# Patient Record
Sex: MALE | Race: BLACK OR AFRICAN AMERICAN | Employment: OTHER | URBAN - METROPOLITAN AREA
[De-identification: names, ages, dates, MRNs, and addresses within clinical notes are randomized per-mention and may not be internally consistent; named-entity substitution may affect disease eponyms.]

---

## 2022-01-01 ENCOUNTER — APPOINTMENT (OUTPATIENT)
Dept: GENERAL RADIOLOGY | Age: 87
DRG: 682 | End: 2022-01-01
Payer: MEDICARE

## 2022-01-01 ENCOUNTER — HOSPITAL ENCOUNTER (EMERGENCY)
Age: 87
End: 2022-10-27
Attending: EMERGENCY MEDICINE
Payer: MEDICARE

## 2022-01-01 ENCOUNTER — APPOINTMENT (OUTPATIENT)
Dept: CT IMAGING | Age: 87
DRG: 682 | End: 2022-01-01
Payer: MEDICARE

## 2022-01-01 ENCOUNTER — APPOINTMENT (OUTPATIENT)
Dept: GENERAL RADIOLOGY | Age: 87
End: 2022-01-01
Payer: MEDICARE

## 2022-01-01 ENCOUNTER — APPOINTMENT (OUTPATIENT)
Dept: VASCULAR LAB | Age: 87
DRG: 682 | End: 2022-01-01
Payer: MEDICARE

## 2022-01-01 ENCOUNTER — HOSPITAL ENCOUNTER (INPATIENT)
Age: 87
LOS: 14 days | Discharge: NURSING, REHAB OR LONG-TERM CARE | DRG: 682 | End: 2022-10-25
Attending: EMERGENCY MEDICINE | Admitting: INTERNAL MEDICINE
Payer: MEDICARE

## 2022-01-01 VITALS
OXYGEN SATURATION: 78 % | RESPIRATION RATE: 24 BRPM | BODY MASS INDEX: 32.26 KG/M2 | HEIGHT: 69 IN | SYSTOLIC BLOOD PRESSURE: 72 MMHG | DIASTOLIC BLOOD PRESSURE: 50 MMHG | WEIGHT: 217.8 LBS | HEART RATE: 88 BPM

## 2022-01-01 VITALS
WEIGHT: 202.82 LBS | HEIGHT: 69 IN | DIASTOLIC BLOOD PRESSURE: 44 MMHG | TEMPERATURE: 98.5 F | SYSTOLIC BLOOD PRESSURE: 92 MMHG | OXYGEN SATURATION: 99 % | RESPIRATION RATE: 18 BRPM | HEART RATE: 99 BPM | BODY MASS INDEX: 30.04 KG/M2

## 2022-01-01 DIAGNOSIS — I95.9 HYPOTENSION, UNSPECIFIED HYPOTENSION TYPE: ICD-10-CM

## 2022-01-01 DIAGNOSIS — I46.9 CARDIAC ARREST (HCC): Primary | ICD-10-CM

## 2022-01-01 DIAGNOSIS — K92.1 MELENA: ICD-10-CM

## 2022-01-01 DIAGNOSIS — N18.6 ESRD (END STAGE RENAL DISEASE) ON DIALYSIS (HCC): Primary | ICD-10-CM

## 2022-01-01 DIAGNOSIS — Z99.2 ESRD (END STAGE RENAL DISEASE) ON DIALYSIS (HCC): Primary | ICD-10-CM

## 2022-01-01 LAB
ABO/RH: NORMAL
ABO/RH: NORMAL
ALBUMIN SERPL-MCNC: 2.3 G/DL (ref 3.4–5)
ALBUMIN SERPL-MCNC: 2.5 G/DL (ref 3.4–5)
ALBUMIN SERPL-MCNC: 2.6 G/DL (ref 3.4–5)
ALBUMIN SERPL-MCNC: 2.7 G/DL (ref 3.4–5)
ALBUMIN SERPL-MCNC: 2.8 G/DL (ref 3.4–5)
ALBUMIN SERPL-MCNC: 3 G/DL (ref 3.4–5)
ALBUMIN SERPL-MCNC: 3.1 G/DL (ref 3.4–5)
ALBUMIN SERPL-MCNC: 3.1 G/DL (ref 3.4–5)
ALBUMIN SERPL-MCNC: 3.2 G/DL (ref 3.4–5)
ALBUMIN SERPL-MCNC: 3.3 G/DL (ref 3.4–5)
ALBUMIN SERPL-MCNC: 3.4 G/DL (ref 3.4–5)
ALBUMIN SERPL-MCNC: 3.4 G/DL (ref 3.4–5)
ALBUMIN SERPL-MCNC: 3.5 G/DL (ref 3.4–5)
ALP BLD-CCNC: 274 U/L (ref 40–129)
ALP BLD-CCNC: 77 U/L (ref 40–129)
ALT SERPL-CCNC: 170 U/L (ref 10–40)
ALT SERPL-CCNC: <5 U/L (ref 10–40)
ANION GAP SERPL CALCULATED.3IONS-SCNC: 10 MMOL/L (ref 3–16)
ANION GAP SERPL CALCULATED.3IONS-SCNC: 10 MMOL/L (ref 3–16)
ANION GAP SERPL CALCULATED.3IONS-SCNC: 11 MMOL/L (ref 3–16)
ANION GAP SERPL CALCULATED.3IONS-SCNC: 12 MMOL/L (ref 3–16)
ANION GAP SERPL CALCULATED.3IONS-SCNC: 13 MMOL/L (ref 3–16)
ANION GAP SERPL CALCULATED.3IONS-SCNC: 14 MMOL/L (ref 3–16)
ANION GAP SERPL CALCULATED.3IONS-SCNC: 14 MMOL/L (ref 3–16)
ANION GAP SERPL CALCULATED.3IONS-SCNC: 15 MMOL/L (ref 3–16)
ANION GAP SERPL CALCULATED.3IONS-SCNC: 17 MMOL/L (ref 3–16)
ANION GAP SERPL CALCULATED.3IONS-SCNC: 8 MMOL/L (ref 3–16)
ANION GAP SERPL CALCULATED.3IONS-SCNC: 8 MMOL/L (ref 3–16)
ANION GAP SERPL CALCULATED.3IONS-SCNC: 9 MMOL/L (ref 3–16)
ANION GAP SERPL CALCULATED.3IONS-SCNC: 9 MMOL/L (ref 3–16)
ANISOCYTOSIS: ABNORMAL
ANISOCYTOSIS: ABNORMAL
ANTIBODY SCREEN: NORMAL
ANTIBODY SCREEN: NORMAL
APTT: 50.5 SEC (ref 23–34.3)
AST SERPL-CCNC: 24 U/L (ref 15–37)
AST SERPL-CCNC: 485 U/L (ref 15–37)
BASE EXCESS VENOUS: -4.9 MMOL/L (ref -2–3)
BASOPHILS ABSOLUTE: 0 K/UL (ref 0–0.2)
BASOPHILS ABSOLUTE: 0.1 K/UL (ref 0–0.2)
BASOPHILS RELATIVE PERCENT: 0 %
BASOPHILS RELATIVE PERCENT: 0.1 %
BASOPHILS RELATIVE PERCENT: 0.3 %
BASOPHILS RELATIVE PERCENT: 0.4 %
BASOPHILS RELATIVE PERCENT: 0.5 %
BASOPHILS RELATIVE PERCENT: 0.7 %
BETA-HYDROXYBUTYRATE: 2.28 MMOL/L (ref 0–0.27)
BILIRUB SERPL-MCNC: 0.8 MG/DL (ref 0–1)
BILIRUB SERPL-MCNC: 5.2 MG/DL (ref 0–1)
BILIRUBIN DIRECT: 0.4 MG/DL (ref 0–0.3)
BILIRUBIN DIRECT: 4 MG/DL (ref 0–0.3)
BILIRUBIN, INDIRECT: 0.4 MG/DL (ref 0–1)
BILIRUBIN, INDIRECT: 1.2 MG/DL (ref 0–1)
BLOOD BANK DISPENSE STATUS: NORMAL
BLOOD BANK DISPENSE STATUS: NORMAL
BLOOD BANK PRODUCT CODE: NORMAL
BLOOD BANK PRODUCT CODE: NORMAL
BPU ID: NORMAL
BPU ID: NORMAL
BUN BLDV-MCNC: 13 MG/DL (ref 7–20)
BUN BLDV-MCNC: 18 MG/DL (ref 7–20)
BUN BLDV-MCNC: 20 MG/DL (ref 7–20)
BUN BLDV-MCNC: 20 MG/DL (ref 7–20)
BUN BLDV-MCNC: 23 MG/DL (ref 7–20)
BUN BLDV-MCNC: 26 MG/DL (ref 7–20)
BUN BLDV-MCNC: 26 MG/DL (ref 7–20)
BUN BLDV-MCNC: 27 MG/DL (ref 7–20)
BUN BLDV-MCNC: 28 MG/DL (ref 7–20)
BUN BLDV-MCNC: 29 MG/DL (ref 7–20)
BUN BLDV-MCNC: 30 MG/DL (ref 7–20)
BUN BLDV-MCNC: 31 MG/DL (ref 7–20)
BUN BLDV-MCNC: 32 MG/DL (ref 7–20)
BUN BLDV-MCNC: 34 MG/DL (ref 7–20)
BUN BLDV-MCNC: 42 MG/DL (ref 7–20)
BUN BLDV-MCNC: 47 MG/DL (ref 7–20)
BUN BLDV-MCNC: 54 MG/DL (ref 7–20)
BUN BLDV-MCNC: 60 MG/DL (ref 7–20)
CALCIUM SERPL-MCNC: 7.8 MG/DL (ref 8.3–10.6)
CALCIUM SERPL-MCNC: 8.2 MG/DL (ref 8.3–10.6)
CALCIUM SERPL-MCNC: 8.3 MG/DL (ref 8.3–10.6)
CALCIUM SERPL-MCNC: 8.3 MG/DL (ref 8.3–10.6)
CALCIUM SERPL-MCNC: 8.4 MG/DL (ref 8.3–10.6)
CALCIUM SERPL-MCNC: 8.5 MG/DL (ref 8.3–10.6)
CALCIUM SERPL-MCNC: 8.6 MG/DL (ref 8.3–10.6)
CALCIUM SERPL-MCNC: 8.6 MG/DL (ref 8.3–10.6)
CALCIUM SERPL-MCNC: 8.8 MG/DL (ref 8.3–10.6)
CALCIUM SERPL-MCNC: 8.8 MG/DL (ref 8.3–10.6)
CALCIUM SERPL-MCNC: 9 MG/DL (ref 8.3–10.6)
CALCIUM SERPL-MCNC: 9.1 MG/DL (ref 8.3–10.6)
CARBOXYHEMOGLOBIN: 1.3 % (ref 0–1.5)
CHLORIDE BLD-SCNC: 100 MMOL/L (ref 99–110)
CHLORIDE BLD-SCNC: 101 MMOL/L (ref 99–110)
CHLORIDE BLD-SCNC: 102 MMOL/L (ref 99–110)
CHLORIDE BLD-SCNC: 104 MMOL/L (ref 99–110)
CHLORIDE BLD-SCNC: 105 MMOL/L (ref 99–110)
CHLORIDE BLD-SCNC: 98 MMOL/L (ref 99–110)
CHLORIDE BLD-SCNC: 98 MMOL/L (ref 99–110)
CHLORIDE BLD-SCNC: 99 MMOL/L (ref 99–110)
CHLORIDE BLD-SCNC: 99 MMOL/L (ref 99–110)
CO2: 20 MMOL/L (ref 21–32)
CO2: 21 MMOL/L (ref 21–32)
CO2: 22 MMOL/L (ref 21–32)
CO2: 23 MMOL/L (ref 21–32)
CO2: 23 MMOL/L (ref 21–32)
CO2: 24 MMOL/L (ref 21–32)
CO2: 25 MMOL/L (ref 21–32)
CO2: 26 MMOL/L (ref 21–32)
CO2: 27 MMOL/L (ref 21–32)
CO2: 28 MMOL/L (ref 21–32)
CO2: 28 MMOL/L (ref 21–32)
CORTISOL TOTAL: 22.4 UG/DL
CREAT SERPL-MCNC: 1.3 MG/DL (ref 0.8–1.3)
CREAT SERPL-MCNC: 1.4 MG/DL (ref 0.8–1.3)
CREAT SERPL-MCNC: 1.5 MG/DL (ref 0.8–1.3)
CREAT SERPL-MCNC: 1.5 MG/DL (ref 0.8–1.3)
CREAT SERPL-MCNC: 1.7 MG/DL (ref 0.8–1.3)
CREAT SERPL-MCNC: 1.8 MG/DL (ref 0.8–1.3)
CREAT SERPL-MCNC: 2 MG/DL (ref 0.8–1.3)
CREAT SERPL-MCNC: 2 MG/DL (ref 0.8–1.3)
CREAT SERPL-MCNC: 2.1 MG/DL (ref 0.8–1.3)
CREAT SERPL-MCNC: 2.2 MG/DL (ref 0.8–1.3)
CREAT SERPL-MCNC: 2.2 MG/DL (ref 0.8–1.3)
CREAT SERPL-MCNC: 2.5 MG/DL (ref 0.8–1.3)
CREAT SERPL-MCNC: 2.6 MG/DL (ref 0.8–1.3)
CREAT SERPL-MCNC: 2.6 MG/DL (ref 0.8–1.3)
DESCRIPTION BLOOD BANK: NORMAL
DESCRIPTION BLOOD BANK: NORMAL
EKG ATRIAL RATE: 113 BPM
EKG ATRIAL RATE: 357 BPM
EKG DIAGNOSIS: NORMAL
EKG DIAGNOSIS: NORMAL
EKG Q-T INTERVAL: 334 MS
EKG Q-T INTERVAL: 376 MS
EKG QRS DURATION: 100 MS
EKG QRS DURATION: 106 MS
EKG QTC CALCULATION (BAZETT): 458 MS
EKG QTC CALCULATION (BAZETT): 496 MS
EKG R AXIS: -15 DEGREES
EKG R AXIS: 36 DEGREES
EKG T AXIS: 163 DEGREES
EKG T AXIS: 221 DEGREES
EKG VENTRICULAR RATE: 105 BPM
EKG VENTRICULAR RATE: 113 BPM
EOSINOPHILS ABSOLUTE: 0 K/UL (ref 0–0.6)
EOSINOPHILS ABSOLUTE: 0.1 K/UL (ref 0–0.6)
EOSINOPHILS RELATIVE PERCENT: 0 %
EOSINOPHILS RELATIVE PERCENT: 0.1 %
EOSINOPHILS RELATIVE PERCENT: 0.1 %
EOSINOPHILS RELATIVE PERCENT: 0.2 %
EOSINOPHILS RELATIVE PERCENT: 0.3 %
EOSINOPHILS RELATIVE PERCENT: 0.5 %
EOSINOPHILS RELATIVE PERCENT: 0.5 %
EOSINOPHILS RELATIVE PERCENT: 0.6 %
EOSINOPHILS RELATIVE PERCENT: 0.8 %
FERRITIN: 564.1 NG/ML (ref 30–400)
FOLATE: 4.16 NG/ML (ref 4.78–24.2)
GFR AFRICAN AMERICAN: 28
GFR AFRICAN AMERICAN: 39
GFR AFRICAN AMERICAN: 44
GFR AFRICAN AMERICAN: 47
GFR AFRICAN AMERICAN: 58
GFR AFRICAN AMERICAN: >60
GFR NON-AFRICAN AMERICAN: 24
GFR NON-AFRICAN AMERICAN: 32
GFR NON-AFRICAN AMERICAN: 36
GFR NON-AFRICAN AMERICAN: 38
GFR NON-AFRICAN AMERICAN: 48
GFR NON-AFRICAN AMERICAN: 52
GFR SERPL CREATININE-BSD FRML MDRD: 23 ML/MIN/{1.73_M2}
GFR SERPL CREATININE-BSD FRML MDRD: 24 ML/MIN/{1.73_M2}
GFR SERPL CREATININE-BSD FRML MDRD: 29 ML/MIN/{1.73_M2}
GFR SERPL CREATININE-BSD FRML MDRD: 29 ML/MIN/{1.73_M2}
GFR SERPL CREATININE-BSD FRML MDRD: 30 ML/MIN/{1.73_M2}
GFR SERPL CREATININE-BSD FRML MDRD: 32 ML/MIN/{1.73_M2}
GFR SERPL CREATININE-BSD FRML MDRD: 39 ML/MIN/{1.73_M2}
GFR SERPL CREATININE-BSD FRML MDRD: 39 ML/MIN/{1.73_M2}
GFR SERPL CREATININE-BSD FRML MDRD: 45 ML/MIN/{1.73_M2}
GFR SERPL CREATININE-BSD FRML MDRD: 45 ML/MIN/{1.73_M2}
GLUCOSE BLD-MCNC: 105 MG/DL (ref 70–99)
GLUCOSE BLD-MCNC: 109 MG/DL (ref 70–99)
GLUCOSE BLD-MCNC: 109 MG/DL (ref 70–99)
GLUCOSE BLD-MCNC: 111 MG/DL (ref 70–99)
GLUCOSE BLD-MCNC: 64 MG/DL (ref 70–99)
GLUCOSE BLD-MCNC: 67 MG/DL (ref 70–99)
GLUCOSE BLD-MCNC: 73 MG/DL (ref 70–99)
GLUCOSE BLD-MCNC: 77 MG/DL (ref 70–99)
GLUCOSE BLD-MCNC: 78 MG/DL (ref 70–99)
GLUCOSE BLD-MCNC: 78 MG/DL (ref 70–99)
GLUCOSE BLD-MCNC: 84 MG/DL (ref 70–99)
GLUCOSE BLD-MCNC: 87 MG/DL (ref 70–99)
GLUCOSE BLD-MCNC: 89 MG/DL (ref 70–99)
GLUCOSE BLD-MCNC: 90 MG/DL (ref 70–99)
GLUCOSE BLD-MCNC: 91 MG/DL (ref 70–99)
GLUCOSE BLD-MCNC: 92 MG/DL (ref 70–99)
GLUCOSE BLD-MCNC: 94 MG/DL (ref 70–99)
GLUCOSE BLD-MCNC: 98 MG/DL (ref 70–99)
HAPTOGLOBIN: 162 MG/DL (ref 30–200)
HCO3 VENOUS: 23.1 MMOL/L (ref 24–28)
HCT VFR BLD CALC: 16.5 % (ref 40.5–52.5)
HCT VFR BLD CALC: 17 % (ref 40.5–52.5)
HCT VFR BLD CALC: 17.2 % (ref 40.5–52.5)
HCT VFR BLD CALC: 17.4 % (ref 40.5–52.5)
HCT VFR BLD CALC: 17.5 % (ref 40.5–52.5)
HCT VFR BLD CALC: 17.6 % (ref 40.5–52.5)
HCT VFR BLD CALC: 18.1 % (ref 40.5–52.5)
HCT VFR BLD CALC: 20.1 % (ref 40.5–52.5)
HCT VFR BLD CALC: 21.1 % (ref 40.5–52.5)
HCT VFR BLD CALC: 21.4 % (ref 40.5–52.5)
HCT VFR BLD CALC: 21.5 % (ref 40.5–52.5)
HCT VFR BLD CALC: 21.6 % (ref 40.5–52.5)
HCT VFR BLD CALC: 21.7 % (ref 40.5–52.5)
HCT VFR BLD CALC: 21.7 % (ref 40.5–52.5)
HCT VFR BLD CALC: 22.3 % (ref 40.5–52.5)
HCT VFR BLD CALC: 22.7 % (ref 40.5–52.5)
HCT VFR BLD CALC: 22.9 % (ref 40.5–52.5)
HCT VFR BLD CALC: 23 % (ref 40.5–52.5)
HCT VFR BLD CALC: 23.1 % (ref 40.5–52.5)
HCT VFR BLD CALC: 23.2 % (ref 40.5–52.5)
HCT VFR BLD CALC: 23.5 % (ref 40.5–52.5)
HCT VFR BLD CALC: 24.2 % (ref 40.5–52.5)
HCT VFR BLD CALC: 24.4 % (ref 40.5–52.5)
HCT VFR BLD CALC: 25.6 % (ref 40.5–52.5)
HCT VFR BLD CALC: 28.4 % (ref 40.5–52.5)
HCT VFR BLD CALC: 33.3 % (ref 40.5–52.5)
HEMOGLOBIN, VEN, REDUCED: 58.3 %
HEMOGLOBIN: 10.2 G/DL (ref 13.5–17.5)
HEMOGLOBIN: 5.4 G/DL (ref 13.5–17.5)
HEMOGLOBIN: 5.6 G/DL (ref 13.5–17.5)
HEMOGLOBIN: 5.8 G/DL (ref 13.5–17.5)
HEMOGLOBIN: 5.9 G/DL (ref 13.5–17.5)
HEMOGLOBIN: 6.5 G/DL (ref 13.5–17.5)
HEMOGLOBIN: 6.9 G/DL (ref 13.5–17.5)
HEMOGLOBIN: 7 G/DL (ref 13.5–17.5)
HEMOGLOBIN: 7.1 G/DL (ref 13.5–17.5)
HEMOGLOBIN: 7.2 G/DL (ref 13.5–17.5)
HEMOGLOBIN: 7.3 G/DL (ref 13.5–17.5)
HEMOGLOBIN: 7.4 G/DL (ref 13.5–17.5)
HEMOGLOBIN: 7.4 G/DL (ref 13.5–17.5)
HEMOGLOBIN: 7.5 G/DL (ref 13.5–17.5)
HEMOGLOBIN: 7.6 G/DL (ref 13.5–17.5)
HEMOGLOBIN: 7.6 G/DL (ref 13.5–17.5)
HEMOGLOBIN: 7.8 G/DL (ref 13.5–17.5)
HEMOGLOBIN: 7.8 G/DL (ref 13.5–17.5)
HEMOGLOBIN: 9.2 G/DL (ref 13.5–17.5)
HYPOCHROMIA: ABNORMAL
IMMATURE RETIC FRACT: 0.57 (ref 0.21–0.37)
INR BLD: 2.5 (ref 0.87–1.14)
IRON SATURATION: 34 % (ref 20–50)
IRON: 25 UG/DL (ref 59–158)
LACTATE DEHYDROGENASE: 278 U/L (ref 100–190)
LACTIC ACID: 1.1 MMOL/L (ref 0.4–2)
LACTIC ACID: 6.9 MMOL/L (ref 0.4–2)
LYMPHOCYTES ABSOLUTE: 1 K/UL (ref 1–5.1)
LYMPHOCYTES ABSOLUTE: 1.1 K/UL (ref 1–5.1)
LYMPHOCYTES ABSOLUTE: 1.2 K/UL (ref 1–5.1)
LYMPHOCYTES ABSOLUTE: 1.3 K/UL (ref 1–5.1)
LYMPHOCYTES ABSOLUTE: 1.4 K/UL (ref 1–5.1)
LYMPHOCYTES ABSOLUTE: 1.5 K/UL (ref 1–5.1)
LYMPHOCYTES ABSOLUTE: 3.4 K/UL (ref 1–5.1)
LYMPHOCYTES RELATIVE PERCENT: 10.1 %
LYMPHOCYTES RELATIVE PERCENT: 11.6 %
LYMPHOCYTES RELATIVE PERCENT: 12 %
LYMPHOCYTES RELATIVE PERCENT: 12 %
LYMPHOCYTES RELATIVE PERCENT: 13.9 %
LYMPHOCYTES RELATIVE PERCENT: 14 %
LYMPHOCYTES RELATIVE PERCENT: 14.5 %
LYMPHOCYTES RELATIVE PERCENT: 14.8 %
LYMPHOCYTES RELATIVE PERCENT: 15 %
LYMPHOCYTES RELATIVE PERCENT: 15 %
LYMPHOCYTES RELATIVE PERCENT: 4.2 %
LYMPHOCYTES RELATIVE PERCENT: 5.1 %
LYMPHOCYTES RELATIVE PERCENT: 5.7 %
LYMPHOCYTES RELATIVE PERCENT: 7.1 %
MAGNESIUM: 1.7 MG/DL (ref 1.8–2.4)
MAGNESIUM: 1.7 MG/DL (ref 1.8–2.4)
MAGNESIUM: 2 MG/DL (ref 1.8–2.4)
MCH RBC QN AUTO: 27.1 PG (ref 26–34)
MCH RBC QN AUTO: 27.1 PG (ref 26–34)
MCH RBC QN AUTO: 27.5 PG (ref 26–34)
MCH RBC QN AUTO: 27.8 PG (ref 26–34)
MCH RBC QN AUTO: 27.8 PG (ref 26–34)
MCH RBC QN AUTO: 27.9 PG (ref 26–34)
MCH RBC QN AUTO: 27.9 PG (ref 26–34)
MCH RBC QN AUTO: 28.1 PG (ref 26–34)
MCH RBC QN AUTO: 28.3 PG (ref 26–34)
MCH RBC QN AUTO: 28.4 PG (ref 26–34)
MCH RBC QN AUTO: 28.5 PG (ref 26–34)
MCH RBC QN AUTO: 28.6 PG (ref 26–34)
MCH RBC QN AUTO: 28.8 PG (ref 26–34)
MCHC RBC AUTO-ENTMCNC: 30.4 G/DL (ref 31–36)
MCHC RBC AUTO-ENTMCNC: 30.4 G/DL (ref 31–36)
MCHC RBC AUTO-ENTMCNC: 30.8 G/DL (ref 31–36)
MCHC RBC AUTO-ENTMCNC: 30.9 G/DL (ref 31–36)
MCHC RBC AUTO-ENTMCNC: 31.3 G/DL (ref 31–36)
MCHC RBC AUTO-ENTMCNC: 31.9 G/DL (ref 31–36)
MCHC RBC AUTO-ENTMCNC: 31.9 G/DL (ref 31–36)
MCHC RBC AUTO-ENTMCNC: 32.3 G/DL (ref 31–36)
MCHC RBC AUTO-ENTMCNC: 32.3 G/DL (ref 31–36)
MCHC RBC AUTO-ENTMCNC: 32.4 G/DL (ref 31–36)
MCHC RBC AUTO-ENTMCNC: 32.6 G/DL (ref 31–36)
MCHC RBC AUTO-ENTMCNC: 33.1 G/DL (ref 31–36)
MCHC RBC AUTO-ENTMCNC: 33.3 G/DL (ref 31–36)
MCHC RBC AUTO-ENTMCNC: 33.4 G/DL (ref 31–36)
MCHC RBC AUTO-ENTMCNC: 33.8 G/DL (ref 31–36)
MCV RBC AUTO: 84.2 FL (ref 80–100)
MCV RBC AUTO: 84.4 FL (ref 80–100)
MCV RBC AUTO: 85 FL (ref 80–100)
MCV RBC AUTO: 85.1 FL (ref 80–100)
MCV RBC AUTO: 85.6 FL (ref 80–100)
MCV RBC AUTO: 85.8 FL (ref 80–100)
MCV RBC AUTO: 86.9 FL (ref 80–100)
MCV RBC AUTO: 88.1 FL (ref 80–100)
MCV RBC AUTO: 88.1 FL (ref 80–100)
MCV RBC AUTO: 88.2 FL (ref 80–100)
MCV RBC AUTO: 89 FL (ref 80–100)
MCV RBC AUTO: 89 FL (ref 80–100)
MCV RBC AUTO: 89.8 FL (ref 80–100)
MCV RBC AUTO: 90 FL (ref 80–100)
MCV RBC AUTO: 91.7 FL (ref 80–100)
METHEMOGLOBIN VENOUS: 0.7 % (ref 0–1.5)
MONOCYTES ABSOLUTE: 0.7 K/UL (ref 0–1.3)
MONOCYTES ABSOLUTE: 0.8 K/UL (ref 0–1.3)
MONOCYTES ABSOLUTE: 1.1 K/UL (ref 0–1.3)
MONOCYTES ABSOLUTE: 1.2 K/UL (ref 0–1.3)
MONOCYTES ABSOLUTE: 1.2 K/UL (ref 0–1.3)
MONOCYTES ABSOLUTE: 1.3 K/UL (ref 0–1.3)
MONOCYTES ABSOLUTE: 1.3 K/UL (ref 0–1.3)
MONOCYTES ABSOLUTE: 1.4 K/UL (ref 0–1.3)
MONOCYTES ABSOLUTE: 1.9 K/UL (ref 0–1.3)
MONOCYTES RELATIVE PERCENT: 10.1 %
MONOCYTES RELATIVE PERCENT: 10.8 %
MONOCYTES RELATIVE PERCENT: 13 %
MONOCYTES RELATIVE PERCENT: 4.7 %
MONOCYTES RELATIVE PERCENT: 5.5 %
MONOCYTES RELATIVE PERCENT: 6 %
MONOCYTES RELATIVE PERCENT: 6.6 %
MONOCYTES RELATIVE PERCENT: 7.3 %
MONOCYTES RELATIVE PERCENT: 7.4 %
MONOCYTES RELATIVE PERCENT: 8.3 %
MONOCYTES RELATIVE PERCENT: 8.4 %
MONOCYTES RELATIVE PERCENT: 8.4 %
MONOCYTES RELATIVE PERCENT: 8.8 %
MONOCYTES RELATIVE PERCENT: 8.9 %
NEUTROPHILS ABSOLUTE: 17.3 K/UL (ref 1.7–7.7)
NEUTROPHILS ABSOLUTE: 18.1 K/UL (ref 1.7–7.7)
NEUTROPHILS ABSOLUTE: 18.2 K/UL (ref 1.7–7.7)
NEUTROPHILS ABSOLUTE: 20.7 K/UL (ref 1.7–7.7)
NEUTROPHILS ABSOLUTE: 21.1 K/UL (ref 1.7–7.7)
NEUTROPHILS ABSOLUTE: 6.1 K/UL (ref 1.7–7.7)
NEUTROPHILS ABSOLUTE: 6.8 K/UL (ref 1.7–7.7)
NEUTROPHILS ABSOLUTE: 6.9 K/UL (ref 1.7–7.7)
NEUTROPHILS ABSOLUTE: 7.1 K/UL (ref 1.7–7.7)
NEUTROPHILS ABSOLUTE: 7.2 K/UL (ref 1.7–7.7)
NEUTROPHILS ABSOLUTE: 7.3 K/UL (ref 1.7–7.7)
NEUTROPHILS ABSOLUTE: 7.4 K/UL (ref 1.7–7.7)
NEUTROPHILS ABSOLUTE: 8.5 K/UL (ref 1.7–7.7)
NEUTROPHILS ABSOLUTE: 8.5 K/UL (ref 1.7–7.7)
NEUTROPHILS RELATIVE PERCENT: 74.5 %
NEUTROPHILS RELATIVE PERCENT: 75 %
NEUTROPHILS RELATIVE PERCENT: 75.4 %
NEUTROPHILS RELATIVE PERCENT: 76.1 %
NEUTROPHILS RELATIVE PERCENT: 76.2 %
NEUTROPHILS RELATIVE PERCENT: 76.4 %
NEUTROPHILS RELATIVE PERCENT: 76.6 %
NEUTROPHILS RELATIVE PERCENT: 77.3 %
NEUTROPHILS RELATIVE PERCENT: 79.7 %
NEUTROPHILS RELATIVE PERCENT: 81.8 %
NEUTROPHILS RELATIVE PERCENT: 86.5 %
NEUTROPHILS RELATIVE PERCENT: 87.2 %
NEUTROPHILS RELATIVE PERCENT: 89.3 %
NEUTROPHILS RELATIVE PERCENT: 90.1 %
NUCLEATED RED BLOOD CELLS: 3 /100 WBC
O2 SAT, VEN: 41 %
OCCULT BLOOD DIAGNOSTIC: ABNORMAL
PCO2, VEN: 58.5 MMHG (ref 41–51)
PDW BLD-RTO: 20 % (ref 12.4–15.4)
PDW BLD-RTO: 20.1 % (ref 12.4–15.4)
PDW BLD-RTO: 20.3 % (ref 12.4–15.4)
PDW BLD-RTO: 20.4 % (ref 12.4–15.4)
PDW BLD-RTO: 21 % (ref 12.4–15.4)
PDW BLD-RTO: 21.1 % (ref 12.4–15.4)
PDW BLD-RTO: 21.3 % (ref 12.4–15.4)
PDW BLD-RTO: 21.4 % (ref 12.4–15.4)
PDW BLD-RTO: 21.9 % (ref 12.4–15.4)
PDW BLD-RTO: 21.9 % (ref 12.4–15.4)
PDW BLD-RTO: 22.1 % (ref 12.4–15.4)
PDW BLD-RTO: 22.5 % (ref 12.4–15.4)
PDW BLD-RTO: 22.7 % (ref 12.4–15.4)
PDW BLD-RTO: 22.7 % (ref 12.4–15.4)
PDW BLD-RTO: 23.7 % (ref 12.4–15.4)
PH VENOUS: 7.2 (ref 7.35–7.45)
PHOSPHORUS: 1.1 MG/DL (ref 2.5–4.9)
PHOSPHORUS: 1.1 MG/DL (ref 2.5–4.9)
PHOSPHORUS: 1.4 MG/DL (ref 2.5–4.9)
PHOSPHORUS: 1.5 MG/DL (ref 2.5–4.9)
PHOSPHORUS: 1.7 MG/DL (ref 2.5–4.9)
PHOSPHORUS: 1.7 MG/DL (ref 2.5–4.9)
PHOSPHORUS: 1.8 MG/DL (ref 2.5–4.9)
PHOSPHORUS: 2 MG/DL (ref 2.5–4.9)
PHOSPHORUS: 2.4 MG/DL (ref 2.5–4.9)
PHOSPHORUS: 2.5 MG/DL (ref 2.5–4.9)
PHOSPHORUS: 2.5 MG/DL (ref 2.5–4.9)
PHOSPHORUS: 2.6 MG/DL (ref 2.5–4.9)
PHOSPHORUS: 2.8 MG/DL (ref 2.5–4.9)
PHOSPHORUS: 2.8 MG/DL (ref 2.5–4.9)
PHOSPHORUS: 2.9 MG/DL (ref 2.5–4.9)
PHOSPHORUS: 2.9 MG/DL (ref 2.5–4.9)
PLATELET # BLD: 147 K/UL (ref 135–450)
PLATELET # BLD: 155 K/UL (ref 135–450)
PLATELET # BLD: 157 K/UL (ref 135–450)
PLATELET # BLD: 173 K/UL (ref 135–450)
PLATELET # BLD: 173 K/UL (ref 135–450)
PLATELET # BLD: 176 K/UL (ref 135–450)
PLATELET # BLD: 194 K/UL (ref 135–450)
PLATELET # BLD: 195 K/UL (ref 135–450)
PLATELET # BLD: 196 K/UL (ref 135–450)
PLATELET # BLD: 198 K/UL (ref 135–450)
PLATELET # BLD: 198 K/UL (ref 135–450)
PLATELET # BLD: 208 K/UL (ref 135–450)
PLATELET # BLD: 224 K/UL (ref 135–450)
PLATELET # BLD: 238 K/UL (ref 135–450)
PLATELET # BLD: 57 K/UL (ref 135–450)
PLATELET # BLD: 92 K/UL (ref 135–450)
PLATELET SLIDE REVIEW: ABNORMAL
PLATELET SLIDE REVIEW: ADEQUATE
PMV BLD AUTO: 7.8 FL (ref 5–10.5)
PMV BLD AUTO: 7.9 FL (ref 5–10.5)
PMV BLD AUTO: 8 FL (ref 5–10.5)
PMV BLD AUTO: 8 FL (ref 5–10.5)
PMV BLD AUTO: 8.1 FL (ref 5–10.5)
PMV BLD AUTO: 8.2 FL (ref 5–10.5)
PMV BLD AUTO: 8.2 FL (ref 5–10.5)
PMV BLD AUTO: 8.3 FL (ref 5–10.5)
PMV BLD AUTO: 8.3 FL (ref 5–10.5)
PMV BLD AUTO: 8.4 FL (ref 5–10.5)
PMV BLD AUTO: 8.6 FL (ref 5–10.5)
PO2, VEN: <30 MMHG (ref 25–40)
POIKILOCYTES: ABNORMAL
POLYCHROMASIA: ABNORMAL
POTASSIUM REFLEX MAGNESIUM: 3.7 MMOL/L (ref 3.5–5.1)
POTASSIUM REFLEX MAGNESIUM: 4.3 MMOL/L (ref 3.5–5.1)
POTASSIUM REFLEX MAGNESIUM: 4.8 MMOL/L (ref 3.5–5.1)
POTASSIUM SERPL-SCNC: 3.3 MMOL/L (ref 3.5–5.1)
POTASSIUM SERPL-SCNC: 3.5 MMOL/L (ref 3.5–5.1)
POTASSIUM SERPL-SCNC: 3.7 MMOL/L (ref 3.5–5.1)
POTASSIUM SERPL-SCNC: 3.8 MMOL/L (ref 3.5–5.1)
POTASSIUM SERPL-SCNC: 3.9 MMOL/L (ref 3.5–5.1)
POTASSIUM SERPL-SCNC: 4 MMOL/L (ref 3.5–5.1)
POTASSIUM SERPL-SCNC: 4 MMOL/L (ref 3.5–5.1)
POTASSIUM SERPL-SCNC: 4.1 MMOL/L (ref 3.5–5.1)
POTASSIUM SERPL-SCNC: 4.1 MMOL/L (ref 3.5–5.1)
POTASSIUM SERPL-SCNC: 4.8 MMOL/L (ref 3.5–5.1)
POTASSIUM SERPL-SCNC: 5.3 MMOL/L (ref 3.5–5.1)
PROTHROMBIN TIME: 27.1 SEC (ref 11.7–14.5)
RBC # BLD: 1.9 M/UL (ref 4.2–5.9)
RBC # BLD: 2.01 M/UL (ref 4.2–5.9)
RBC # BLD: 2.36 M/UL (ref 4.2–5.9)
RBC # BLD: 2.52 M/UL (ref 4.2–5.9)
RBC # BLD: 2.53 M/UL (ref 4.2–5.9)
RBC # BLD: 2.55 M/UL (ref 4.2–5.9)
RBC # BLD: 2.58 M/UL (ref 4.2–5.9)
RBC # BLD: 2.59 M/UL (ref 4.2–5.9)
RBC # BLD: 2.6 M/UL (ref 4.2–5.9)
RBC # BLD: 2.7 M/UL (ref 4.2–5.9)
RBC # BLD: 2.71 M/UL (ref 4.2–5.9)
RBC # BLD: 2.71 M/UL (ref 4.2–5.9)
RBC # BLD: 2.79 M/UL (ref 4.2–5.9)
RBC # BLD: 3.3 M/UL (ref 4.2–5.9)
RBC # BLD: 3.78 M/UL (ref 4.2–5.9)
REASON FOR REJECTION: NORMAL
REJECTED TEST: NORMAL
RETICULOCYTE ABSOLUTE COUNT: 0.04 M/UL
RETICULOCYTE COUNT PCT: 1.72 % (ref 0.5–2.18)
SLIDE REVIEW: ABNORMAL
SODIUM BLD-SCNC: 133 MMOL/L (ref 136–145)
SODIUM BLD-SCNC: 134 MMOL/L (ref 136–145)
SODIUM BLD-SCNC: 136 MMOL/L (ref 136–145)
SODIUM BLD-SCNC: 137 MMOL/L (ref 136–145)
SODIUM BLD-SCNC: 138 MMOL/L (ref 136–145)
SODIUM BLD-SCNC: 139 MMOL/L (ref 136–145)
SODIUM BLD-SCNC: 140 MMOL/L (ref 136–145)
SODIUM BLD-SCNC: 140 MMOL/L (ref 136–145)
TARGET CELLS: ABNORMAL
TCO2 CALC VENOUS: 25 MMOL/L
TOTAL IRON BINDING CAPACITY: 74 UG/DL (ref 260–445)
TOTAL PROTEIN: 5.5 G/DL (ref 6.4–8.2)
TOTAL PROTEIN: 5.7 G/DL (ref 6.4–8.2)
TROPONIN: 0.52 NG/ML
VITAMIN B-12: 670 PG/ML (ref 211–911)
WBC # BLD: 10.4 K/UL (ref 4–11)
WBC # BLD: 10.9 K/UL (ref 4–11)
WBC # BLD: 20.9 K/UL (ref 4–11)
WBC # BLD: 21 K/UL (ref 4–11)
WBC # BLD: 22.7 K/UL (ref 4–11)
WBC # BLD: 23.2 K/UL (ref 4–11)
WBC # BLD: 23.4 K/UL (ref 4–11)
WBC # BLD: 8.2 K/UL (ref 4–11)
WBC # BLD: 8.8 K/UL (ref 4–11)
WBC # BLD: 9.1 K/UL (ref 4–11)
WBC # BLD: 9.1 K/UL (ref 4–11)
WBC # BLD: 9.2 K/UL (ref 4–11)
WBC # BLD: 9.3 K/UL (ref 4–11)
WBC # BLD: 9.5 K/UL (ref 4–11)
WBC # BLD: 9.7 K/UL (ref 4–11)

## 2022-01-01 PROCEDURE — 2580000003 HC RX 258: Performed by: STUDENT IN AN ORGANIZED HEALTH CARE EDUCATION/TRAINING PROGRAM

## 2022-01-01 PROCEDURE — 82607 VITAMIN B-12: CPT

## 2022-01-01 PROCEDURE — 2580000003 HC RX 258: Performed by: INTERNAL MEDICINE

## 2022-01-01 PROCEDURE — 6370000000 HC RX 637 (ALT 250 FOR IP): Performed by: INTERNAL MEDICINE

## 2022-01-01 PROCEDURE — 6370000000 HC RX 637 (ALT 250 FOR IP): Performed by: NURSE PRACTITIONER

## 2022-01-01 PROCEDURE — 90935 HEMODIALYSIS ONE EVALUATION: CPT

## 2022-01-01 PROCEDURE — P9047 ALBUMIN (HUMAN), 25%, 50ML: HCPCS | Performed by: STUDENT IN AN ORGANIZED HEALTH CARE EDUCATION/TRAINING PROGRAM

## 2022-01-01 PROCEDURE — 85025 COMPLETE CBC W/AUTO DIFF WBC: CPT

## 2022-01-01 PROCEDURE — 2500000003 HC RX 250 WO HCPCS: Performed by: STUDENT IN AN ORGANIZED HEALTH CARE EDUCATION/TRAINING PROGRAM

## 2022-01-01 PROCEDURE — 99285 EMERGENCY DEPT VISIT HI MDM: CPT

## 2022-01-01 PROCEDURE — 6360000002 HC RX W HCPCS: Performed by: INTERNAL MEDICINE

## 2022-01-01 PROCEDURE — 93971 EXTREMITY STUDY: CPT

## 2022-01-01 PROCEDURE — 86900 BLOOD TYPING SEROLOGIC ABO: CPT

## 2022-01-01 PROCEDURE — 80069 RENAL FUNCTION PANEL: CPT

## 2022-01-01 PROCEDURE — 6370000000 HC RX 637 (ALT 250 FOR IP): Performed by: STUDENT IN AN ORGANIZED HEALTH CARE EDUCATION/TRAINING PROGRAM

## 2022-01-01 PROCEDURE — 83735 ASSAY OF MAGNESIUM: CPT

## 2022-01-01 PROCEDURE — 3609012400 HC EGD TRANSORAL BIOPSY SINGLE/MULTIPLE: Performed by: INTERNAL MEDICINE

## 2022-01-01 PROCEDURE — 96366 THER/PROPH/DIAG IV INF ADDON: CPT

## 2022-01-01 PROCEDURE — 6360000002 HC RX W HCPCS: Performed by: STUDENT IN AN ORGANIZED HEALTH CARE EDUCATION/TRAINING PROGRAM

## 2022-01-01 PROCEDURE — 97535 SELF CARE MNGMENT TRAINING: CPT

## 2022-01-01 PROCEDURE — 85018 HEMOGLOBIN: CPT

## 2022-01-01 PROCEDURE — 1200000000 HC SEMI PRIVATE

## 2022-01-01 PROCEDURE — 71045 X-RAY EXAM CHEST 1 VIEW: CPT

## 2022-01-01 PROCEDURE — 80076 HEPATIC FUNCTION PANEL: CPT

## 2022-01-01 PROCEDURE — 85045 AUTOMATED RETICULOCYTE COUNT: CPT

## 2022-01-01 PROCEDURE — 83605 ASSAY OF LACTIC ACID: CPT

## 2022-01-01 PROCEDURE — 97530 THERAPEUTIC ACTIVITIES: CPT

## 2022-01-01 PROCEDURE — 2500000003 HC RX 250 WO HCPCS: Performed by: EMERGENCY MEDICINE

## 2022-01-01 PROCEDURE — 83615 LACTATE (LD) (LDH) ENZYME: CPT

## 2022-01-01 PROCEDURE — 2060000000 HC ICU INTERMEDIATE R&B

## 2022-01-01 PROCEDURE — 85027 COMPLETE CBC AUTOMATED: CPT

## 2022-01-01 PROCEDURE — 36415 COLL VENOUS BLD VENIPUNCTURE: CPT

## 2022-01-01 PROCEDURE — 74176 CT ABD & PELVIS W/O CONTRAST: CPT

## 2022-01-01 PROCEDURE — 97167 OT EVAL HIGH COMPLEX 60 MIN: CPT

## 2022-01-01 PROCEDURE — P9047 ALBUMIN (HUMAN), 25%, 50ML: HCPCS | Performed by: INTERNAL MEDICINE

## 2022-01-01 PROCEDURE — 97110 THERAPEUTIC EXERCISES: CPT

## 2022-01-01 PROCEDURE — 7100000011 HC PHASE II RECOVERY - ADDTL 15 MIN: Performed by: INTERNAL MEDICINE

## 2022-01-01 PROCEDURE — C9113 INJ PANTOPRAZOLE SODIUM, VIA: HCPCS | Performed by: INTERNAL MEDICINE

## 2022-01-01 PROCEDURE — 85014 HEMATOCRIT: CPT

## 2022-01-01 PROCEDURE — 84100 ASSAY OF PHOSPHORUS: CPT

## 2022-01-01 PROCEDURE — 0DB68ZX EXCISION OF STOMACH, VIA NATURAL OR ARTIFICIAL OPENING ENDOSCOPIC, DIAGNOSTIC: ICD-10-PCS | Performed by: INTERNAL MEDICINE

## 2022-01-01 PROCEDURE — 83550 IRON BINDING TEST: CPT

## 2022-01-01 PROCEDURE — 6360000002 HC RX W HCPCS: Performed by: EMERGENCY MEDICINE

## 2022-01-01 PROCEDURE — 82010 KETONE BODYS QUAN: CPT

## 2022-01-01 PROCEDURE — 88305 TISSUE EXAM BY PATHOLOGIST: CPT

## 2022-01-01 PROCEDURE — 36430 TRANSFUSION BLD/BLD COMPNT: CPT

## 2022-01-01 PROCEDURE — 96365 THER/PROPH/DIAG IV INF INIT: CPT

## 2022-01-01 PROCEDURE — P9016 RBC LEUKOCYTES REDUCED: HCPCS

## 2022-01-01 PROCEDURE — 83540 ASSAY OF IRON: CPT

## 2022-01-01 PROCEDURE — 86901 BLOOD TYPING SEROLOGIC RH(D): CPT

## 2022-01-01 PROCEDURE — 85610 PROTHROMBIN TIME: CPT

## 2022-01-01 PROCEDURE — 5A1D70Z PERFORMANCE OF URINARY FILTRATION, INTERMITTENT, LESS THAN 6 HOURS PER DAY: ICD-10-PCS | Performed by: INTERNAL MEDICINE

## 2022-01-01 PROCEDURE — 3700000001 HC ADD 15 MINUTES (ANESTHESIA): Performed by: INTERNAL MEDICINE

## 2022-01-01 PROCEDURE — 85049 AUTOMATED PLATELET COUNT: CPT

## 2022-01-01 PROCEDURE — 80048 BASIC METABOLIC PNL TOTAL CA: CPT

## 2022-01-01 PROCEDURE — 82533 TOTAL CORTISOL: CPT

## 2022-01-01 PROCEDURE — 93005 ELECTROCARDIOGRAM TRACING: CPT | Performed by: STUDENT IN AN ORGANIZED HEALTH CARE EDUCATION/TRAINING PROGRAM

## 2022-01-01 PROCEDURE — 7100000010 HC PHASE II RECOVERY - FIRST 15 MIN: Performed by: INTERNAL MEDICINE

## 2022-01-01 PROCEDURE — 97163 PT EVAL HIGH COMPLEX 45 MIN: CPT

## 2022-01-01 PROCEDURE — 83010 ASSAY OF HAPTOGLOBIN QUANT: CPT

## 2022-01-01 PROCEDURE — 92950 HEART/LUNG RESUSCITATION CPR: CPT

## 2022-01-01 PROCEDURE — 86850 RBC ANTIBODY SCREEN: CPT

## 2022-01-01 PROCEDURE — 2500000003 HC RX 250 WO HCPCS: Performed by: INTERNAL MEDICINE

## 2022-01-01 PROCEDURE — 3700000000 HC ANESTHESIA ATTENDED CARE: Performed by: INTERNAL MEDICINE

## 2022-01-01 PROCEDURE — 2709999900 HC NON-CHARGEABLE SUPPLY: Performed by: INTERNAL MEDICINE

## 2022-01-01 PROCEDURE — 82746 ASSAY OF FOLIC ACID SERUM: CPT

## 2022-01-01 PROCEDURE — 96375 TX/PRO/DX INJ NEW DRUG ADDON: CPT

## 2022-01-01 PROCEDURE — 84484 ASSAY OF TROPONIN QUANT: CPT

## 2022-01-01 PROCEDURE — 85730 THROMBOPLASTIN TIME PARTIAL: CPT

## 2022-01-01 PROCEDURE — 31500 INSERT EMERGENCY AIRWAY: CPT

## 2022-01-01 PROCEDURE — 82803 BLOOD GASES ANY COMBINATION: CPT

## 2022-01-01 PROCEDURE — 82728 ASSAY OF FERRITIN: CPT

## 2022-01-01 PROCEDURE — 86923 COMPATIBILITY TEST ELECTRIC: CPT

## 2022-01-01 PROCEDURE — 82270 OCCULT BLOOD FECES: CPT

## 2022-01-01 PROCEDURE — 99291 CRITICAL CARE FIRST HOUR: CPT

## 2022-01-01 RX ORDER — TRAZODONE HYDROCHLORIDE 50 MG/1
50 TABLET ORAL NIGHTLY
Status: DISCONTINUED | OUTPATIENT
Start: 2022-01-01 | End: 2022-01-01 | Stop reason: HOSPADM

## 2022-01-01 RX ORDER — MIDODRINE HYDROCHLORIDE 5 MG/1
10 TABLET ORAL
Status: DISCONTINUED | OUTPATIENT
Start: 2022-01-01 | End: 2022-01-01

## 2022-01-01 RX ORDER — HEPARIN SODIUM (PORCINE) LOCK FLUSH IV SOLN 100 UNIT/ML 100 UNIT/ML
100 SOLUTION INTRAVENOUS PRN
Status: DISCONTINUED | OUTPATIENT
Start: 2022-01-01 | End: 2022-01-01 | Stop reason: HOSPADM

## 2022-01-01 RX ORDER — CALCIUM CHLORIDE 100 MG/ML
INJECTION INTRAVENOUS; INTRAVENTRICULAR DAILY PRN
Status: COMPLETED | OUTPATIENT
Start: 2022-01-01 | End: 2022-01-01

## 2022-01-01 RX ORDER — SODIUM CHLORIDE 9 MG/ML
INJECTION, SOLUTION INTRAVENOUS CONTINUOUS
Status: ACTIVE | OUTPATIENT
Start: 2022-01-01 | End: 2022-01-01

## 2022-01-01 RX ORDER — HEPARIN SODIUM 5000 [USP'U]/ML
5000 INJECTION, SOLUTION INTRAVENOUS; SUBCUTANEOUS EVERY 8 HOURS SCHEDULED
Status: CANCELLED | OUTPATIENT
Start: 2022-01-01

## 2022-01-01 RX ORDER — SODIUM CHLORIDE, SODIUM LACTATE, POTASSIUM CHLORIDE, AND CALCIUM CHLORIDE .6; .31; .03; .02 G/100ML; G/100ML; G/100ML; G/100ML
500 INJECTION, SOLUTION INTRAVENOUS ONCE
Status: COMPLETED | OUTPATIENT
Start: 2022-01-01 | End: 2022-01-01

## 2022-01-01 RX ORDER — HYDROCODONE BITARTRATE AND ACETAMINOPHEN 5; 325 MG/1; MG/1
1 TABLET ORAL ONCE
Status: COMPLETED | OUTPATIENT
Start: 2022-01-01 | End: 2022-01-01

## 2022-01-01 RX ORDER — DIPHENHYDRAMINE HCL 25 MG
50 TABLET ORAL NIGHTLY PRN
Status: DISCONTINUED | OUTPATIENT
Start: 2022-01-01 | End: 2022-01-01 | Stop reason: HOSPADM

## 2022-01-01 RX ORDER — FERROUS SULFATE 325(65) MG
325 TABLET ORAL
Status: DISCONTINUED | OUTPATIENT
Start: 2022-01-01 | End: 2022-01-01 | Stop reason: CLARIF

## 2022-01-01 RX ORDER — TRAZODONE HYDROCHLORIDE 50 MG/1
50 TABLET ORAL NIGHTLY PRN
COMMUNITY
Start: 2022-01-01

## 2022-01-01 RX ORDER — ONDANSETRON 2 MG/ML
4 INJECTION INTRAMUSCULAR; INTRAVENOUS EVERY 6 HOURS PRN
Status: DISCONTINUED | OUTPATIENT
Start: 2022-01-01 | End: 2022-01-01 | Stop reason: HOSPADM

## 2022-01-01 RX ORDER — EPINEPHRINE 0.1 MG/ML
SYRINGE (ML) INJECTION DAILY PRN
Status: COMPLETED | OUTPATIENT
Start: 2022-01-01 | End: 2022-01-01

## 2022-01-01 RX ORDER — CASTOR OIL AND BALSAM, PERU 788; 87 MG/G; MG/G
OINTMENT TOPICAL 2 TIMES DAILY
Status: DISCONTINUED | OUTPATIENT
Start: 2022-01-01 | End: 2022-01-01 | Stop reason: HOSPADM

## 2022-01-01 RX ORDER — ASPIRIN 81 MG/1
81 TABLET, CHEWABLE ORAL DAILY
Status: DISCONTINUED | OUTPATIENT
Start: 2022-01-01 | End: 2022-01-01 | Stop reason: HOSPADM

## 2022-01-01 RX ORDER — ALBUMIN (HUMAN) 12.5 G/50ML
25 SOLUTION INTRAVENOUS EVERY 6 HOURS
Status: COMPLETED | OUTPATIENT
Start: 2022-01-01 | End: 2022-01-01

## 2022-01-01 RX ORDER — SODIUM CHLORIDE 9 MG/ML
INJECTION, SOLUTION INTRAVENOUS PRN
Status: DISCONTINUED | OUTPATIENT
Start: 2022-01-01 | End: 2022-01-01 | Stop reason: HOSPADM

## 2022-01-01 RX ORDER — CASTOR OIL AND BALSAM, PERU 788; 87 MG/G; MG/G
OINTMENT TOPICAL 2 TIMES DAILY
Qty: 28.35 G | Refills: 0
Start: 2022-01-01 | End: 2022-11-12

## 2022-01-01 RX ORDER — POLYETHYLENE GLYCOL 3350 17 G/17G
17 POWDER, FOR SOLUTION ORAL DAILY PRN
Status: DISCONTINUED | OUTPATIENT
Start: 2022-01-01 | End: 2022-01-01 | Stop reason: HOSPADM

## 2022-01-01 RX ORDER — GLIMEPIRIDE 2 MG/1
1 TABLET ORAL 2 TIMES DAILY
Status: DISCONTINUED | OUTPATIENT
Start: 2022-01-01 | End: 2022-01-01 | Stop reason: HOSPADM

## 2022-01-01 RX ORDER — ACETAMINOPHEN 325 MG/1
650 TABLET ORAL EVERY 6 HOURS PRN
Status: DISCONTINUED | OUTPATIENT
Start: 2022-01-01 | End: 2022-01-01

## 2022-01-01 RX ORDER — MIDODRINE HYDROCHLORIDE 5 MG/1
10 TABLET ORAL 3 TIMES DAILY PRN
Status: DISCONTINUED | OUTPATIENT
Start: 2022-01-01 | End: 2022-01-01 | Stop reason: SDUPTHER

## 2022-01-01 RX ORDER — HEPARIN SODIUM 1000 [USP'U]/ML
4500 INJECTION, SOLUTION INTRAVENOUS; SUBCUTANEOUS PRN
Status: CANCELLED | OUTPATIENT
Start: 2022-01-01

## 2022-01-01 RX ORDER — ONDANSETRON 4 MG/1
4 TABLET, ORALLY DISINTEGRATING ORAL EVERY 8 HOURS PRN
Status: DISCONTINUED | OUTPATIENT
Start: 2022-01-01 | End: 2022-01-01 | Stop reason: HOSPADM

## 2022-01-01 RX ORDER — SODIUM CHLORIDE 9 MG/ML
INJECTION, SOLUTION INTRAVENOUS PRN
Status: DISCONTINUED | OUTPATIENT
Start: 2022-01-01 | End: 2022-01-01

## 2022-01-01 RX ORDER — ASPIRIN 81 MG/1
81 TABLET, CHEWABLE ORAL DAILY
Qty: 30 TABLET | Refills: 3 | Status: SHIPPED
Start: 2022-01-01

## 2022-01-01 RX ORDER — PANTOPRAZOLE SODIUM 40 MG/1
40 TABLET, DELAYED RELEASE ORAL
Status: DISCONTINUED | OUTPATIENT
Start: 2022-01-01 | End: 2022-01-01 | Stop reason: HOSPADM

## 2022-01-01 RX ORDER — ACETAMINOPHEN 650 MG/1
650 SUPPOSITORY RECTAL EVERY 8 HOURS SCHEDULED
Status: DISCONTINUED | OUTPATIENT
Start: 2022-01-01 | End: 2022-01-01 | Stop reason: HOSPADM

## 2022-01-01 RX ORDER — SODIUM CHLORIDE 9 MG/ML
INJECTION, SOLUTION INTRAVENOUS ONCE
Status: COMPLETED | OUTPATIENT
Start: 2022-01-01 | End: 2022-01-01

## 2022-01-01 RX ORDER — DORZOLAMIDE HCL 20 MG/ML
SOLUTION/ DROPS OPHTHALMIC
COMMUNITY
Start: 2022-01-01

## 2022-01-01 RX ORDER — RIFABUTIN 150 MG/1
300 CAPSULE ORAL DAILY
COMMUNITY
Start: 2022-01-01 | End: 2022-11-19

## 2022-01-01 RX ORDER — TIMOLOL MALEATE 5 MG/ML
SOLUTION/ DROPS OPHTHALMIC
COMMUNITY
Start: 2022-01-01

## 2022-01-01 RX ORDER — SODIUM CHLORIDE 0.9 % (FLUSH) 0.9 %
5-40 SYRINGE (ML) INJECTION EVERY 12 HOURS SCHEDULED
Status: DISCONTINUED | OUTPATIENT
Start: 2022-01-01 | End: 2022-01-01 | Stop reason: HOSPADM

## 2022-01-01 RX ORDER — MIDODRINE HYDROCHLORIDE 5 MG/1
10 TABLET ORAL
Status: DISCONTINUED | OUTPATIENT
Start: 2022-01-01 | End: 2022-01-01 | Stop reason: HOSPADM

## 2022-01-01 RX ORDER — FERROUS SULFATE 325(65) MG
325 TABLET ORAL
COMMUNITY

## 2022-01-01 RX ORDER — MORPHINE SULFATE 4 MG/ML
INJECTION, SOLUTION INTRAMUSCULAR; INTRAVENOUS
Status: DISCONTINUED
Start: 2022-01-01 | End: 2022-01-01 | Stop reason: WASHOUT

## 2022-01-01 RX ORDER — HEPARIN SODIUM 1000 [USP'U]/ML
4500 INJECTION, SOLUTION INTRAVENOUS; SUBCUTANEOUS PRN
Status: DISCONTINUED | OUTPATIENT
Start: 2022-01-01 | End: 2022-01-01 | Stop reason: HOSPADM

## 2022-01-01 RX ORDER — FERROUS SULFATE 325(65) MG
325 TABLET ORAL
Status: DISCONTINUED | OUTPATIENT
Start: 2022-01-01 | End: 2022-01-01 | Stop reason: HOSPADM

## 2022-01-01 RX ORDER — ACETAMINOPHEN 325 MG/1
650 TABLET ORAL EVERY 8 HOURS SCHEDULED
Status: DISCONTINUED | OUTPATIENT
Start: 2022-01-01 | End: 2022-01-01 | Stop reason: HOSPADM

## 2022-01-01 RX ORDER — MAGNESIUM SULFATE IN WATER 40 MG/ML
4000 INJECTION, SOLUTION INTRAVENOUS ONCE
Status: COMPLETED | OUTPATIENT
Start: 2022-01-01 | End: 2022-01-01

## 2022-01-01 RX ORDER — MIDODRINE HYDROCHLORIDE 10 MG/1
10 TABLET ORAL 3 TIMES DAILY PRN
Qty: 90 TABLET | Refills: 0 | Status: SHIPPED
Start: 2022-01-01 | End: 2022-11-12

## 2022-01-01 RX ORDER — DORZOLAMIDE HCL 20 MG/ML
1 SOLUTION/ DROPS OPHTHALMIC 3 TIMES DAILY
Status: DISCONTINUED | OUTPATIENT
Start: 2022-01-01 | End: 2022-01-01 | Stop reason: HOSPADM

## 2022-01-01 RX ORDER — DIMETHICONE, CAMPHOR (SYNTHETIC), MENTHOL, AND PHENOL 1.1; .5; .625; .5 G/100G; G/100G; G/100G; G/100G
OINTMENT TOPICAL PRN
Status: DISCONTINUED | OUTPATIENT
Start: 2022-01-01 | End: 2022-01-01 | Stop reason: HOSPADM

## 2022-01-01 RX ORDER — HEPARIN SODIUM 5000 [USP'U]/ML
5000 INJECTION, SOLUTION INTRAVENOUS; SUBCUTANEOUS EVERY 8 HOURS SCHEDULED
Status: DISCONTINUED | OUTPATIENT
Start: 2022-01-01 | End: 2022-01-01 | Stop reason: HOSPADM

## 2022-01-01 RX ORDER — CEPHALEXIN 500 MG/1
500 CAPSULE ORAL EVERY 24 HOURS
COMMUNITY
Start: 2022-01-01

## 2022-01-01 RX ORDER — RIFABUTIN 150 MG/1
300 CAPSULE ORAL DAILY
Status: DISCONTINUED | OUTPATIENT
Start: 2022-01-01 | End: 2022-01-01 | Stop reason: HOSPADM

## 2022-01-01 RX ORDER — PANTOPRAZOLE SODIUM 40 MG/1
TABLET, DELAYED RELEASE ORAL
Status: ON HOLD | COMMUNITY
Start: 2022-01-01 | End: 2022-01-01 | Stop reason: SDUPTHER

## 2022-01-01 RX ORDER — MIDODRINE HYDROCHLORIDE 10 MG/1
10 TABLET ORAL 3 TIMES DAILY PRN
Status: ON HOLD | COMMUNITY
Start: 2022-01-01 | End: 2022-01-01 | Stop reason: SDUPTHER

## 2022-01-01 RX ORDER — ALBUMIN (HUMAN) 12.5 G/50ML
25 SOLUTION INTRAVENOUS PRN
Status: DISCONTINUED | OUTPATIENT
Start: 2022-01-01 | End: 2022-01-01 | Stop reason: HOSPADM

## 2022-01-01 RX ORDER — POLYETHYLENE GLYCOL 3350 17 G/17G
17 POWDER, FOR SOLUTION ORAL DAILY
Status: DISCONTINUED | OUTPATIENT
Start: 2022-01-01 | End: 2022-01-01 | Stop reason: HOSPADM

## 2022-01-01 RX ORDER — PANTOPRAZOLE SODIUM 40 MG/1
40 TABLET, DELAYED RELEASE ORAL 2 TIMES DAILY
Qty: 30 TABLET | Refills: 1
Start: 2022-01-01

## 2022-01-01 RX ORDER — HYDROXYZINE HYDROCHLORIDE 10 MG/1
10 TABLET, FILM COATED ORAL ONCE
Status: COMPLETED | OUTPATIENT
Start: 2022-01-01 | End: 2022-01-01

## 2022-01-01 RX ORDER — POTASSIUM CHLORIDE 20 MEQ/1
20 TABLET, EXTENDED RELEASE ORAL ONCE
Status: COMPLETED | OUTPATIENT
Start: 2022-01-01 | End: 2022-01-01

## 2022-01-01 RX ORDER — POLYETHYLENE GLYCOL 3350 17 G/17G
17 POWDER, FOR SOLUTION ORAL DAILY
COMMUNITY
Start: 2022-01-01

## 2022-01-01 RX ORDER — 0.9 % SODIUM CHLORIDE 0.9 %
250 INTRAVENOUS SOLUTION INTRAVENOUS ONCE
Status: COMPLETED | OUTPATIENT
Start: 2022-01-01 | End: 2022-01-01

## 2022-01-01 RX ORDER — PANTOPRAZOLE SODIUM 40 MG/1
40 TABLET, DELAYED RELEASE ORAL
Status: DISCONTINUED | OUTPATIENT
Start: 2022-01-01 | End: 2022-01-01

## 2022-01-01 RX ORDER — MIRTAZAPINE 15 MG/1
15 TABLET, FILM COATED ORAL NIGHTLY
Status: DISCONTINUED | OUTPATIENT
Start: 2022-01-01 | End: 2022-01-01 | Stop reason: HOSPADM

## 2022-01-01 RX ORDER — CEPHALEXIN 500 MG/1
500 CAPSULE ORAL DAILY
Status: DISCONTINUED | OUTPATIENT
Start: 2022-01-01 | End: 2022-01-01 | Stop reason: HOSPADM

## 2022-01-01 RX ORDER — MECOBALAMIN 5000 MCG
5 TABLET,DISINTEGRATING ORAL NIGHTLY
Status: DISCONTINUED | OUTPATIENT
Start: 2022-01-01 | End: 2022-01-01 | Stop reason: HOSPADM

## 2022-01-01 RX ORDER — POTASSIUM CHLORIDE 20 MEQ/1
20 TABLET, EXTENDED RELEASE ORAL ONCE
Status: DISCONTINUED | OUTPATIENT
Start: 2022-01-01 | End: 2022-01-01

## 2022-01-01 RX ORDER — ATORVASTATIN CALCIUM 80 MG/1
TABLET, FILM COATED ORAL
COMMUNITY
Start: 2022-01-01

## 2022-01-01 RX ORDER — SODIUM CHLORIDE 0.9 % (FLUSH) 0.9 %
5-40 SYRINGE (ML) INJECTION PRN
Status: DISCONTINUED | OUTPATIENT
Start: 2022-01-01 | End: 2022-01-01 | Stop reason: HOSPADM

## 2022-01-01 RX ORDER — ATORVASTATIN CALCIUM 80 MG/1
80 TABLET, FILM COATED ORAL NIGHTLY
Status: DISCONTINUED | OUTPATIENT
Start: 2022-01-01 | End: 2022-01-01 | Stop reason: HOSPADM

## 2022-01-01 RX ORDER — ACETAMINOPHEN 650 MG/1
650 SUPPOSITORY RECTAL EVERY 6 HOURS PRN
Status: DISCONTINUED | OUTPATIENT
Start: 2022-01-01 | End: 2022-01-01

## 2022-01-01 RX ADMIN — ACETAMINOPHEN 650 MG: 325 TABLET, FILM COATED ORAL at 05:41

## 2022-01-01 RX ADMIN — COLLAGENASE SANTYL: 250 OINTMENT TOPICAL at 17:00

## 2022-01-01 RX ADMIN — ACETAMINOPHEN 650 MG: 325 TABLET, FILM COATED ORAL at 05:56

## 2022-01-01 RX ADMIN — TIMOLOL MALEATE 1 DROP: 2.5 SOLUTION OPHTHALMIC at 08:21

## 2022-01-01 RX ADMIN — ASPIRIN 81 MG 81 MG: 81 TABLET ORAL at 13:01

## 2022-01-01 RX ADMIN — IRON SUCROSE 100 MG: 20 INJECTION, SOLUTION INTRAVENOUS at 10:07

## 2022-01-01 RX ADMIN — POLYETHYLENE GLYCOL 3350 17 G: 17 POWDER, FOR SOLUTION ORAL at 07:43

## 2022-01-01 RX ADMIN — MAGNESIUM SULFATE HEPTAHYDRATE 4000 MG: 40 INJECTION, SOLUTION INTRAVENOUS at 14:19

## 2022-01-01 RX ADMIN — RIFABUTIN 300 MG: 150 CAPSULE ORAL at 08:32

## 2022-01-01 RX ADMIN — FERROUS SULFATE TAB 325 MG (65 MG ELEMENTAL FE) 325 MG: 325 (65 FE) TAB at 09:54

## 2022-01-01 RX ADMIN — PANTOPRAZOLE SODIUM 40 MG: 40 TABLET, DELAYED RELEASE ORAL at 07:24

## 2022-01-01 RX ADMIN — DORZOLAMIDE HYDROCHLORIDE 1 DROP: 20 SOLUTION/ DROPS OPHTHALMIC at 09:46

## 2022-01-01 RX ADMIN — HYDROCODONE BITARTRATE AND ACETAMINOPHEN 1 TABLET: 5; 325 TABLET ORAL at 09:44

## 2022-01-01 RX ADMIN — Medication 5 MG: at 20:51

## 2022-01-01 RX ADMIN — SODIUM CHLORIDE, PRESERVATIVE FREE 10 ML: 5 INJECTION INTRAVENOUS at 09:48

## 2022-01-01 RX ADMIN — RIFABUTIN 300 MG: 150 CAPSULE ORAL at 07:43

## 2022-01-01 RX ADMIN — SODIUM CHLORIDE, PRESERVATIVE FREE 10 ML: 5 INJECTION INTRAVENOUS at 13:05

## 2022-01-01 RX ADMIN — HEPARIN SODIUM 5000 UNITS: 5000 INJECTION INTRAVENOUS; SUBCUTANEOUS at 18:37

## 2022-01-01 RX ADMIN — TIMOLOL MALEATE 1 DROP: 2.5 SOLUTION OPHTHALMIC at 20:43

## 2022-01-01 RX ADMIN — FERROUS SULFATE TAB 325 MG (65 MG ELEMENTAL FE) 325 MG: 325 (65 FE) TAB at 13:01

## 2022-01-01 RX ADMIN — DIPHENHYDRAMINE HYDROCHLORIDE 50 MG: 25 TABLET ORAL at 23:15

## 2022-01-01 RX ADMIN — POLYETHYLENE GLYCOL 3350 17 G: 17 POWDER, FOR SOLUTION ORAL at 08:24

## 2022-01-01 RX ADMIN — PANTOPRAZOLE SODIUM 40 MG: 40 TABLET, DELAYED RELEASE ORAL at 15:11

## 2022-01-01 RX ADMIN — DORZOLAMIDE HYDROCHLORIDE 1 DROP: 20 SOLUTION/ DROPS OPHTHALMIC at 07:52

## 2022-01-01 RX ADMIN — EPOETIN ALFA-EPBX 10000 UNITS: 10000 INJECTION, SOLUTION INTRAVENOUS; SUBCUTANEOUS at 17:31

## 2022-01-01 RX ADMIN — ATORVASTATIN CALCIUM 80 MG: 80 TABLET, FILM COATED ORAL at 21:39

## 2022-01-01 RX ADMIN — TIMOLOL MALEATE 1 DROP: 2.5 SOLUTION OPHTHALMIC at 22:08

## 2022-01-01 RX ADMIN — DORZOLAMIDE HYDROCHLORIDE 1 DROP: 20 SOLUTION/ DROPS OPHTHALMIC at 20:43

## 2022-01-01 RX ADMIN — FERROUS SULFATE TAB 325 MG (65 MG ELEMENTAL FE) 325 MG: 325 (65 FE) TAB at 11:47

## 2022-01-01 RX ADMIN — ATORVASTATIN CALCIUM 80 MG: 80 TABLET, FILM COATED ORAL at 21:22

## 2022-01-01 RX ADMIN — SODIUM CHLORIDE, PRESERVATIVE FREE 10 ML: 5 INJECTION INTRAVENOUS at 21:07

## 2022-01-01 RX ADMIN — CEPHALEXIN 500 MG: 250 CAPSULE ORAL at 09:45

## 2022-01-01 RX ADMIN — PANTOPRAZOLE SODIUM 40 MG: 40 TABLET, DELAYED RELEASE ORAL at 18:36

## 2022-01-01 RX ADMIN — SODIUM CHLORIDE, PRESERVATIVE FREE 10 ML: 5 INJECTION INTRAVENOUS at 20:39

## 2022-01-01 RX ADMIN — MIDODRINE HYDROCHLORIDE 10 MG: 5 TABLET ORAL at 12:15

## 2022-01-01 RX ADMIN — MIDODRINE HYDROCHLORIDE 10 MG: 5 TABLET ORAL at 08:17

## 2022-01-01 RX ADMIN — DORZOLAMIDE HYDROCHLORIDE 1 DROP: 20 SOLUTION/ DROPS OPHTHALMIC at 14:57

## 2022-01-01 RX ADMIN — ACETAMINOPHEN 650 MG: 325 TABLET, FILM COATED ORAL at 14:03

## 2022-01-01 RX ADMIN — MIDODRINE HYDROCHLORIDE 10 MG: 5 TABLET ORAL at 18:17

## 2022-01-01 RX ADMIN — TIMOLOL MALEATE 1 DROP: 2.5 SOLUTION OPHTHALMIC at 21:24

## 2022-01-01 RX ADMIN — TIMOLOL MALEATE 1 DROP: 2.5 SOLUTION OPHTHALMIC at 23:35

## 2022-01-01 RX ADMIN — RIFABUTIN 300 MG: 150 CAPSULE ORAL at 08:33

## 2022-01-01 RX ADMIN — Medication: at 21:00

## 2022-01-01 RX ADMIN — Medication: at 20:44

## 2022-01-01 RX ADMIN — EPOETIN ALFA-EPBX 10000 UNITS: 10000 INJECTION, SOLUTION INTRAVENOUS; SUBCUTANEOUS at 08:19

## 2022-01-01 RX ADMIN — TRAZODONE HYDROCHLORIDE 50 MG: 50 TABLET, FILM COATED ORAL at 21:14

## 2022-01-01 RX ADMIN — HYDROXYZINE HYDROCHLORIDE 10 MG: 10 TABLET ORAL at 00:37

## 2022-01-01 RX ADMIN — DORZOLAMIDE HYDROCHLORIDE 1 DROP: 20 SOLUTION/ DROPS OPHTHALMIC at 08:28

## 2022-01-01 RX ADMIN — DORZOLAMIDE HYDROCHLORIDE 1 DROP: 20 SOLUTION/ DROPS OPHTHALMIC at 13:16

## 2022-01-01 RX ADMIN — SODIUM CHLORIDE: 9 INJECTION, SOLUTION INTRAVENOUS at 13:26

## 2022-01-01 RX ADMIN — IRON SUCROSE 100 MG: 20 INJECTION, SOLUTION INTRAVENOUS at 09:58

## 2022-01-01 RX ADMIN — TRAZODONE HYDROCHLORIDE 50 MG: 50 TABLET, FILM COATED ORAL at 23:53

## 2022-01-01 RX ADMIN — SODIUM CHLORIDE, PRESERVATIVE FREE 10 ML: 5 INJECTION INTRAVENOUS at 20:41

## 2022-01-01 RX ADMIN — ACETAMINOPHEN 650 MG: 325 TABLET, FILM COATED ORAL at 23:02

## 2022-01-01 RX ADMIN — ALBUMIN (HUMAN) 25 G: 0.25 INJECTION, SOLUTION INTRAVENOUS at 12:53

## 2022-01-01 RX ADMIN — PANTOPRAZOLE SODIUM 40 MG: 40 TABLET, DELAYED RELEASE ORAL at 16:26

## 2022-01-01 RX ADMIN — CEPHALEXIN 500 MG: 250 CAPSULE ORAL at 08:24

## 2022-01-01 RX ADMIN — MIDODRINE HYDROCHLORIDE 10 MG: 5 TABLET ORAL at 12:54

## 2022-01-01 RX ADMIN — SODIUM CHLORIDE: 9 INJECTION, SOLUTION INTRAVENOUS at 13:47

## 2022-01-01 RX ADMIN — HEPARIN SODIUM 5000 UNITS: 5000 INJECTION INTRAVENOUS; SUBCUTANEOUS at 06:24

## 2022-01-01 RX ADMIN — TRAZODONE HYDROCHLORIDE 50 MG: 50 TABLET, FILM COATED ORAL at 20:50

## 2022-01-01 RX ADMIN — HEPARIN SODIUM 5000 UNITS: 5000 INJECTION INTRAVENOUS; SUBCUTANEOUS at 06:51

## 2022-01-01 RX ADMIN — SODIUM PHOSPHATE, MONOBASIC, MONOHYDRATE 20 MMOL: 276; 142 INJECTION, SOLUTION INTRAVENOUS at 10:43

## 2022-01-01 RX ADMIN — Medication: at 22:41

## 2022-01-01 RX ADMIN — FERROUS SULFATE TAB 325 MG (65 MG ELEMENTAL FE) 325 MG: 325 (65 FE) TAB at 08:29

## 2022-01-01 RX ADMIN — Medication: at 08:33

## 2022-01-01 RX ADMIN — TRAZODONE HYDROCHLORIDE 50 MG: 50 TABLET, FILM COATED ORAL at 20:43

## 2022-01-01 RX ADMIN — MIDODRINE HYDROCHLORIDE 10 MG: 5 TABLET ORAL at 12:18

## 2022-01-01 RX ADMIN — MIDODRINE HYDROCHLORIDE 10 MG: 5 TABLET ORAL at 18:42

## 2022-01-01 RX ADMIN — Medication: at 23:28

## 2022-01-01 RX ADMIN — MIDODRINE HYDROCHLORIDE 10 MG: 5 TABLET ORAL at 18:11

## 2022-01-01 RX ADMIN — PANTOPRAZOLE SODIUM 40 MG: 40 TABLET, DELAYED RELEASE ORAL at 06:23

## 2022-01-01 RX ADMIN — SODIUM CHLORIDE, PRESERVATIVE FREE 10 ML: 5 INJECTION INTRAVENOUS at 23:53

## 2022-01-01 RX ADMIN — ALBUMIN (HUMAN) 25 G: 0.25 INJECTION, SOLUTION INTRAVENOUS at 16:47

## 2022-01-01 RX ADMIN — DORZOLAMIDE HYDROCHLORIDE 1 DROP: 20 SOLUTION/ DROPS OPHTHALMIC at 21:53

## 2022-01-01 RX ADMIN — TIMOLOL MALEATE 1 DROP: 2.5 SOLUTION OPHTHALMIC at 07:52

## 2022-01-01 RX ADMIN — TIMOLOL MALEATE 1 DROP: 2.5 SOLUTION OPHTHALMIC at 20:19

## 2022-01-01 RX ADMIN — SODIUM CHLORIDE, PRESERVATIVE FREE 10 ML: 5 INJECTION INTRAVENOUS at 08:23

## 2022-01-01 RX ADMIN — ATORVASTATIN CALCIUM 80 MG: 80 TABLET, FILM COATED ORAL at 20:50

## 2022-01-01 RX ADMIN — SODIUM CHLORIDE, PRESERVATIVE FREE 10 ML: 5 INJECTION INTRAVENOUS at 08:32

## 2022-01-01 RX ADMIN — HEPARIN SODIUM 5000 UNITS: 5000 INJECTION INTRAVENOUS; SUBCUTANEOUS at 05:02

## 2022-01-01 RX ADMIN — ACETAMINOPHEN 650 MG: 325 TABLET, FILM COATED ORAL at 13:23

## 2022-01-01 RX ADMIN — MIDODRINE HYDROCHLORIDE 10 MG: 5 TABLET ORAL at 10:18

## 2022-01-01 RX ADMIN — Medication 5 MG: at 20:18

## 2022-01-01 RX ADMIN — ACETAMINOPHEN 650 MG: 325 TABLET, FILM COATED ORAL at 12:15

## 2022-01-01 RX ADMIN — SODIUM CHLORIDE, PRESERVATIVE FREE 10 ML: 5 INJECTION INTRAVENOUS at 23:36

## 2022-01-01 RX ADMIN — DIBASIC SODIUM PHOSPHATE, MONOBASIC POTASSIUM PHOSPHATE AND MONOBASIC SODIUM PHOSPHATE 1 TABLET: 852; 155; 130 TABLET ORAL at 20:41

## 2022-01-01 RX ADMIN — IRON SUCROSE 100 MG: 20 INJECTION, SOLUTION INTRAVENOUS at 12:12

## 2022-01-01 RX ADMIN — Medication 5 MG: at 21:54

## 2022-01-01 RX ADMIN — SODIUM CHLORIDE, PRESERVATIVE FREE 10 ML: 5 INJECTION INTRAVENOUS at 21:23

## 2022-01-01 RX ADMIN — MIDODRINE HYDROCHLORIDE 10 MG: 5 TABLET ORAL at 13:14

## 2022-01-01 RX ADMIN — COLLAGENASE SANTYL: 250 OINTMENT TOPICAL at 07:53

## 2022-01-01 RX ADMIN — PANTOPRAZOLE SODIUM 40 MG: 40 TABLET, DELAYED RELEASE ORAL at 05:13

## 2022-01-01 RX ADMIN — Medication: at 09:50

## 2022-01-01 RX ADMIN — MIDODRINE HYDROCHLORIDE 10 MG: 5 TABLET ORAL at 05:29

## 2022-01-01 RX ADMIN — TRAZODONE HYDROCHLORIDE 50 MG: 50 TABLET, FILM COATED ORAL at 21:06

## 2022-01-01 RX ADMIN — EPOETIN ALFA-EPBX 20000 UNITS: 10000 INJECTION, SOLUTION INTRAVENOUS; SUBCUTANEOUS at 15:24

## 2022-01-01 RX ADMIN — DIBASIC SODIUM PHOSPHATE, MONOBASIC POTASSIUM PHOSPHATE AND MONOBASIC SODIUM PHOSPHATE 1 TABLET: 852; 155; 130 TABLET ORAL at 17:32

## 2022-01-01 RX ADMIN — CEPHALEXIN 500 MG: 250 CAPSULE ORAL at 07:53

## 2022-01-01 RX ADMIN — COLLAGENASE SANTYL: 250 OINTMENT TOPICAL at 11:41

## 2022-01-01 RX ADMIN — DIBASIC SODIUM PHOSPHATE, MONOBASIC POTASSIUM PHOSPHATE AND MONOBASIC SODIUM PHOSPHATE 1 TABLET: 852; 155; 130 TABLET ORAL at 12:14

## 2022-01-01 RX ADMIN — Medication 5 MG: at 21:14

## 2022-01-01 RX ADMIN — SODIUM CHLORIDE, PRESERVATIVE FREE 10 ML: 5 INJECTION INTRAVENOUS at 22:08

## 2022-01-01 RX ADMIN — ALBUMIN (HUMAN) 25 G: 0.25 INJECTION, SOLUTION INTRAVENOUS at 13:34

## 2022-01-01 RX ADMIN — DORZOLAMIDE HYDROCHLORIDE 1 DROP: 20 SOLUTION/ DROPS OPHTHALMIC at 08:29

## 2022-01-01 RX ADMIN — DORZOLAMIDE HYDROCHLORIDE 1 DROP: 20 SOLUTION/ DROPS OPHTHALMIC at 09:55

## 2022-01-01 RX ADMIN — Medication 5 MG: at 21:22

## 2022-01-01 RX ADMIN — PANTOPRAZOLE SODIUM 40 MG: 40 TABLET, DELAYED RELEASE ORAL at 09:44

## 2022-01-01 RX ADMIN — MIDODRINE HYDROCHLORIDE 10 MG: 5 TABLET ORAL at 12:14

## 2022-01-01 RX ADMIN — DORZOLAMIDE HYDROCHLORIDE 1 DROP: 20 SOLUTION/ DROPS OPHTHALMIC at 16:13

## 2022-01-01 RX ADMIN — MIDODRINE HYDROCHLORIDE 10 MG: 5 TABLET ORAL at 12:02

## 2022-01-01 RX ADMIN — EPINEPHRINE 1 MG: 0.1 INJECTION, SOLUTION ENDOTRACHEAL; INTRACARDIAC; INTRAVENOUS at 16:06

## 2022-01-01 RX ADMIN — ACETAMINOPHEN 650 MG: 325 TABLET, FILM COATED ORAL at 08:18

## 2022-01-01 RX ADMIN — MIDODRINE HYDROCHLORIDE 10 MG: 5 TABLET ORAL at 14:02

## 2022-01-01 RX ADMIN — TRAZODONE HYDROCHLORIDE 50 MG: 50 TABLET, FILM COATED ORAL at 20:18

## 2022-01-01 RX ADMIN — HEPARIN SODIUM 5000 UNITS: 5000 INJECTION INTRAVENOUS; SUBCUTANEOUS at 17:23

## 2022-01-01 RX ADMIN — ACETAMINOPHEN 650 MG: 325 TABLET, FILM COATED ORAL at 22:53

## 2022-01-01 RX ADMIN — DORZOLAMIDE HYDROCHLORIDE 1 DROP: 20 SOLUTION/ DROPS OPHTHALMIC at 20:52

## 2022-01-01 RX ADMIN — TIMOLOL MALEATE 1 DROP: 2.5 SOLUTION OPHTHALMIC at 21:53

## 2022-01-01 RX ADMIN — DORZOLAMIDE HYDROCHLORIDE 1 DROP: 20 SOLUTION/ DROPS OPHTHALMIC at 23:52

## 2022-01-01 RX ADMIN — FERROUS SULFATE TAB 325 MG (65 MG ELEMENTAL FE) 325 MG: 325 (65 FE) TAB at 08:22

## 2022-01-01 RX ADMIN — ALBUMIN (HUMAN) 25 G: 0.25 INJECTION, SOLUTION INTRAVENOUS at 21:22

## 2022-01-01 RX ADMIN — SODIUM CHLORIDE, PRESERVATIVE FREE 10 ML: 5 INJECTION INTRAVENOUS at 22:54

## 2022-01-01 RX ADMIN — HEPARIN SODIUM 5000 UNITS: 5000 INJECTION INTRAVENOUS; SUBCUTANEOUS at 05:12

## 2022-01-01 RX ADMIN — ATORVASTATIN CALCIUM 80 MG: 80 TABLET, FILM COATED ORAL at 21:54

## 2022-01-01 RX ADMIN — HEPARIN SODIUM 5000 UNITS: 5000 INJECTION INTRAVENOUS; SUBCUTANEOUS at 16:13

## 2022-01-01 RX ADMIN — PANTOPRAZOLE SODIUM 40 MG: 40 TABLET, DELAYED RELEASE ORAL at 15:22

## 2022-01-01 RX ADMIN — RIFABUTIN 300 MG: 150 CAPSULE ORAL at 07:55

## 2022-01-01 RX ADMIN — MIDODRINE HYDROCHLORIDE 10 MG: 5 TABLET ORAL at 16:49

## 2022-01-01 RX ADMIN — MIDODRINE HYDROCHLORIDE 10 MG: 5 TABLET ORAL at 09:54

## 2022-01-01 RX ADMIN — ATORVASTATIN CALCIUM 80 MG: 80 TABLET, FILM COATED ORAL at 22:53

## 2022-01-01 RX ADMIN — Medication: at 17:32

## 2022-01-01 RX ADMIN — COLLAGENASE SANTYL: 250 OINTMENT TOPICAL at 08:19

## 2022-01-01 RX ADMIN — FERROUS SULFATE TAB 325 MG (65 MG ELEMENTAL FE) 325 MG: 325 (65 FE) TAB at 07:52

## 2022-01-01 RX ADMIN — ACETAMINOPHEN 650 MG: 325 TABLET, FILM COATED ORAL at 14:16

## 2022-01-01 RX ADMIN — APIXABAN 2.5 MG: 5 TABLET, FILM COATED ORAL at 08:03

## 2022-01-01 RX ADMIN — RIFABUTIN 300 MG: 150 CAPSULE ORAL at 08:55

## 2022-01-01 RX ADMIN — ACETAMINOPHEN 650 MG: 325 TABLET, FILM COATED ORAL at 05:13

## 2022-01-01 RX ADMIN — CEPHALEXIN 500 MG: 250 CAPSULE ORAL at 08:23

## 2022-01-01 RX ADMIN — PANTOPRAZOLE SODIUM 40 MG: 40 TABLET, DELAYED RELEASE ORAL at 06:52

## 2022-01-01 RX ADMIN — POTASSIUM CHLORIDE 20 MEQ: 1500 TABLET, EXTENDED RELEASE ORAL at 13:23

## 2022-01-01 RX ADMIN — ATORVASTATIN CALCIUM 80 MG: 80 TABLET, FILM COATED ORAL at 20:18

## 2022-01-01 RX ADMIN — SODIUM CHLORIDE 250 ML: 9 INJECTION, SOLUTION INTRAVENOUS at 01:25

## 2022-01-01 RX ADMIN — ATORVASTATIN CALCIUM 80 MG: 80 TABLET, FILM COATED ORAL at 21:06

## 2022-01-01 RX ADMIN — PANTOPRAZOLE SODIUM 40 MG: 40 TABLET, DELAYED RELEASE ORAL at 06:39

## 2022-01-01 RX ADMIN — TIMOLOL MALEATE 1 DROP: 2.5 SOLUTION OPHTHALMIC at 08:07

## 2022-01-01 RX ADMIN — ACETAMINOPHEN 650 MG: 325 TABLET, FILM COATED ORAL at 05:58

## 2022-01-01 RX ADMIN — APIXABAN 2.5 MG: 5 TABLET, FILM COATED ORAL at 20:50

## 2022-01-01 RX ADMIN — MIDODRINE HYDROCHLORIDE 10 MG: 5 TABLET ORAL at 11:48

## 2022-01-01 RX ADMIN — Medication: at 07:54

## 2022-01-01 RX ADMIN — ALBUMIN (HUMAN) 25 G: 0.25 INJECTION, SOLUTION INTRAVENOUS at 17:38

## 2022-01-01 RX ADMIN — POLYETHYLENE GLYCOL 3350 17 G: 17 POWDER, FOR SOLUTION ORAL at 11:47

## 2022-01-01 RX ADMIN — APIXABAN 2.5 MG: 5 TABLET, FILM COATED ORAL at 07:43

## 2022-01-01 RX ADMIN — POLYETHYLENE GLYCOL 3350 17 G: 17 POWDER, FOR SOLUTION ORAL at 08:20

## 2022-01-01 RX ADMIN — TRAZODONE HYDROCHLORIDE 50 MG: 50 TABLET, FILM COATED ORAL at 23:02

## 2022-01-01 RX ADMIN — SODIUM CHLORIDE, PRESERVATIVE FREE 5 ML: 5 INJECTION INTRAVENOUS at 07:55

## 2022-01-01 RX ADMIN — PANTOPRAZOLE SODIUM 40 MG: 40 TABLET, DELAYED RELEASE ORAL at 16:55

## 2022-01-01 RX ADMIN — ALBUMIN (HUMAN) 25 G: 0.25 INJECTION, SOLUTION INTRAVENOUS at 10:21

## 2022-01-01 RX ADMIN — Medication: at 21:30

## 2022-01-01 RX ADMIN — DORZOLAMIDE HYDROCHLORIDE 1 DROP: 20 SOLUTION/ DROPS OPHTHALMIC at 13:10

## 2022-01-01 RX ADMIN — EPOETIN ALFA-EPBX 20000 UNITS: 10000 INJECTION, SOLUTION INTRAVENOUS; SUBCUTANEOUS at 09:50

## 2022-01-01 RX ADMIN — COLLAGENASE SANTYL: 250 OINTMENT TOPICAL at 09:46

## 2022-01-01 RX ADMIN — ACETAMINOPHEN 650 MG: 325 TABLET, FILM COATED ORAL at 21:14

## 2022-01-01 RX ADMIN — ACETAMINOPHEN 650 MG: 325 TABLET, FILM COATED ORAL at 01:40

## 2022-01-01 RX ADMIN — COLLAGENASE SANTYL: 250 OINTMENT TOPICAL at 13:06

## 2022-01-01 RX ADMIN — SODIUM CHLORIDE, PRESERVATIVE FREE 10 ML: 5 INJECTION INTRAVENOUS at 10:30

## 2022-01-01 RX ADMIN — ACETAMINOPHEN 650 MG: 325 TABLET, FILM COATED ORAL at 21:55

## 2022-01-01 RX ADMIN — PANTOPRAZOLE SODIUM 8 MG/HR: 40 INJECTION, POWDER, LYOPHILIZED, FOR SOLUTION INTRAVENOUS at 11:34

## 2022-01-01 RX ADMIN — DORZOLAMIDE HYDROCHLORIDE 1 DROP: 20 SOLUTION/ DROPS OPHTHALMIC at 20:42

## 2022-01-01 RX ADMIN — DORZOLAMIDE HYDROCHLORIDE 1 DROP: 20 SOLUTION/ DROPS OPHTHALMIC at 08:50

## 2022-01-01 RX ADMIN — TIMOLOL MALEATE 1 DROP: 2.5 SOLUTION OPHTHALMIC at 20:40

## 2022-01-01 RX ADMIN — TRAZODONE HYDROCHLORIDE 50 MG: 50 TABLET, FILM COATED ORAL at 21:54

## 2022-01-01 RX ADMIN — SODIUM CHLORIDE, PRESERVATIVE FREE 10 ML: 5 INJECTION INTRAVENOUS at 21:47

## 2022-01-01 RX ADMIN — ACETAMINOPHEN 650 MG: 325 TABLET, FILM COATED ORAL at 16:13

## 2022-01-01 RX ADMIN — CEPHALEXIN 500 MG: 250 CAPSULE ORAL at 08:03

## 2022-01-01 RX ADMIN — DIBASIC SODIUM PHOSPHATE, MONOBASIC POTASSIUM PHOSPHATE AND MONOBASIC SODIUM PHOSPHATE 1 TABLET: 852; 155; 130 TABLET ORAL at 14:16

## 2022-01-01 RX ADMIN — TIMOLOL MALEATE 1 DROP: 2.5 SOLUTION OPHTHALMIC at 08:50

## 2022-01-01 RX ADMIN — DORZOLAMIDE HYDROCHLORIDE 1 DROP: 20 SOLUTION/ DROPS OPHTHALMIC at 08:21

## 2022-01-01 RX ADMIN — ALBUMIN (HUMAN) 25 G: 0.25 INJECTION, SOLUTION INTRAVENOUS at 18:17

## 2022-01-01 RX ADMIN — ACETAMINOPHEN 650 MG: 325 TABLET, FILM COATED ORAL at 23:15

## 2022-01-01 RX ADMIN — MIDODRINE HYDROCHLORIDE 10 MG: 5 TABLET ORAL at 16:12

## 2022-01-01 RX ADMIN — TIMOLOL MALEATE 1 DROP: 2.5 SOLUTION OPHTHALMIC at 21:09

## 2022-01-01 RX ADMIN — SODIUM CHLORIDE, PRESERVATIVE FREE 10 ML: 5 INJECTION INTRAVENOUS at 09:49

## 2022-01-01 RX ADMIN — Medication: at 20:41

## 2022-01-01 RX ADMIN — CEFAZOLIN 2000 MG: 2 INJECTION, POWDER, FOR SOLUTION INTRAMUSCULAR; INTRAVENOUS at 20:45

## 2022-01-01 RX ADMIN — CEPHALEXIN 500 MG: 250 CAPSULE ORAL at 09:44

## 2022-01-01 RX ADMIN — MIDODRINE HYDROCHLORIDE 10 MG: 5 TABLET ORAL at 17:25

## 2022-01-01 RX ADMIN — TIMOLOL MALEATE 1 DROP: 2.5 SOLUTION OPHTHALMIC at 21:46

## 2022-01-01 RX ADMIN — DORZOLAMIDE HYDROCHLORIDE 1 DROP: 20 SOLUTION/ DROPS OPHTHALMIC at 20:45

## 2022-01-01 RX ADMIN — FERROUS SULFATE TAB 325 MG (65 MG ELEMENTAL FE) 325 MG: 325 (65 FE) TAB at 08:50

## 2022-01-01 RX ADMIN — MIRTAZAPINE 15 MG: 15 TABLET, FILM COATED ORAL at 23:53

## 2022-01-01 RX ADMIN — MIDODRINE HYDROCHLORIDE 10 MG: 5 TABLET ORAL at 12:00

## 2022-01-01 RX ADMIN — Medication 5 MG: at 23:02

## 2022-01-01 RX ADMIN — Medication 5 MG: at 23:07

## 2022-01-01 RX ADMIN — TIMOLOL MALEATE 1 DROP: 2.5 SOLUTION OPHTHALMIC at 08:28

## 2022-01-01 RX ADMIN — DORZOLAMIDE HYDROCHLORIDE 1 DROP: 20 SOLUTION/ DROPS OPHTHALMIC at 15:23

## 2022-01-01 RX ADMIN — SODIUM CHLORIDE, PRESERVATIVE FREE 10 ML: 5 INJECTION INTRAVENOUS at 20:42

## 2022-01-01 RX ADMIN — ALBUMIN (HUMAN) 25 G: 0.25 INJECTION, SOLUTION INTRAVENOUS at 09:31

## 2022-01-01 RX ADMIN — MIDODRINE HYDROCHLORIDE 10 MG: 5 TABLET ORAL at 17:20

## 2022-01-01 RX ADMIN — DORZOLAMIDE HYDROCHLORIDE 1 DROP: 20 SOLUTION/ DROPS OPHTHALMIC at 14:04

## 2022-01-01 RX ADMIN — Medication: at 17:00

## 2022-01-01 RX ADMIN — TIMOLOL MALEATE 1 DROP: 2.5 SOLUTION OPHTHALMIC at 23:52

## 2022-01-01 RX ADMIN — PANTOPRAZOLE SODIUM 8 MG/HR: 40 INJECTION, POWDER, LYOPHILIZED, FOR SOLUTION INTRAVENOUS at 01:46

## 2022-01-01 RX ADMIN — SODIUM CHLORIDE, PRESERVATIVE FREE 10 ML: 5 INJECTION INTRAVENOUS at 20:51

## 2022-01-01 RX ADMIN — Medication: at 07:53

## 2022-01-01 RX ADMIN — TIMOLOL MALEATE 1 DROP: 2.5 SOLUTION OPHTHALMIC at 09:49

## 2022-01-01 RX ADMIN — MIDODRINE HYDROCHLORIDE 10 MG: 5 TABLET ORAL at 09:44

## 2022-01-01 RX ADMIN — APIXABAN 2.5 MG: 5 TABLET, FILM COATED ORAL at 08:27

## 2022-01-01 RX ADMIN — HEPARIN SODIUM 5000 UNITS: 5000 INJECTION INTRAVENOUS; SUBCUTANEOUS at 20:39

## 2022-01-01 RX ADMIN — ASPIRIN 81 MG 81 MG: 81 TABLET ORAL at 08:03

## 2022-01-01 RX ADMIN — SODIUM CHLORIDE, PRESERVATIVE FREE 10 ML: 5 INJECTION INTRAVENOUS at 20:19

## 2022-01-01 RX ADMIN — ATORVASTATIN CALCIUM 80 MG: 80 TABLET, FILM COATED ORAL at 20:41

## 2022-01-01 RX ADMIN — PANTOPRAZOLE SODIUM 40 MG: 40 TABLET, DELAYED RELEASE ORAL at 05:56

## 2022-01-01 RX ADMIN — ACETAMINOPHEN 650 MG: 325 TABLET, FILM COATED ORAL at 21:39

## 2022-01-01 RX ADMIN — CEPHALEXIN 500 MG: 250 CAPSULE ORAL at 07:42

## 2022-01-01 RX ADMIN — TRAZODONE HYDROCHLORIDE 50 MG: 50 TABLET, FILM COATED ORAL at 20:41

## 2022-01-01 RX ADMIN — SODIUM CHLORIDE, PRESERVATIVE FREE 10 ML: 5 INJECTION INTRAVENOUS at 21:14

## 2022-01-01 RX ADMIN — COLLAGENASE SANTYL: 250 OINTMENT TOPICAL at 15:23

## 2022-01-01 RX ADMIN — MIDODRINE HYDROCHLORIDE 10 MG: 5 TABLET ORAL at 17:09

## 2022-01-01 RX ADMIN — Medication: at 20:52

## 2022-01-01 RX ADMIN — COLLAGENASE SANTYL: 250 OINTMENT TOPICAL at 17:33

## 2022-01-01 RX ADMIN — PANTOPRAZOLE SODIUM 8 MG/HR: 40 INJECTION, POWDER, LYOPHILIZED, FOR SOLUTION INTRAVENOUS at 14:29

## 2022-01-01 RX ADMIN — Medication 5 MG: at 23:53

## 2022-01-01 RX ADMIN — PANTOPRAZOLE SODIUM 40 MG: 40 TABLET, DELAYED RELEASE ORAL at 15:59

## 2022-01-01 RX ADMIN — ATORVASTATIN CALCIUM 80 MG: 80 TABLET, FILM COATED ORAL at 23:02

## 2022-01-01 RX ADMIN — Medication: at 22:08

## 2022-01-01 RX ADMIN — MIDODRINE HYDROCHLORIDE 10 MG: 5 TABLET ORAL at 08:22

## 2022-01-01 RX ADMIN — RIFABUTIN 300 MG: 150 CAPSULE ORAL at 21:06

## 2022-01-01 RX ADMIN — DORZOLAMIDE HYDROCHLORIDE 1 DROP: 20 SOLUTION/ DROPS OPHTHALMIC at 20:40

## 2022-01-01 RX ADMIN — EPINEPHRINE 1 MG: 0.1 INJECTION, SOLUTION ENDOTRACHEAL; INTRACARDIAC; INTRAVENOUS at 16:02

## 2022-01-01 RX ADMIN — HEPARIN SODIUM 5000 UNITS: 5000 INJECTION INTRAVENOUS; SUBCUTANEOUS at 14:16

## 2022-01-01 RX ADMIN — DORZOLAMIDE HYDROCHLORIDE 1 DROP: 20 SOLUTION/ DROPS OPHTHALMIC at 15:13

## 2022-01-01 RX ADMIN — DORZOLAMIDE HYDROCHLORIDE 1 DROP: 20 SOLUTION/ DROPS OPHTHALMIC at 13:35

## 2022-01-01 RX ADMIN — COLLAGENASE SANTYL: 250 OINTMENT TOPICAL at 21:00

## 2022-01-01 RX ADMIN — DORZOLAMIDE HYDROCHLORIDE 1 DROP: 20 SOLUTION/ DROPS OPHTHALMIC at 22:08

## 2022-01-01 RX ADMIN — TIMOLOL MALEATE 1 DROP: 2.5 SOLUTION OPHTHALMIC at 09:55

## 2022-01-01 RX ADMIN — MIDODRINE HYDROCHLORIDE 10 MG: 5 TABLET ORAL at 18:36

## 2022-01-01 RX ADMIN — RIFABUTIN 300 MG: 150 CAPSULE ORAL at 13:08

## 2022-01-01 RX ADMIN — COLLAGENASE SANTYL: 250 OINTMENT TOPICAL at 08:26

## 2022-01-01 RX ADMIN — PANTOPRAZOLE SODIUM 40 MG: 40 TABLET, DELAYED RELEASE ORAL at 05:41

## 2022-01-01 RX ADMIN — ALBUMIN (HUMAN) 25 G: 0.25 INJECTION, SOLUTION INTRAVENOUS at 16:19

## 2022-01-01 RX ADMIN — HEPARIN SODIUM 5000 UNITS: 5000 INJECTION INTRAVENOUS; SUBCUTANEOUS at 23:02

## 2022-01-01 RX ADMIN — DORZOLAMIDE HYDROCHLORIDE 1 DROP: 20 SOLUTION/ DROPS OPHTHALMIC at 18:40

## 2022-01-01 RX ADMIN — ATORVASTATIN CALCIUM 80 MG: 80 TABLET, FILM COATED ORAL at 20:39

## 2022-01-01 RX ADMIN — Medication: at 22:25

## 2022-01-01 RX ADMIN — FERROUS SULFATE TAB 325 MG (65 MG ELEMENTAL FE) 325 MG: 325 (65 FE) TAB at 07:42

## 2022-01-01 RX ADMIN — TIMOLOL MALEATE 1 DROP: 2.5 SOLUTION OPHTHALMIC at 07:45

## 2022-01-01 RX ADMIN — DORZOLAMIDE HYDROCHLORIDE 1 DROP: 20 SOLUTION/ DROPS OPHTHALMIC at 13:25

## 2022-01-01 RX ADMIN — SODIUM CHLORIDE, POTASSIUM CHLORIDE, SODIUM LACTATE AND CALCIUM CHLORIDE 500 ML: 600; 310; 30; 20 INJECTION, SOLUTION INTRAVENOUS at 10:02

## 2022-01-01 RX ADMIN — APIXABAN 2.5 MG: 5 TABLET, FILM COATED ORAL at 13:01

## 2022-01-01 RX ADMIN — PANTOPRAZOLE SODIUM 40 MG: 40 TABLET, DELAYED RELEASE ORAL at 05:55

## 2022-01-01 RX ADMIN — TRAZODONE HYDROCHLORIDE 50 MG: 50 TABLET, FILM COATED ORAL at 20:52

## 2022-01-01 RX ADMIN — MIDODRINE HYDROCHLORIDE 10 MG: 5 TABLET ORAL at 07:53

## 2022-01-01 RX ADMIN — ATORVASTATIN CALCIUM 80 MG: 80 TABLET, FILM COATED ORAL at 23:52

## 2022-01-01 RX ADMIN — CEPHALEXIN 500 MG: 250 CAPSULE ORAL at 13:01

## 2022-01-01 RX ADMIN — TIMOLOL MALEATE 1 DROP: 2.5 SOLUTION OPHTHALMIC at 10:37

## 2022-01-01 RX ADMIN — SODIUM CHLORIDE, PRESERVATIVE FREE 10 ML: 5 INJECTION INTRAVENOUS at 21:54

## 2022-01-01 RX ADMIN — RIFABUTIN 300 MG: 150 CAPSULE ORAL at 09:47

## 2022-01-01 RX ADMIN — FERROUS SULFATE TAB 325 MG (65 MG ELEMENTAL FE) 325 MG: 325 (65 FE) TAB at 08:24

## 2022-01-01 RX ADMIN — APIXABAN 2.5 MG: 5 TABLET, FILM COATED ORAL at 23:53

## 2022-01-01 RX ADMIN — RIFABUTIN 300 MG: 150 CAPSULE ORAL at 11:48

## 2022-01-01 RX ADMIN — RIFABUTIN 300 MG: 150 CAPSULE ORAL at 10:37

## 2022-01-01 RX ADMIN — SODIUM CHLORIDE: 9 INJECTION, SOLUTION INTRAVENOUS at 11:13

## 2022-01-01 RX ADMIN — EPOETIN ALFA-EPBX 10000 UNITS: 10000 INJECTION, SOLUTION INTRAVENOUS; SUBCUTANEOUS at 11:51

## 2022-01-01 RX ADMIN — ACETAMINOPHEN 650 MG: 325 TABLET, FILM COATED ORAL at 06:52

## 2022-01-01 RX ADMIN — PANTOPRAZOLE SODIUM 40 MG: 40 TABLET, DELAYED RELEASE ORAL at 18:25

## 2022-01-01 RX ADMIN — MIRTAZAPINE 15 MG: 15 TABLET, FILM COATED ORAL at 20:41

## 2022-01-01 RX ADMIN — ACETAMINOPHEN 650 MG: 325 TABLET, FILM COATED ORAL at 01:18

## 2022-01-01 RX ADMIN — TIMOLOL MALEATE 1 DROP: 2.5 SOLUTION OPHTHALMIC at 20:44

## 2022-01-01 RX ADMIN — PANTOPRAZOLE SODIUM 40 MG: 40 TABLET, DELAYED RELEASE ORAL at 16:13

## 2022-01-01 RX ADMIN — Medication: at 08:26

## 2022-01-01 RX ADMIN — FERROUS SULFATE TAB 325 MG (65 MG ELEMENTAL FE) 325 MG: 325 (65 FE) TAB at 10:36

## 2022-01-01 RX ADMIN — CALCIUM CHLORIDE 1000 MG: 100 INJECTION, SOLUTION INTRAVENOUS at 16:07

## 2022-01-01 RX ADMIN — RIFABUTIN 300 MG: 150 CAPSULE ORAL at 09:59

## 2022-01-01 RX ADMIN — SODIUM PHOSPHATE, MONOBASIC, MONOHYDRATE 10 MMOL: 276; 142 INJECTION, SOLUTION INTRAVENOUS at 13:09

## 2022-01-01 RX ADMIN — PANTOPRAZOLE SODIUM 40 MG: 40 TABLET, DELAYED RELEASE ORAL at 14:16

## 2022-01-01 RX ADMIN — SODIUM CHLORIDE, PRESERVATIVE FREE 10 ML: 5 INJECTION INTRAVENOUS at 08:18

## 2022-01-01 RX ADMIN — Medication: at 21:25

## 2022-01-01 RX ADMIN — MIDODRINE HYDROCHLORIDE 10 MG: 5 TABLET ORAL at 10:37

## 2022-01-01 RX ADMIN — TIMOLOL MALEATE 1 DROP: 2.5 SOLUTION OPHTHALMIC at 21:27

## 2022-01-01 RX ADMIN — DORZOLAMIDE HYDROCHLORIDE 1 DROP: 20 SOLUTION/ DROPS OPHTHALMIC at 18:25

## 2022-01-01 RX ADMIN — DORZOLAMIDE HYDROCHLORIDE 1 DROP: 20 SOLUTION/ DROPS OPHTHALMIC at 09:49

## 2022-01-01 RX ADMIN — MIDODRINE HYDROCHLORIDE 10 MG: 5 TABLET ORAL at 16:26

## 2022-01-01 RX ADMIN — NOREPINEPHRINE BITARTRATE 5 MCG/MIN: 1 SOLUTION INTRAVENOUS at 15:57

## 2022-01-01 RX ADMIN — HEPARIN SODIUM 5000 UNITS: 5000 INJECTION INTRAVENOUS; SUBCUTANEOUS at 22:52

## 2022-01-01 RX ADMIN — DORZOLAMIDE HYDROCHLORIDE 1 DROP: 20 SOLUTION/ DROPS OPHTHALMIC at 21:47

## 2022-01-01 RX ADMIN — DORZOLAMIDE HYDROCHLORIDE 1 DROP: 20 SOLUTION/ DROPS OPHTHALMIC at 21:24

## 2022-01-01 RX ADMIN — ACETAMINOPHEN 650 MG: 325 TABLET, FILM COATED ORAL at 05:01

## 2022-01-01 RX ADMIN — FERROUS SULFATE TAB 325 MG (65 MG ELEMENTAL FE) 325 MG: 325 (65 FE) TAB at 09:44

## 2022-01-01 RX ADMIN — DORZOLAMIDE HYDROCHLORIDE 1 DROP: 20 SOLUTION/ DROPS OPHTHALMIC at 21:09

## 2022-01-01 RX ADMIN — ALBUMIN (HUMAN) 25 G: 0.25 INJECTION, SOLUTION INTRAVENOUS at 10:53

## 2022-01-01 RX ADMIN — Medication: at 13:07

## 2022-01-01 RX ADMIN — DORZOLAMIDE HYDROCHLORIDE 1 DROP: 20 SOLUTION/ DROPS OPHTHALMIC at 23:35

## 2022-01-01 RX ADMIN — DORZOLAMIDE HYDROCHLORIDE 1 DROP: 20 SOLUTION/ DROPS OPHTHALMIC at 20:19

## 2022-01-01 RX ADMIN — HEPARIN SODIUM 5000 UNITS: 5000 INJECTION INTRAVENOUS; SUBCUTANEOUS at 05:56

## 2022-01-01 RX ADMIN — ATORVASTATIN CALCIUM 80 MG: 80 TABLET, FILM COATED ORAL at 23:07

## 2022-01-01 RX ADMIN — ACETAMINOPHEN 650 MG: 325 TABLET, FILM COATED ORAL at 17:25

## 2022-01-01 RX ADMIN — ALBUMIN (HUMAN) 25 G: 0.25 INJECTION, SOLUTION INTRAVENOUS at 10:17

## 2022-01-01 RX ADMIN — ACETAMINOPHEN 650 MG: 325 TABLET, FILM COATED ORAL at 12:54

## 2022-01-01 RX ADMIN — PANTOPRAZOLE SODIUM 40 MG: 40 TABLET, DELAYED RELEASE ORAL at 16:49

## 2022-01-01 RX ADMIN — PANTOPRAZOLE SODIUM 40 MG: 40 TABLET, DELAYED RELEASE ORAL at 09:55

## 2022-01-01 RX ADMIN — SODIUM CHLORIDE, PRESERVATIVE FREE 10 ML: 5 INJECTION INTRAVENOUS at 08:54

## 2022-01-01 RX ADMIN — TRAZODONE HYDROCHLORIDE 50 MG: 50 TABLET, FILM COATED ORAL at 23:07

## 2022-01-01 RX ADMIN — SODIUM CHLORIDE, PRESERVATIVE FREE 10 ML: 5 INJECTION INTRAVENOUS at 08:30

## 2022-01-01 RX ADMIN — ACETAMINOPHEN 650 MG: 325 TABLET, FILM COATED ORAL at 15:11

## 2022-01-01 RX ADMIN — TRAZODONE HYDROCHLORIDE 50 MG: 50 TABLET, FILM COATED ORAL at 21:22

## 2022-01-01 RX ADMIN — SODIUM CHLORIDE, PRESERVATIVE FREE 10 ML: 5 INJECTION INTRAVENOUS at 08:00

## 2022-01-01 RX ADMIN — CEPHALEXIN 500 MG: 250 CAPSULE ORAL at 11:47

## 2022-01-01 RX ADMIN — ATORVASTATIN CALCIUM 80 MG: 80 TABLET, FILM COATED ORAL at 20:42

## 2022-01-01 RX ADMIN — HEPARIN SODIUM 5000 UNITS: 5000 INJECTION INTRAVENOUS; SUBCUTANEOUS at 13:24

## 2022-01-01 RX ADMIN — Medication 7.5 G: at 23:07

## 2022-01-01 RX ADMIN — HEPARIN SODIUM 5000 UNITS: 5000 INJECTION INTRAVENOUS; SUBCUTANEOUS at 21:22

## 2022-01-01 RX ADMIN — DORZOLAMIDE HYDROCHLORIDE 1 DROP: 20 SOLUTION/ DROPS OPHTHALMIC at 08:33

## 2022-01-01 RX ADMIN — Medication: at 09:45

## 2022-01-01 RX ADMIN — SODIUM CHLORIDE, PRESERVATIVE FREE 10 ML: 5 INJECTION INTRAVENOUS at 20:43

## 2022-01-01 RX ADMIN — PANTOPRAZOLE SODIUM 8 MG/HR: 40 INJECTION, POWDER, LYOPHILIZED, FOR SOLUTION INTRAVENOUS at 20:43

## 2022-01-01 RX ADMIN — ACETAMINOPHEN 650 MG: 325 TABLET, FILM COATED ORAL at 06:23

## 2022-01-01 RX ADMIN — DORZOLAMIDE HYDROCHLORIDE 1 DROP: 20 SOLUTION/ DROPS OPHTHALMIC at 13:04

## 2022-01-01 RX ADMIN — CEPHALEXIN 500 MG: 250 CAPSULE ORAL at 08:19

## 2022-01-01 RX ADMIN — Medication: at 11:40

## 2022-01-01 RX ADMIN — APIXABAN 2.5 MG: 5 TABLET, FILM COATED ORAL at 20:42

## 2022-01-01 RX ADMIN — DORZOLAMIDE HYDROCHLORIDE 1 DROP: 20 SOLUTION/ DROPS OPHTHALMIC at 21:27

## 2022-01-01 RX ADMIN — DIBASIC SODIUM PHOSPHATE, MONOBASIC POTASSIUM PHOSPHATE AND MONOBASIC SODIUM PHOSPHATE 1 TABLET: 852; 155; 130 TABLET ORAL at 21:39

## 2022-01-01 RX ADMIN — TIMOLOL MALEATE 1 DROP: 2.5 SOLUTION OPHTHALMIC at 08:33

## 2022-01-01 RX ADMIN — COLLAGENASE SANTYL: 250 OINTMENT TOPICAL at 07:44

## 2022-01-01 RX ADMIN — MIDODRINE HYDROCHLORIDE 10 MG: 5 TABLET ORAL at 08:50

## 2022-01-01 RX ADMIN — HEPARIN SODIUM 5000 UNITS: 5000 INJECTION INTRAVENOUS; SUBCUTANEOUS at 21:54

## 2022-01-01 RX ADMIN — RIFABUTIN 300 MG: 150 CAPSULE ORAL at 08:25

## 2022-01-01 RX ADMIN — HEPARIN SODIUM 5000 UNITS: 5000 INJECTION INTRAVENOUS; SUBCUTANEOUS at 05:41

## 2022-01-01 RX ADMIN — Medication 5 MG: at 01:18

## 2022-01-01 RX ADMIN — SODIUM CHLORIDE, POTASSIUM CHLORIDE, SODIUM LACTATE AND CALCIUM CHLORIDE 500 ML: 600; 310; 30; 20 INJECTION, SOLUTION INTRAVENOUS at 10:27

## 2022-01-01 RX ADMIN — ACETAMINOPHEN 650 MG: 325 TABLET, FILM COATED ORAL at 23:21

## 2022-01-01 RX ADMIN — FERROUS SULFATE TAB 325 MG (65 MG ELEMENTAL FE) 325 MG: 325 (65 FE) TAB at 08:19

## 2022-01-01 RX ADMIN — TIMOLOL MALEATE 1 DROP: 2.5 SOLUTION OPHTHALMIC at 20:42

## 2022-01-01 RX ADMIN — TIMOLOL MALEATE 1 DROP: 2.5 SOLUTION OPHTHALMIC at 09:46

## 2022-01-01 RX ADMIN — DORZOLAMIDE HYDROCHLORIDE 1 DROP: 20 SOLUTION/ DROPS OPHTHALMIC at 10:32

## 2022-01-01 RX ADMIN — Medication: at 08:18

## 2022-01-01 RX ADMIN — ACETAMINOPHEN 650 MG: 325 TABLET, FILM COATED ORAL at 21:22

## 2022-01-01 RX ADMIN — TIMOLOL MALEATE 1 DROP: 2.5 SOLUTION OPHTHALMIC at 13:04

## 2022-01-01 RX ADMIN — HEPARIN SODIUM 5000 UNITS: 5000 INJECTION INTRAVENOUS; SUBCUTANEOUS at 21:40

## 2022-01-01 RX ADMIN — HEPARIN SODIUM 5000 UNITS: 5000 INJECTION INTRAVENOUS; SUBCUTANEOUS at 17:25

## 2022-01-01 RX ADMIN — PANTOPRAZOLE SODIUM 40 MG: 40 TABLET, DELAYED RELEASE ORAL at 17:25

## 2022-01-01 RX ADMIN — ATORVASTATIN CALCIUM 80 MG: 80 TABLET, FILM COATED ORAL at 21:26

## 2022-01-01 RX ADMIN — DORZOLAMIDE HYDROCHLORIDE 1 DROP: 20 SOLUTION/ DROPS OPHTHALMIC at 22:57

## 2022-01-01 RX ADMIN — COLLAGENASE SANTYL: 250 OINTMENT TOPICAL at 09:50

## 2022-01-01 RX ADMIN — APIXABAN 2.5 MG: 5 TABLET, FILM COATED ORAL at 21:06

## 2022-01-01 RX ADMIN — Medication: at 23:34

## 2022-01-01 RX ADMIN — SODIUM CHLORIDE: 9 INJECTION, SOLUTION INTRAVENOUS at 11:15

## 2022-01-01 RX ADMIN — TIMOLOL MALEATE 1 DROP: 2.5 SOLUTION OPHTHALMIC at 20:52

## 2022-01-01 RX ADMIN — Medication: at 20:19

## 2022-01-01 RX ADMIN — TRAZODONE HYDROCHLORIDE 50 MG: 50 TABLET, FILM COATED ORAL at 01:18

## 2022-01-01 RX ADMIN — TIMOLOL MALEATE 1 DROP: 2.5 SOLUTION OPHTHALMIC at 22:57

## 2022-01-01 RX ADMIN — DORZOLAMIDE HYDROCHLORIDE 1 DROP: 20 SOLUTION/ DROPS OPHTHALMIC at 14:30

## 2022-01-01 RX ADMIN — DORZOLAMIDE HYDROCHLORIDE 1 DROP: 20 SOLUTION/ DROPS OPHTHALMIC at 14:17

## 2022-01-01 RX ADMIN — PANTOPRAZOLE SODIUM 40 MG: 40 TABLET, DELAYED RELEASE ORAL at 18:11

## 2022-01-01 RX ADMIN — FERROUS SULFATE TAB 325 MG (65 MG ELEMENTAL FE) 325 MG: 325 (65 FE) TAB at 09:45

## 2022-01-01 ASSESSMENT — PAIN SCALES - PAIN ASSESSMENT IN ADVANCED DEMENTIA (PAINAD)
NEGVOCALIZATION: 0
CONSOLABILITY: 0
TOTALSCORE: 0
FACIALEXPRESSION: 0
BREATHING: 0
NEGVOCALIZATION: 0
BREATHING: 0
CONSOLABILITY: 0
CONSOLABILITY: 0
TOTALSCORE: 0
FACIALEXPRESSION: 0
TOTALSCORE: 0
BODYLANGUAGE: 0
FACIALEXPRESSION: 0
CONSOLABILITY: 0
CONSOLABILITY: 0
FACIALEXPRESSION: 0
BREATHING: 0
BREATHING: 0
NEGVOCALIZATION: 0
CONSOLABILITY: 0
TOTALSCORE: 0
BODYLANGUAGE: 0
BREATHING: 1
BODYLANGUAGE: 0
CONSOLABILITY: 1
FACIALEXPRESSION: 0
BODYLANGUAGE: 0
CONSOLABILITY: 0
NEGVOCALIZATION: 2
BODYLANGUAGE: 0
TOTALSCORE: 0
CONSOLABILITY: 0
CONSOLABILITY: 0
FACIALEXPRESSION: 0
BREATHING: 0
TOTALSCORE: 0
NEGVOCALIZATION: 0
TOTALSCORE: 0
FACIALEXPRESSION: 0
BODYLANGUAGE: 0
BODYLANGUAGE: 0
FACIALEXPRESSION: 0
CONSOLABILITY: 0
BREATHING: 0
NEGVOCALIZATION: 0
NEGVOCALIZATION: 0
TOTALSCORE: 6
BODYLANGUAGE: 0
TOTALSCORE: 0
FACIALEXPRESSION: 0
BREATHING: 0
BODYLANGUAGE: 0
CONSOLABILITY: 0
CONSOLABILITY: 0
NEGVOCALIZATION: 0
TOTALSCORE: 0
BREATHING: 0
BODYLANGUAGE: 0
FACIALEXPRESSION: 0
BREATHING: 0
NEGVOCALIZATION: 0
FACIALEXPRESSION: 0
FACIALEXPRESSION: 0
BREATHING: 0
NEGVOCALIZATION: 0
BREATHING: 0
BODYLANGUAGE: 0
NEGVOCALIZATION: 0
BODYLANGUAGE: 0
NEGVOCALIZATION: 0
TOTALSCORE: 0
TOTALSCORE: 0
BODYLANGUAGE: 0
CONSOLABILITY: 0
TOTALSCORE: 0
BODYLANGUAGE: 1
FACIALEXPRESSION: 0
NEGVOCALIZATION: 0
CONSOLABILITY: 0
BODYLANGUAGE: 0
BODYLANGUAGE: 0
BREATHING: 0
FACIALEXPRESSION: 0
TOTALSCORE: 0
BREATHING: 0
CONSOLABILITY: 0
BODYLANGUAGE: 0
TOTALSCORE: 0
NEGVOCALIZATION: 0
TOTALSCORE: 0
BODYLANGUAGE: 0
BREATHING: 0
CONSOLABILITY: 0
BREATHING: 0
CONSOLABILITY: 0
FACIALEXPRESSION: 0
NEGVOCALIZATION: 0
BODYLANGUAGE: 0
BODYLANGUAGE: 0
FACIALEXPRESSION: 0
NEGVOCALIZATION: 0
FACIALEXPRESSION: 0
CONSOLABILITY: 0
NEGVOCALIZATION: 0
BREATHING: 0
BREATHING: 0
FACIALEXPRESSION: 0
CONSOLABILITY: 0
BREATHING: 0
TOTALSCORE: 0
TOTALSCORE: 0
NEGVOCALIZATION: 0
NEGVOCALIZATION: 0
TOTALSCORE: 0
FACIALEXPRESSION: 0
BREATHING: 0
FACIALEXPRESSION: 1
CONSOLABILITY: 0
FACIALEXPRESSION: 0
BODYLANGUAGE: 0
CONSOLABILITY: 0
FACIALEXPRESSION: 0
NEGVOCALIZATION: 0
TOTALSCORE: 0
BREATHING: 0
BODYLANGUAGE: 0
BREATHING: 0

## 2022-01-01 ASSESSMENT — PAIN SCALES - GENERAL
PAINLEVEL_OUTOF10: 0
PAINLEVEL_OUTOF10: 4
PAINLEVEL_OUTOF10: 0
PAINLEVEL_OUTOF10: 3
PAINLEVEL_OUTOF10: 0
PAINLEVEL_OUTOF10: 6
PAINLEVEL_OUTOF10: 0
PAINLEVEL_OUTOF10: 8
PAINLEVEL_OUTOF10: 6
PAINLEVEL_OUTOF10: 0
PAINLEVEL_OUTOF10: 6
PAINLEVEL_OUTOF10: 3
PAINLEVEL_OUTOF10: 0
PAINLEVEL_OUTOF10: 5
PAINLEVEL_OUTOF10: 0
PAINLEVEL_OUTOF10: 5

## 2022-01-01 ASSESSMENT — PAIN DESCRIPTION - ORIENTATION
ORIENTATION: MID
ORIENTATION: MID
ORIENTATION: LEFT;RIGHT
ORIENTATION: RIGHT
ORIENTATION: RIGHT
ORIENTATION: MID

## 2022-01-01 ASSESSMENT — PAIN DESCRIPTION - DESCRIPTORS
DESCRIPTORS: OTHER (COMMENT)
DESCRIPTORS: ACHING
DESCRIPTORS: DULL
DESCRIPTORS: ACHING
DESCRIPTORS: ACHING

## 2022-01-01 ASSESSMENT — PAIN SCALES - WONG BAKER

## 2022-01-01 ASSESSMENT — PAIN DESCRIPTION - LOCATION
LOCATION: GENERALIZED
LOCATION: BACK;FOOT
LOCATION: ANKLE
LOCATION: BUTTOCKS
LOCATION: SACRUM
LOCATION: FOOT;ANKLE
LOCATION: BUTTOCKS
LOCATION: GENERALIZED
LOCATION: ANKLE

## 2022-01-01 ASSESSMENT — PAIN - FUNCTIONAL ASSESSMENT
PAIN_FUNCTIONAL_ASSESSMENT: NONE - DENIES PAIN
PAIN_FUNCTIONAL_ASSESSMENT: PREVENTS OR INTERFERES SOME ACTIVE ACTIVITIES AND ADLS
PAIN_FUNCTIONAL_ASSESSMENT: 0-10
PAIN_FUNCTIONAL_ASSESSMENT: ACTIVITIES ARE NOT PREVENTED

## 2022-10-11 PROBLEM — N18.6 ESRD NEEDING DIALYSIS (HCC): Status: ACTIVE | Noted: 2022-10-11

## 2022-10-11 PROBLEM — Z99.2 ESRD NEEDING DIALYSIS (HCC): Status: ACTIVE | Noted: 2022-01-01

## 2022-10-11 NOTE — ED NOTES
Report called to Triston Sandoval RN on 6S. Pt is going to dialysis first, then will go to the room.       Soo Acuña RN  10/11/22 4763

## 2022-10-11 NOTE — ED PROVIDER NOTES
[id number] [facility] RESIDENT NOTE       Date of evaluation: 10/11/2022    Chief Complaint     Procedure (Via EMS from home, with c/o needing dialysis, states he goes on t, th, sat. States his first dialysis treatment was this past Saturday at [facility]. States he was not able to go there today to receive treatment. Pt denies cp, sob, fevers, chills. )      of Present Illness     Angela Dominguez is a 82 y.o. male who presents with need for dialysis. Just recently started dialysis. Was discharged on 10/8 from [facility] following an admission for dialysis. Patient is supposed to follow-up with [place] with [facility] nephrology following and for his dialysis care. He was due for dialysis today. However, he has difficulty accessing [facility] due to transportation and social work issues. Wife is at bedside and states that they have been in contact with [facility] and there are no issues with him going there, but they do not have transportation set up and prior social work documentation and paperwork has been incorrect. He is supposed to receive Ancef with every dialysis (2 g Tuesday Thursday and 3 g on Saturday with an end date of 10/12). From 10/12 he is supposed to be on Keflex 500 mg every 24 hours for life. Denies chest pain, difficulty breathing, abdominal pain, nausea, vomiting, new weakness, change in sensation or pain. Patient endorses making urine.     Review of Systems   Complete review of systems is documented and is negative except as described in the HPI    Past Medical, Surgical, Family, and Social History   PMHx: A. fib, aortic stenosis, CKD, ESRD, HFrEF (EF 45%), HLD, hypertension, STEMI, tachybradycardia syndrome s/p pacemaker    No significant past surgical history    Medications     Current Discharge Medication List        CONTINUE these medications which have NOT CHANGED    Details   apixaban (ELIQUIS) 2.5 MG TABS tablet Take 2.5 mg by mouth 2 times daily      atorvastatin (LIPITOR) 80 MG tablet atorvastatin 80 mg tablet      midodrine (PROAMATINE) 10 MG tablet Take 10 mg by mouth 3 times daily as needed      pantoprazole (PROTONIX) 40 MG tablet pantoprazole 40 mg tablet,delayed release      polyethylene glycol (GLYCOLAX) 17 GM/SCOOP powder Take 17 g by mouth daily      traZODone (DESYREL) 50 MG tablet Take 50 mg by mouth nightly as needed      rifabutin (MYCOBUTIN) 150 MG capsule Take 300 mg by mouth daily      cephALEXin (KEFLEX) 500 MG capsule Take 500 mg by mouth every 24 hours To start 10/12      dorzolamide (TRUSOPT) 2 % ophthalmic solution INSTILL 1 DROP INTO LEFT EYE TWICE DAILY      ferrous sulfate (IRON 325) 325 (65 Fe) MG tablet Take 325 mg by mouth daily (with breakfast)      timolol (TIMOPTIC) 0.5 % ophthalmic solution INSTILL 1 DROP INTO LEFT EYE TWICE DAILY      allopurinol (ZYLOPRIM) 300 MG tablet Take 300 mg by mouth daily      Multiple Vitamins-Minerals (THERAPEUTIC MULTIVITAMIN-MINERALS) tablet Take 1 tablet by mouth daily             Allergies     He has No Known Allergies. Physical Exam     INITIAL VITALS: BP: (!) 133/49, Temp: 97.7 °F (36.5 °C), Heart Rate: (!) 101, Resp: 18, SpO2: 100 %   Physical Exam  Vitals and nursing note reviewed. Constitutional:       Appearance: Normal appearance. He is normal weight. He is not toxic-appearing. HENT:      Head: Normocephalic and atraumatic. Nose: Nose normal.      Mouth/Throat:      Mouth: Mucous membranes are moist.      Pharynx: Oropharynx is clear. Eyes:      Extraocular Movements: Extraocular movements intact. Pupils: Pupils are equal, round, and reactive to light. Cardiovascular:      Rate and Rhythm: Normal rate and regular rhythm. Pulses: Normal pulses. Heart sounds: Murmur (systolic murmur) heard. Pulmonary:      Effort: Pulmonary effort is normal.      Breath sounds: Normal breath sounds.       Comments: Mild bibasilar crackles  Abdominal:      General: Abdomen is flat. Palpations: Abdomen is soft. Musculoskeletal:      Right lower leg: Edema present. Left lower leg: Edema present. Skin:     General: Skin is warm and dry. Capillary Refill: Capillary refill takes less than 2 seconds. Neurological:      General: No focal deficit present. Mental Status: He is alert and oriented to person, place, and time. Psychiatric:         Mood and Affect: Mood normal.         Behavior: Behavior normal.       DiagnosticResults     EKG   Interpreted in conjunction with emergencydepartment physician No att. providers found  Rhythm:  Intermittently ventricullarly paced  Rate: tachycardia  Axis: Normal  Ectopy: none  Conduction: nonspecific interventricular conduction block  ST Segments: Nonspecific ST changes  T Waves:non specific changes  Q Waves: nonspecific  Clinical Impression: non-specific EKG with significant baseline artifact makes it difficult to interpret  Comparison: Changes compared to prior on 3/7/2018 that are nonspecific no acute findings of ischemia    RADIOLOGY:  XR CHEST PORTABLE   Final Result      Mild perihilar opacity with pulmonary vascular indistinctness may represent mild pulmonary edema. Bandlike opacity in the right midlung may represent atelectasis. Cardiomegaly.           LABS:   Results for orders placed or performed during the hospital encounter of 10/11/22   BMP w/ Reflex to MG   Result Value Ref Range    Sodium 138 136 - 145 mmol/L    Potassium reflex Magnesium 4.3 3.5 - 5.1 mmol/L    Chloride 99 99 - 110 mmol/L    CO2 27 21 - 32 mmol/L    Anion Gap 12 3 - 16    Glucose 89 70 - 99 mg/dL    BUN 34 (H) 7 - 20 mg/dL    Creatinine 2.6 (H) 0.8 - 1.3 mg/dL    GFR Non-African American 24 (A) >60    GFR  28 (A) >60    Calcium 9.1 8.3 - 10.6 mg/dL   CBC with Auto Differential   Result Value Ref Range    WBC 9.5 4.0 - 11.0 K/uL    RBC 3.78 (L) 4.20 - 5.90 M/uL    Hemoglobin 10.2 (L) 13.5 - 17.5 g/dL    Hematocrit 33.3 (L) 40.5 - 52.5 %    MCV 88.2 80.0 - 100.0 fL    MCH 27.1 26.0 - 34.0 pg    MCHC 30.8 (L) 31.0 - 36.0 g/dL    RDW 22.7 (H) 12.4 - 15.4 %    Platelets 813 693 - 510 K/uL    MPV 8.0 5.0 - 10.5 fL    Neutrophils % 76.4 %    Lymphocytes % 14.0 %    Monocytes % 8.4 %    Eosinophils % 0.8 %    Basophils % 0.4 %    Neutrophils Absolute 7.2 1.7 - 7.7 K/uL    Lymphocytes Absolute 1.3 1.0 - 5.1 K/uL    Monocytes Absolute 0.8 0.0 - 1.3 K/uL    Eosinophils Absolute 0.1 0.0 - 0.6 K/uL    Basophils Absolute 0.0 0.0 - 0.2 K/uL   EKG 12 Lead   Result Value Ref Range    Ventricular Rate 105 BPM    Atrial Rate 357 BPM    QRS Duration 106 ms    Q-T Interval 376 ms    QTc Calculation (Bazett) 496 ms    R Axis -15 degrees    T Axis 163 degrees    Diagnosis       EKG performed in ER and to be interpreted by ER physician. Confirmed by MD, ER ([id number]),  Juan Mercado ([id number]) on 10/11/2022 1:50:56 PM       ED BEDSIDE ULTRASOUND:  No results found. RECENT VITALS:  BP: (!) 99/42, Temp: 97.7 °F (36.5 °C), Heart Rate: 98,Resp: 16, SpO2: 99 %     Procedures     N/a    ED Course     Nursing Notes, Past Medical Hx, Past Surgical Hx, Social Hx, Allergies, and Family Hx were reviewed.          The patient was given the followingmedications:  Orders Placed This Encounter   Medications    ceFAZolin (ANCEF) 2,000 mg in sodium chloride 0.9 % 50 mL IVPB (mini-bag)     Order Specific Question:   Antimicrobial Indications     Answer:   Bloodstream Infection    cephALEXin (KEFLEX) capsule 500 mg     Order Specific Question:   Antimicrobial Indications     Answer:   Bloodstream Infection    apixaban (ELIQUIS) tablet 2.5 mg     Order Specific Question:   Indication of Use     Answer:   A Fib/A Flutter    midodrine (PROAMATINE) tablet 10 mg    pantoprazole (PROTONIX) tablet 40 mg    polyethylene glycol (GLYCOLAX) packet 17 g    ferrous sulfate (IRON 325) tablet 325 mg    timolol (TIMOPTIC) 0.25 % ophthalmic solution 1 drop    dorzolamide (TRUSOPT) 2 % ophthalmic solution 1 drop    traZODone (DESYREL) tablet 50 mg    aspirin chewable tablet 81 mg    atorvastatin (LIPITOR) tablet 80 mg       CONSULTS:  IP CONSULT TO NEPHROLOGY  IP CONSULT TO HOSPITALIST    MEDICAL DECISION MAKING / ASSESSMENT / Ky Clif is a 82 y.o. male with a history of ESRD requiring dialysis presents with an asymptomatic need for dialysis and physical exam findings consistent with mild fluid overload. Work-up significant for elevated BUN and creatinine with no significant electrolyte abnormalities. Given fluid overloaded state and need for dialysis, nephrology was consulted. Patient will be admitted to the hospitalist service with nephrology consulting for further management and care. This patient was also evaluated by the attending physician. All care plans werediscussed and agreed upon. Clinical Impression     1. ESRD (end stage renal disease) on dialysis [facility])        Disposition     PATIENT REFERRED TO:  No follow-up provider specified.     DISCHARGE MEDICATIONS:  Current Discharge Medication List          DISPOSITION Admitted 10/11/2022 02:48:41 PM       Edna Cerrato MD  Resident  10/11/22 3568

## 2022-10-11 NOTE — PROGRESS NOTES
10/11/22 1623   Encounter Summary   Encounter Overview/Reason  Initial Encounter  (Reviewed chart. Talked to charge nurse.)   Service Provided For: Patient;Significant other   Referral/Consult From: Rounding   Support System Spouse; Family members; Zoroastrianism/jag community   Complexity of Encounter Moderate   Begin Time 1607   End Time  1615   Total Time Calculated 8 min   Encounter    Type Initial Screen/Assessment   Spiritual/Emotional needs   Type Spiritual Support   Assessment/Intervention/Outcome   Assessment Calm; Hopeful   Intervention Active listening;Discussed belief system/Roman Catholic practices/jag;Nurtured Hope;Prayer (assurance of)/[place]   Outcome Comfort;Engaged in conversation;Expressed Gratitude;Receptive

## 2022-10-11 NOTE — CARE COORDINATION
NKIIA met with Mr. Jose Rafael Freitas, his spouse, and daughter at the bedside in the emergency department. Mr. Jose Rafael Freitas was sitting up on the stretcher. He is alert and oriented x 4. He stated he feels weak and defers to his family to answer questions. He was admitted to [facility] 8/16-10/5/2022. When he was discharged, he needed new hemodialysis. [facility] arranged outpatient HD @ [facility] TTS 1230. Transportation has been a barrier. Stretcher transport was arranged. However, when the crew arrived at the home, they were not able to fit the stretcher into the room where the pt's bed was located. The family used a Linda lift to transfer the pt to a wheelchair. When they wheeled him to the stretcher, the crew proceeded to tell the family he did not meet criteria for stretcher transport if able to get around in a wheelchair. The family called EMS to take the pt to [facility] for HD 10/6. He was discharged home on 10/8. He still did not have a plan for transportation to take him to any further dialysis appointments. He is in the emergency department today for HD. The family is understandably frustrated with multiple hospital visits and no concrete and safe plan of care. NIKIA placed a call to the , Shereen Claude, with [place] [phone]. She confirmed the barrier has been transportation. Shereen Claude stated she has called multiple transportation companies. Mr. Jose Rafael Freitas does not have a wheelchair transport benefit through his insurance. The family has also stated he cannot sit in a chair for an extended amount of time due to weakness and pain from wounds on his sacral area. NIKIA discussed options for skilled nursing facilities where Mr. Jose Rafael Freitas could receive on-site HD. He is agreeable to SNF placement. A referral was called to [address] [place] [address]. Unfortunately, there are no HD beds available. A referral was also called to [facility].  Left a message for Sapphire Leone in admissions and sent via [facility]. Electronically signed by Justina Arthur, ARGELIA RN-[facility] ACM-RN  Case Management  [phone]    Addendum 2242  NIKIA received a call from Danica Nazario at [facility]. She is going to pull the referral from [facility]. N HD bed may be available soon. The facility will need a 1-time contract with [facility].

## 2022-10-11 NOTE — CONSULTS
Nephrology Consult Note        [facility]    [email or web address]       Phone: [phone]                                                  10/11/2022 1:28 PM     Patient: Tamiko Reyes [id number]  [id number]  Date of Admit: 10/11/2022 LOS: 0 days      Plan:  HD today with UF as tolerated  Monitor BP   Avoid nephrotoxins  Monitor input, output and weight  Monitor labs and vitals closely  Renally dose all medications    Dialysis orders placed and informed dialysis nurse  SW to assist in transportation for D/C planning. D/W patient, his wife and ER team.       Thank you for allowing us to participate in this patient's care    In case of any question please call us at our 24 hour answering service [phone] or from 7 AM to 5 PM via [employer] or cell number    Silvia Perdomo MD  [facility]  [address], [address]  Fax: [phone]  Office: [phone]       Assessment & Plan     Renal function:  Acute Kidney Injury and started on Hemodialysis and discharged on 10/5 from [address]: [facility]. No concern for infection. 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 Lab Results   Component Value Date    CALCIUM 9.1 10/11/2022         Acid/Base status:  Stable. Volume status/BP:  Hypertension: BP soft in 110 - 120 range. Patient has edema but no SOB at rest on room air.      Hematology:   CKD related anemia  Goal Hgb 10-11    No results found for: IRON, TIBC, FERRITIN  Lab Results   Component Value Date    WBC 9.5 10/11/2022    HGB 10.2 (L) 10/11/2022    HCT 33.3 (L) 10/11/2022    MCV 88.2 10/11/2022     10/11/2022             Reason for Consult and Chief Complaint     Reason for consult: HENRY on HD    Chief complaint:   Chief Complaint   Patient presents with    Procedure     Via EMS from home, with c/o needing dialysis, states he goes on t, th, sat. States his first dialysis treatment was this past Saturday at [facility]. States he was not able to go there today to receive treatment. Pt denies cp, sob, fevers, chills. History of Present Illness   Marilia Alvarenga is a 82 y.o. with PMH significant for HFrEF (40%), STEMI on 6/2022, s/p pacemaker 7/2022 2/2 Tachybrady syndrome, Afib on Eliquis, Aortic Stenosis, HLD, HTN, HENRY on Hemodialysis was discharged from [address] on 10/5 but due to transportation issues patient was not able to go to outpatient dialysis clinic and he was brought to ER by family for dialysis. Patient is accompanied by her wife. Patient is comfortable on room air and denied SOB, chest pain, fever or cough. Review of Systems   Positive in bold or unable to assess     GEN: Fever, chills, night sweats. HEENT: Changes in vision, sore throat, rhinorrhea   CVS: Chest pain, palpitations, swelling or edema in legs  Pulmonary: Cough, hemoptysis, SOB  GI: Nausea, vomiting, diarrhea, constipation, abdominal pain  : Bladder incontinence, dysuria,hematuria. MSK: Muscle or joint or bone pains  Skin: Rashes, ulcers, skin thickness  CNS: Headache, dizziness, confusion, focal weakness, seizure. Psych: Anxiety, agitation, depression. Reviewed all 12 systems, negative except as above. 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 No pertinent surgical history.       Family History     History reviewed. No pertinent family history. Social History     Social History     Tobacco Use    Smoking status: Never    Smokeless tobacco: Never   Substance Use Topics    Alcohol use: Not Currently          Past medical, family, and social histories were reviewed as previously documented. Updates were made as necessary. Inpatient Medications and Allergies       Scheduled Meds:    Allergies: No Known Allergies      Vital Signs     Vitals:    10/11/22 1300   BP: 123/75   Pulse: (!) 112   Resp: 19   Temp:    SpO2: 100%       No intake or output data in the 24 hours ending 10/11/22 1328      Physical Exam     General appearance: NAD  Head: Normocephalic, without obvious abnormality, atraumatic   Mouth: Has mask. Neck: Supple. Lungs: Good air entry bilaterally. No respiratory distress on room air. Heart: S1, S2.  Abdomen: soft, non-tender non-distended  Extremities: + leg edema, warm to touch   Skin: No concerning rashes noted  Psych: good eye contact, normal affect  Neuro: AAO x 3  TDC in place.        Laboratory Data     Please see above     Diagnostic Studies   Pertinent images reviewed

## 2022-10-11 NOTE — H&P
Hospital Medicine History & Physical      PCP: Jazmin Rodriguez MD    Date of Admission: 10/11/2022    Date of Service: Pt seen/examined on 10/11/22 and Admitted to Inpatient with expected LOS greater than two midnights due to medical therapy. Chief Complaint:  ESRD needing dialysis, volume overload    History Of Present Illness:      Sangeeta Dunham is a 82 y.o. male with past medical history as below, including STEMI 6./2022, tachy-federico syndrome s/p PPM 7/2022, Afib on Eliquis, systolic CHF EF 42-03%, aortic stenosis, recent MSSA bacteremia on Ancef, who presents today with inability to get to dialysis. He has a chair time but it appears his transportation for dialysis was not set-up. Aside from this he has not developed any new symptoms since recent discharge from . At that admission he also need dialysis due to social work issues. He refused explantation of his PPM and has been on Ancef with planned switch to Keflex tomorrow for chronic suppression. Past Medical History:      ESRD  STEMI  AS  Systolic CHF  MSSA Bacteremia  Stage III sacral wound  Afib on Eliquis    Past Surgical History:      History reviewed. No pertinent surgical history. Medications Prior to Admission:      Prior to Admission medications    Medication Sig Start Date End Date Taking?  Authorizing Provider   allopurinol (ZYLOPRIM) 300 MG tablet Take 300 mg by mouth daily 12/5/17   Historical Provider, MD   amLODIPine (NORVASC) 10 MG tablet Take 10 mg by mouth daily 12/5/17   Historical Provider, MD   furosemide (LASIX) 20 MG tablet Take 20 mg by mouth daily 12/5/17   Historical Provider, MD   losartan (COZAAR) 50 MG tablet Take 50 mg by mouth daily 12/5/17   Historical Provider, MD   simvastatin (ZOCOR) 10 MG tablet Take 10 mg by mouth nightly 12/5/17   Historical Provider, MD   AZOPT 1 % ophthalmic suspension Place 1-2 drops into the left eye 2 times daily Two drops in AM, 1 drop in PM 2/19/18   Historical Provider, MD Multiple Vitamins-Minerals (THERAPEUTIC MULTIVITAMIN-MINERALS) tablet Take 1 tablet by mouth daily    Historical Provider, MD   aspirin 81 MG chewable tablet Take 81 mg by mouth daily    Historical Provider, MD       Allergies:  Patient has no known allergies. Social History:      The patient currently lives in private residence. TOBACCO:   reports that he has never smoked. He has never used smokeless tobacco.  ETOH:   reports that he does not currently use alcohol. Family History:      Reviewed in detail and positive as follows:    History reviewed. No pertinent family history. REVIEW OF SYSTEMS:   Pertinent positives as noted in the HPI. All other systems reviewed and negative. PHYSICAL EXAM:    BP (!) 95/57   Pulse 100   Temp 97.7 °F (36.5 °C) (Oral)   Resp 17   SpO2 97%     General appearance: No apparent distress, appears stated age and cooperative. HEENT: L eye glaucoma. R eye removal.   Neck: Supple, with full range of motion. No jugular venous distention. Trachea midline. Respiratory:  Normal respiratory effort. Clear to auscultation, bilaterally without Rales/Wheezes/Rhonchi. Cardiovascular: Regular rate and rhythm with normal S1/S2 without murmurs, rubs or gallops. Abdomen: Soft, non-tender, non-distended with normal bowel sounds. Musculoskelatal: No clubbing, cyanosis or edema bilaterally. Full range of motion without deformity. Skin: Skin color, texture, turgor normal.  No rashes or lesions. Neurologic:  Neurovascularly intact without any focal sensory/motor deficits. Cranial nerves: II-XII intact, grossly non-focal. Generally weak throughout.   Psychiatric: Alert and oriented, thought content appropriate, normal insight    Labs:     Recent Labs     10/11/22  1120   WBC 9.5   HGB 10.2*   HCT 33.3*        Recent Labs     10/11/22  1120      K 4.3   CL 99   CO2 27   BUN 34*   CREATININE 2.6*   CALCIUM 9.1     No results for input(s): AST, ALT, BILIDIR, BILITOT, ALKPHOS in the last 72 hours. No results for input(s): INR in the last 72 hours. No results for input(s): [place] Ill in the last 72 hours. Urinalysis:    No results found for: NITRU, WBCUA, BACTERIA, RBCUA, BLOODU, SPECGRAV, GLUCOSEU    Studies:  XR CHEST PORTABLE   Final Result      Mild perihilar opacity with pulmonary vascular indistinctness may represent mild pulmonary edema. Bandlike opacity in the right midlung may represent atelectasis. Cardiomegaly. ASSESSMENT:    ESRD needing dialysis    PLAN:    ESRD needing dialysis  Fluid overload  Nephrology consulted  Social work consult    Tachy federico syndrome s/p PPM s/p PPM  Recent STEMI  ASA, statin, Eliquis 2.5 mg BID    Hx of MSSA bacteremia, endocarditis  Refused explant  Ancef 2 g today  Keflex tomorrow  Rifabutin - will check with pharmacy if available, may need to request from home    Stage 3 wound on buttocks  Wound care consult    DVT Prophylaxis: on Eliquis  Diet: No diet orders on file  Code Status: No Order    PT/OT Eval Status: pending    Dispo - Inpatient      Denisha Saunders DO     Thank you Sveta Lewis MD for the opportunity to be involved in this patient's care. If you have any questions or concerns please feel free to contact [facility] at [facility].

## 2022-10-11 NOTE — ED PROVIDER NOTES
ED Attending Attestation Note     Date of evaluation: 10/11/2022    This patient was seen by the resident. I have seen and examined the patient, agree with the workup, evaluation, management and diagnosis. The care plan has been discussed. I have reviewed the ECG and concur with the resident's interpretation. My assessment reveals patient is alert, asymptomatic at this time, no signs of respiratory distress.      Moose Carter MD  10/11/22 7018

## 2022-10-12 NOTE — PROGRESS NOTES
4 Eyes Admission Assessment     I agree as the admission nurse that 2 RN's have performed a thorough Head to Toe Skin Assessment on the patient. ALL assessment sites listed below have been assessed on admission. Areas assessed by both nurses: Ayleen Edelson  [x]   Head, Face, and Ears   [x]   Shoulders, Back, and Chest  [x]   Arms, Elbows, and Hands   [x]   Coccyx, Sacrum, and Ischium  [x]   Legs, Feet, and Heels        Does the Patient have Skin Breakdown?   Yes a wound was noted on the Admission Assessment and an LDA was Initiated documentation include the Jayde-wound, Wound Assessment, Measurements, Dressing Treatment, Drainage, and Color\",         Suraj Prevention initiated:  Yes   Wound Care Orders initiated:  Yes      [id number]  nurse consulted for Pressure Injury (Stage 3,4, Unstageable, DTI, NWPT, and Complex wounds) or Suraj score 18 or lower:  Yes      Nurse 1 eSignature: Electronically signed by Radha Starr RN on 10/12/22 at 1:56 AM EDT    **SHARE this note so that the co-signing nurse is able to place an eSignature**    Nurse 2 eSignature: Electronically signed by Kye Amador RN on 10/14/22 at 3:12 AM EDT

## 2022-10-12 NOTE — CARE COORDINATION
Florencia Cameron, at [facility] and they are awaiting word for bed (bed not available today). Luis Ambriz will call with update today if she finds out. Shelby President are working on a one time contract deal  for this patient. She has this CM's phone to call when she has info. CM will continue to follow patient until discharge. Electronically signed by Myrna Ansari RN on 10/12/2022 at 1:38 PM    Addendum:   Luis Ambriz called this CM and stated that they still have no bed available at [facility] and do not foresee a bed opening for another week at least, so therefore, would not approach insurance to verify a stay at their facility. Their facility will not send anyone by stretcher to HD because a transport issue as a rule, now. We would have to secure a stretcher transport bed on our end at the hospital. This CM looking for another SNF. Electronically signed by Myrna Ansari RN on 10/12/2022 at 2:34 PM    Addendum:    This CM sent referrals to  the following SNFs that have inpatient dialysis services and informed them that this patient is micky lift and need stretcher transport to any OP HD facility :   [place] [facility] [phone] [facility]    Electronically signed by Myrna Ansari RN on 10/12/2022 at 3:21 PM

## 2022-10-12 NOTE — CONSULTS
[address] [place] [address] [id number] Continence Nurse  Consult Note       NAME:  Yulissa Cross  MEDICAL RECORD NUMBER:  [id number]  AGE: 82 y.o. GENDER: male  : 1935  TODAY'S DATE:  10/12/2022    Subjective   Reason for WOCN Evaluation and Assessment: L Upper Buttock - Unstageable  L Buttock - Stage 2  R Heel - Stage 2      Yulissa Cross is a 82 y.o. male referred by:   [] Physician  [x] Nursing  [] Other:     Wound Identification:  Wound Type: pressure  Contributing Factors: chronic pressure, decreased mobility, shear force, obesity, incontinence of stool, and incontinence of urine    Wound History: Yulissa Cross is a 82 y.o. male with past medical history as below, including STEMI , tachy-federico syndrome s/p PPM 2022, Afib on Eliquis, systolic CHF EF 55-06%, aortic stenosis, recent MSSA bacteremia on Ancef, who presents today with inability to get to dialysis. He has a chair time but it appears his transportation for dialysis was not set-up. Aside from this he has not developed any new symptoms since recent discharge from . At that admission he also need dialysis due to social work issues. He refused explantation of his PPM and has been on Ancef with planned switch to Keflex tomorrow for chronic suppression. Current Wound Care Treatment:  L Upper Buttock - Santyl  L Buttock - Venelex  R Heel - alginate ag, foam    Patient Goal of Care:  [x] Wound Healing  [] Odor Control  [] Palliative Care  [] Pain Control   [] Other:         PAST MEDICAL HISTORY    History reviewed. No pertinent past medical history. PAST SURGICAL HISTORY    History reviewed. No pertinent surgical history. FAMILY HISTORY    History reviewed. No pertinent family history.     SOCIAL HISTORY    Social History     Tobacco Use    Smoking status: Never    Smokeless tobacco: Never   Vaping Use    Vaping Use: Never used   Substance Use Topics    Alcohol use: Not Currently    Drug use: Never       ALLERGIES    No Known Allergies    MEDICATIONS    No current facility-administered medications on file prior to encounter.      Current Outpatient Medications on File Prior to Encounter   Medication Sig Dispense Refill    apixaban (ELIQUIS) 2.5 MG TABS tablet Take 2.5 mg by mouth 2 times daily      atorvastatin (LIPITOR) 80 MG tablet atorvastatin 80 mg tablet      midodrine (PROAMATINE) 10 MG tablet Take 10 mg by mouth 3 times daily as needed      pantoprazole (PROTONIX) 40 MG tablet pantoprazole 40 mg tablet,delayed release      polyethylene glycol (GLYCOLAX) 17 GM/SCOOP powder Take 17 g by mouth daily      traZODone (DESYREL) 50 MG tablet Take 50 mg by mouth nightly as needed      rifabutin (MYCOBUTIN) 150 MG capsule Take 300 mg by mouth daily      cephALEXin (KEFLEX) 500 MG capsule Take 500 mg by mouth every 24 hours To start 10/12      dorzolamide (TRUSOPT) 2 % ophthalmic solution INSTILL 1 DROP INTO LEFT EYE TWICE DAILY      ferrous sulfate (IRON 325) 325 (65 Fe) MG tablet Take 325 mg by mouth daily (with breakfast)      timolol (TIMOPTIC) 0.5 % ophthalmic solution INSTILL 1 DROP INTO LEFT EYE TWICE DAILY      allopurinol (ZYLOPRIM) 300 MG tablet Take 300 mg by mouth daily (Patient not taking: Reported on 10/11/2022)      Multiple Vitamins-Minerals (THERAPEUTIC MULTIVITAMIN-MINERALS) tablet Take 1 tablet by mouth daily         Objective    BP (!) 129/54   Pulse (!) 107   Temp 98.5 °F (36.9 °C) (Oral)   Resp 16   Ht 5' 9\" (1.753 m)   Wt 205 lb 4 oz (93.1 kg)   SpO2 98%   BMI 30.31 kg/m²     LABS:  WBC:    Lab Results   Component Value Date/Time    WBC 9.5 10/11/2022 11:20 AM     H/H:    Lab Results   Component Value Date/Time    HGB 10.2 10/11/2022 11:20 AM    HCT 33.3 10/11/2022 11:20 AM     PTT:  No results found for: APTT, PTT[APTT}  PT/INR:  No results found for: PROTIME, INR  HgBA1c:  No results found for: LABA1C    Assessment: R Heel - small open area with red wound bed with scant slough  L Upper Buttock - wound is covered with yellow slough  L Buttocks - scattered open areas with red wound bed   Suraj Risk Score: Suraj Scale Score: 12    Patient Active Problem List   Diagnosis Code    ESRD needing dialysis ([facility] [address] N18.6, Z99.2       Measurements:  Wound 10/12/22 Heel Right (Active)   Wound Image   10/12/22 1312   Wound Etiology Pressure Stage 2 10/12/22 1312   Dressing Status New dressing applied 10/12/22 1312   Wound Cleansed Not Cleansed 10/12/22 1312   Dressing/Treatment Alginate with Ag; Foam 10/12/22 1312   Offloading for Diabetic Foot Ulcers Offloading boot 10/12/22 1312   Dressing Change Due 10/13/22 10/12/22 1312   Wound Length (cm) 2 cm 10/12/22 1312   Wound Width (cm) 1.5 cm 10/12/22 1312   Wound Depth (cm) 0.3 cm 10/12/22 1312   Wound Surface Area (cm^2) 3 cm^2 10/12/22 1312   Wound Volume (cm^3) 0.9 cm^3 10/12/22 1312   Wound Assessment Pink/red;Slough 10/12/22 1312   Drainage Amount Scant 10/12/22 1312   Drainage Description Serosanguinous 10/12/22 1312   Odor None 10/12/22 1312   Jayde-wound Assessment Dry/flaky; Intact 10/12/22 1312   Margins Attached edges; Defined edges 10/12/22 1312   Number of days: 0    R Heel:       Wound 10/12/22 Buttocks Left;Upper (Active)   Wound Image   10/12/22 1312   Wound Etiology Pressure Unstageable 10/12/22 1312   Dressing Status New drainage noted 10/12/22 1312   Wound Cleansed Not Cleansed 10/12/22 1312   Dressing/Treatment Pharmaceutical agent (see MAR); Foam 10/12/22 1312   Dressing Change Due 10/12/22 10/12/22 1312   Wound Length (cm) 5.6 cm 10/12/22 1312   Wound Width (cm) 3.5 cm 10/12/22 1312   Wound Depth (cm) 0.2 cm 10/12/22 1312   Wound Surface Area (cm^2) 19.6 cm^2 10/12/22 1312   Wound Volume (cm^3) 3.92 cm^3 10/12/22 1312   Wound Assessment Slough;Pink/red 10/12/22 1312   Drainage Amount Small 10/12/22 1312   Drainage Description Purulent 10/12/22 1312   Odor None 10/12/22 1312   Jayde-wound Assessment Fragile; Intact 10/12/22 1312   Margins Attached edges; Defined edges 10/12/22 1312   Number of days: 0    L Upper Buttock:       Wound 10/12/22 Buttocks Left; Lower (Active)   Wound Image   10/12/22 1312   Wound Etiology Pressure Stage 2 10/12/22 1312   Dressing Status New drainage noted 10/12/22 1312   Wound Cleansed Not Cleansed 10/12/22 1312   Dressing/Treatment Pharmaceutical agent (see MAR) 10/12/22 1312   Dressing Change Due 10/12/22 10/12/22 1312   Wound Length (cm) 11 cm 10/12/22 1312   Wound Width (cm) 6 cm 10/12/22 1312   Wound Depth (cm) 0.1 cm 10/12/22 1312   Wound Surface Area (cm^2) 66 cm^2 10/12/22 1312   Wound Volume (cm^3) 6.6 cm^3 10/12/22 1312   Wound Assessment Pink/red 10/12/22 1312   Drainage Amount Small 10/12/22 1312   Drainage Description Serosanguinous 10/12/22 1312   Odor None 10/12/22 1312   Jayde-wound Assessment Fragile; Intact 10/12/22 1312   Margins Attached edges; Defined edges 10/12/22 1312   Number of days: 0   L Lower Buttock:      Response to treatment:  Well tolerated by patient. Pain Assessment:  Severity:  0 / 10  Quality of pain: N/A  Wound Pain Timing/Severity: none  Premedicated: No    Plan   Plan of Care: Wound 10/12/22 Heel Right-Dressing/Treatment: Alginate with Ag, Foam  Wound 10/12/22 Buttocks Left;Upper-Dressing/Treatment: Pharmaceutical agent (see MAR), Foam (Santyl)  Wound 10/12/22 Buttocks Left; Lower-Dressing/Treatment: Pharmaceutical agent (see MAR) (Venelex)  Recommendation: R Heel - clean with NS, cover wound with alginate ag (Aquacel) - change daily, foam dressing  L Upper Buttock - clean with NS, cover slough with nickel thick Santyl, foam dressing, change daily  L Lower Buttocks - clean with NS, apply Venelex BID  No depends when in bed  Offloading boots at all times  Reposition pt every 2 hours  Call Wound Care for deterioration [phone]    Specialty Bed Required : Yes   [x] Low Air Loss   [x] Pressure Redistribution  [] Fluid Immersion  [] Bariatric  [] Total Pressure Relief  [] Other:     Current Diet: ADULT DIET; Regular; Low Fat/Low Chol/High Fiber/MIRELLA; Low Phosphorus (Less than 1000 mg); 1500 ml  Dietician consult:  No    Discharge Plan:  Placement for patient upon discharge: home with support    Patient appropriate for Outpatient [address]: Yes    Referrals:  [x]   [] [facility]  [] Supplies  [] Other    Patient/Caregiver Teaching:  Level of patient/caregiver understanding able to:   [] Indicates understanding       [] Needs reinforcement  [] Unsuccessful      [] Verbal Understanding  [] Demonstrated understanding       [] No evidence of learning  [] Refused teaching         [] N/A       Electronically signed by Minnie Omer RN, Birgit Matthews on 10/12/2022 at 2:49 PM

## 2022-10-12 NOTE — PROGRESS NOTES
Hospitalist Progress Note      PCP: Chalino Galvan MD    Date of Admission: 10/11/2022    Chief Complaint:  ESRD needing dialysis, volume overload    Subjective: Feels fine today no new complaints     Medications:  Reviewed    Infusion Medications    sodium chloride       Scheduled Medications    ferrous sulfate  325 mg Oral Daily with breakfast    sodium chloride flush  5-40 mL IntraVENous 2 times per day    cephALEXin  500 mg Oral Daily    apixaban  2.5 mg Oral BID    pantoprazole  40 mg Oral BID AC    polyethylene glycol  17 g Oral Daily    timolol  1 drop Left Eye BID    dorzolamide  1 drop Left Eye TID    traZODone  50 mg Oral Nightly    aspirin  81 mg Oral Daily    atorvastatin  80 mg Oral Nightly     PRN Meds: sodium chloride flush, sodium chloride, ondansetron **OR** ondansetron, polyethylene glycol, acetaminophen **OR** acetaminophen, midodrine    No intake or output data in the 24 hours ending 10/12/22 0912    Exam:    BP (!) 99/45   Pulse 91   Temp 97.8 °F (36.6 °C) (Oral)   Resp 16   Ht 5' 9\" (1.753 m)   Wt 205 lb 4 oz (93.1 kg)   SpO2 99%   BMI 30.31 kg/m²     General appearance: No apparent distress, appears stated age and cooperative. HEENT: Pupils equal, round, and reactive to light. Conjunctivae/corneas clear. Neck: Supple, with full range of motion. No jugular venous distention. Trachea midline. Respiratory:  Normal respiratory effort. Clear to auscultation, bilaterally without Rales/Wheezes/Rhonchi. Cardiovascular: Regular rate and rhythm with normal S1/S2 without murmurs, rubs or gallops. Abdomen: Soft, non-tender, non-distended with normal bowel sounds. Musculoskelatal: No clubbing, cyanosis or edema bilaterally. Skin: Skin color, texture, turgor normal.  No rashes or lesions.   Neurologic:  Cranial nerves: II-XII intact, grossly non-focal.  Psychiatric: Alert and oriented, thought content appropriate, normal insight    Labs:   Recent Labs     10/11/22  1120   WBC 9. 5   HGB 10.2*   HCT 33.3*        Recent Labs     10/11/22  1120 10/12/22  0634    133*   K 4.3 3.7   CL 99 98*   CO2 27 26   BUN 34* 20   CREATININE 2.6* 1.7*   CALCIUM 9.1 8.5     No results for input(s): AST, ALT, BILIDIR, BILITOT, ALKPHOS in the last 72 hours. No results for input(s): INR in the last 72 hours. No results for input(s): Louisa Rowels in the last 72 hours. Studies:  XR CHEST PORTABLE   Final Result      Mild perihilar opacity with pulmonary vascular indistinctness may represent mild pulmonary edema. Bandlike opacity in the right midlung may represent atelectasis. Cardiomegaly. Assessment/Plan:    Active Hospital Problems    Diagnosis Date Noted    ESRD needing dialysis ([facility] [address] [N18.6, Z99.2] 10/11/2022     Priority: Medium     PLAN:     ESRD needing dialysis  Fluid overload  Nephrology consulted  Social work consult     Tachy federico syndrome s/p PPM s/p PPM  Recent STEMI  ASA, statin, Eliquis 2.5 mg BID     Hx of MSSA bacteremia, endocarditis  Refused explant  Ancef 2 g 10/12  Transitioned to Keflex per med/rec, chart review  Rifabutin -advised patient's wife to bring from home     Stage 3 wound on buttocks  Wound care consult     DVT Prophylaxis: on Eliquis  PT/OT Eval Status: Ongoing  Diet: ADULT DIET;  Regular; Low Fat/Low Chol/High Fiber/MIRELLA; Low Phosphorus (Less than 1000 mg)  Code Status: Full Code    Dispo - Inpatient pending placement for HD    Annette Saunders DO

## 2022-10-12 NOTE — PROGRESS NOTES
Nephrology Consult Note        [facility]    [email or web address]       Phone: [phone]                                                  10/12/2022 9:26 AM     Patient: Isabelle Kerns [id number]  [id number]  Date of Admit: 10/11/2022 LOS: 1 days      Interval History and Plan:  Patient feel fine and he is comfortable. 3 hour dialysis was done but only 600 ml UF done due to low BP. Patient is on room air. Denied SOB. Lytes stable  BP soft. No indication for dialysis today  Plan for dialysis tomorrow per TTS schedule. Monitor BP   1.5 Liter /day fluid restriction  Avoid nephrotoxins  Monitor input, output and weight  Monitor labs and vitals closely  Renally dose all medications  Can be discharged from nephrology perspective once have placement/transportation issues resolved.  following. D/W patient  D/W . Thank you for allowing us to participate in this patient's care    In case of any question please call us at our 24 hour answering service [phone] or from 7 AM to 5 PM via [employer] or cell number    Vanessa Brooks MD  [facility]  [address] [place] [address]  Fax: [phone]  Office: [phone]       Assessment & Plan     Renal function:  Acute Kidney Injury and started on Hemodialysis and discharged on 10/5 from [facility] schedule  Access: [facility]. No concern for infection. 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 Lab Results   Component Value Date    CALCIUM 8.5 10/12/2022         Acid/Base status:  Stable. Volume status/BP:  BP soft. Patient has mild edema but no SOB at rest on room air. No acute volume overload concerns.      Hematology: CKD related anemia  Goal Hgb 10-11    No results found for: IRON, TIBC, FERRITIN  Lab Results   Component Value Date    WBC 9.5 10/11/2022    HGB 10.2 (L) 10/11/2022    HCT 33.3 (L) 10/11/2022    MCV 88.2 10/11/2022     10/11/2022             Reason for Consult and Chief Complaint     Reason for consult: HENRY on HD    Chief complaint:   Chief Complaint   Patient presents with    Procedure     Via EMS from home, with c/o needing dialysis, states he goes on t, th, sat. States his first dialysis treatment was this past Saturday at [facility]. States he was not able to go there today to receive treatment. Pt denies cp, sob, fevers, chills. History of Present Illness   Talib Perez is a 82 y.o. 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 Patient is accompanied by her wife. Patient is comfortable on room air and denied SOB, chest pain, fever or cough. Review of Systems   Positive in bold or unable to assess     GEN: Fever, chills, night sweats. HEENT: Changes in vision, sore throat, rhinorrhea   CVS: Chest pain, palpitations, swelling or edema in legs  Pulmonary: Cough, hemoptysis, SOB  GI: Nausea, vomiting, diarrhea, constipation, abdominal pain  : Bladder incontinence, dysuria,hematuria. MSK: Muscle or joint or bone pains  Skin: Rashes, ulcers, skin thickness  CNS: Headache, dizziness, confusion, focal weakness, seizure. Psych: Anxiety, agitation, depression. Reviewed all 12 systems, negative except as above.      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 No pertinent surgical history. Family History     History reviewed. No pertinent family history. Social History     Social History     Tobacco Use    Smoking status: Never    Smokeless tobacco: Never   Substance Use Topics    Alcohol use: Not Currently          Past medical, family, and social histories were reviewed as previously documented. Updates were made as necessary. Inpatient Medications and Allergies       Scheduled Meds:    Allergies: No Known Allergies      Vital Signs     Vitals:    10/12/22 0719   BP: (!) 99/45   Pulse: 91   Resp: 16   Temp: 97.8 °F (36.6 °C)   SpO2: 99%       No intake or output data in the 24 hours ending 10/12/22 0926      Physical Exam     General appearance: NAD  Head: Normocephalic, without obvious abnormality, atraumatic   Mouth: Moist mucous membranes. Neck: Supple. Lungs: Good air entry bilaterally. No respiratory distress on room air. Heart: S1, S2.  Abdomen: soft, non-tender non-distended  Extremities: + leg edema, warm to touch   Skin: No concerning rashes noted  Psych: good eye contact, normal affect  Neuro: AAO x 3  TDC in place.        Laboratory Data     Please see above     Diagnostic Studies   Pertinent images reviewed

## 2022-10-12 NOTE — PROGRESS NOTES
Pt working with therapy and incontinent smear of a green/black bm. Dressing on buttocks was saturated with drainage- removed old dressing, cleaned wound, and placed zinc cream and 2 sacral hearts. Also, skin breakdown on right foot with miplex placed by therapy and they also placed on left foot preventative and placed prevalon boots. Pt has about a quarter size skin breakdown on left upper abdomen. RN ordered specialty bed.

## 2022-10-12 NOTE — PROGRESS NOTES
Physical Therapy  Facility/Department: [address] [place] [address]  Physical Therapy Initial Assessment    Name: John Garay  : 1935  MRN: [id number]  Date of Service: 10/12/2022    Discharge Recommendations: John Garay scored a  on the AM-PAC short mobility form. Current research shows that an AM-PAC score of 17 or less is typically not associated with a discharge to the patient's home setting. Based on the patient's AM-PAC score and their current functional mobility deficits, it is recommended that the patient have 3-5 sessions per week of Physical Therapy at d/c to increase the patient's independence. Please see assessment section for further patient specific details. If patient discharges prior to next session this note will serve as a discharge summary. Please see below for the latest assessment towards goals. If patient discharges prior to next session this note will serve as a discharge summary. Please see below for the latest assessment towards goals. PT Equipment Recommendations  Equipment Needed: No  Other: defer      Patient Diagnosis(es): The encounter diagnosis was ESRD (end stage renal disease) on dialysis ([facility] [address] [facility] Past Medical History:  has no past medical history on file. Past Surgical History:  has no past surgical history on file. Assessment   Body Structures, Functions, Activity Limitations Requiring Skilled Therapeutic Intervention: Decreased functional mobility ; Decreased strength;Decreased endurance;Decreased balance  Assessment: Pt has been wheel chair level for 3 months, from home with wife. He currently uses a micky life for all transfers. Pt was ambulatory before  hospitalization in April. Pt requires 2-3 people for all bed mobility and sitting EOB. He has no initation of LE muscles and needs max A for all pericare. Pt states he would like to recieve more therapy in order to improve strength, functional mobility and ambulation.  PT rec the pt have continued skilled PT in order to improve his functional mobility, transfers and safety before return to home. IF pt is to return home, 24 hr capable assistance and HHPT. Treatment Diagnosis: Impaired functional mobility  Therapy Prognosis: Good  Decision Making: Medium Complexity  Requires PT Follow-Up: Yes  Activity Tolerance  Activity Tolerance: Patient limited by fatigue     Plan   Physcial Therapy Plan  General Plan:  (2-5)  Current Treatment Recommendations: Strengthening, Balance training, Gait training, Stair training, Functional mobility training, Transfer training, Endurance training, Patient/Caregiver education & training  Safety Devices  Type of Devices: All ronny prominences offloaded, Left in bed, Bed alarm in place, Call light within reach, Nurse notified, Genoveva Earl elevated for pressure relief  Restraints  Restraints Initially in Place: No     Restrictions  Position Activity Restriction  Other position/activity restrictions: Up with assistance     Subjective   General  Chart Reviewed: Yes  Patient assessed for rehabilitation services?: Yes  Additional Pertinent Hx: Pt is an 83 yo male that presents from home for diaylsis. Pt recent was released from [facility]: Mild perihilar opacity with pulmonary vascular indistinctness may represent mild pulmonary edema. Referring Practitioner: Chantell López DO  Referral Date : 10/11/22  Diagnosis: ESRD needing Dialysis  Subjective  Subjective: Pt supine in bed.  Agreeable to therapy         Social/Functional History  Social/Functional History  Lives With: Spouse  Type of Home: House  Home Layout: One level  Home Access: Stairs to enter without rails  Entrance Stairs - Number of Steps: 2  Bathroom Shower/Tub: Walk-in shower, Tub/Shower unit  Bathroom Toilet: Standard  Bathroom Equipment: Grab bars around toilet  Home Equipment:  (micky lift)  Has the patient had two or more falls in the past year or any fall with injury in the past year?: Yes  Receives Help From: Family  ADL Assistance: Needs assistance (can put UE clothing on)  Feeding: Independent  Ambulation Assistance: Non-ambulatory (has been using W/C for past 3-4 months)  Transfer Assistance: Needs assistance (using micky)  Active : No  Patient's  Info: wife drives  Additional Comments: \"wife does 99.9%\" of things, help from son and daughter a few houses down; wife is always around.  Pt it questionable historian this date with timeline of hospitalizations  Vision/Hearing  Vision  Vision: Impaired (R eye shut during treatment reported to be at baseline)  Vision Exceptions: Wears glasses at all times  Hearing  Hearing: Within functional limits    Cognition   Orientation  Overall Orientation Status: Within Functional Limits  Orientation Level: Oriented to person;Oriented to time;Oriented to situation     Objective   Heart Rate: (!) 107  Heart Rate Source: Monitor  BP: (!) 129/54  BP Location: Left upper arm  BP Method: Automatic  Patient Position: Semi fowlers  MAP (Calculated): 79  Resp: 16  SpO2: 98 %  O2 Device: None (Room air)        Gross Assessment  AROM: Grossly decreased, non-functional  Strength: Grossly decreased, non-functional                 Balance  Sitting: Impaired (Max A x1-2 for seated balance at EOB ~5 mins, pt using bilat hand rails)  Sitting - Static: Poor (constant support)  Sitting - Dynamic: Poor (constant support)  Bed mobility  Rolling to Left: 2 Person assistance;Dependent/Total  Rolling to Right: Dependent/Total;2 Person assistance  Supine to Sit: 2 Person assistance;Dependent/Total  Sit to Supine: Dependent/Total;2 Person assistance  Scooting: Dependent/Total;2 Person assistance  Bed Mobility Comments: Pt was dependent x2-3 for rolling and sup<>sit, verbal cues for hand placement  Transfers  Comment: Unable to assess due to fatigue        Balance  Sitting - Static: Poor  Sitting - Dynamic: Poor (Max A 1-2 throughout sitting EOB 10 minutes)           OutComes Score      AM-PAC Score  AM-PAC Inpatient Mobility Raw Score : 6 (10/12/22 1231)  AM-PAC Inpatient T-Scale Score : 23.55 (10/12/22 1231)  Mobility Inpatient CMS 0-100% Score: 100 (10/12/22 1231)  Mobility Inpatient CMS G-Code Modifier : CN (10/12/22 1231)          Tinneti Score       Goals  Short Term Goals  Time Frame for Short Term Goals: Rolling right to left Min A  Short Term Goal 1: Sit<>supine Min A  Short Term Goal 2: Sit<>Stand with RW Mod A  Patient Goals   Patient Goals :  To walk again       Education  Patient Education  Education Given To: Patient  Education Provided: Role of Therapy;Transfer Training  Education Method: Demonstration  Barriers to Learning: None  Education Outcome: Verbalized understanding      Therapy Time   Individual Concurrent Group Co-treatment   Time In [phone]         Time Out 0935         Minutes 60          Timed Code Treatment Minutes:   45    Total Treatment Minutes:  [address] [place], [place] #[address]

## 2022-10-12 NOTE — PROGRESS NOTES
4 Eyes Admission Assessment     I agree as the admission nurse that 2 RN's have performed a thorough Head to Toe Skin Assessment on the patient. ALL assessment sites listed below have been assessed on admission. Areas assessed by both nurses: nathan and gladis  [x]   Head, Face, and Ears   [x]   Shoulders, Back, and Chest  [x]   Arms, Elbows, and Hands   [x]   Coccyx, Sacrum, and Ischium  [x]   Legs, Feet, and Heels        Does the Patient have Skin Breakdown?   Yes a wound was noted on the Admission Assessment and an LDA was Initiated documentation include the Jayde-wound, Wound Assessment, Measurements, Dressing Treatment, Drainage, and Color\",       Right heel unstageable, multiple areas on buttocks, scab/healing left lower chest.  Suraj Prevention initiated:  Yes   Wound Care Orders initiated:  Yes      Km Ventura consulted for Pressure Injury (Stage 3,4, Unstageable, DTI, NWPT, and Complex wounds) or Suraj score 18 or lower:  Yes      Nurse 1 eSignature: Electronically signed by Dayna Benitez RN on 10/12/22 at 6:13 PM EDT    **SHARE this note so that the co-signing nurse is able to place an eSignature**    Nurse 2 eSignature: Electronically signed by Rennie Dancer, RN on 10/12/22 at 6:41 PM EDT

## 2022-10-12 NOTE — PROGRESS NOTES
Treatment time: 3hrs    Net UF: 600ml    Pre weight: 98.4kg  Post weight: 97.8kg      Access used: R chest CVC  Access function: well     Medications or blood products given: Ancef    Summary of response to treatment: pt tolerated tx, had issues with low bp     Copy of dialysis treatment record placed in chart, to be scanned into EMR.

## 2022-10-12 NOTE — PLAN OF CARE
Problem: Pain  Goal: Verbalizes/displays adequate comfort level or baseline comfort level  10/12/2022 1311 by Jorge Bradley RN  Outcome: Progressing  Note: Patient complains of pain at level 7/10. Patient describes pain as ache. Patient requests pain medication. Patient medicated with declined meds and repositioned with relief . Will continue to monitor. Problem: Skin/Tissue Integrity  Goal: Absence of new skin breakdown  Description: 1. Monitor for areas of redness and/or skin breakdown  2. Assess vascular access sites hourly  3. Every 4-6 hours minimum:  Change oxygen saturation probe site  4. Every 4-6 hours:  If on nasal continuous positive airway pressure, respiratory therapy assess nares and determine need for appliance change or resting period. 10/12/2022 1311 by Jorge Bradley RN  Outcome: Progressing  Note: Pt with wounds and wound care came and assessed pt-awaiting orders. Problem: Safety - Adult  Goal: Free from fall injury  10/12/2022 1311 by Jorge Bradley RN  Outcome: Progressing  Note: Discussed with pt importance to keep bed low and locked with alarm activated, nonskid socks on when out of bed, call light and belongings within reach- will monitor.

## 2022-10-13 NOTE — PROGRESS NOTES
Right heel- cleansed with NS, aquacel AG and foam dressing applied. Left upper buttocks cleansed with NS, santyl applied, foam dressing applied. Left lower buttocks cleansed with NS, venelex applied, PAOLA. Pt tolerated well. Pt moved to special air bed. Turned onto left side with wedge support, offlloading boots bilaterally. Pt denies needs at this time.  Call light in reach, bed alarm on for safety

## 2022-10-13 NOTE — PLAN OF CARE
Problem: Pain  Goal: Verbalizes/displays adequate comfort level or baseline comfort level  Outcome: Progressing     Problem: Safety - Adult  Goal: Free from fall injury  Outcome: Progressing     Problem: ABCDS Injury Assessment  Goal: Absence of physical injury  Outcome: Progressing     Patient resting in bed with wife at bedside. Denies pain/further needs, will continue to monitor.

## 2022-10-13 NOTE — DISCHARGE INSTR - COC
Continuity of Care Form    Patient Name: Aurora Kebede   :  1935  MRN:  [id number]    Admit date:  10/11/2022  Discharge date:  ***    Code Status Order: Full Code   Advance Directives:     Admitting Physician:  Judy Wyatt DO  PCP: Ever Reveles MD    Discharging Nurse: [facility] Unit/Room#: 4845/4207-10  Discharging Unit Phone Number: ***    Emergency Contact:   Extended Emergency Contact Information  Primary Emergency Contact: Char Cummings  Address: [address] [place] [address] Phone: [phone]  Relation: Spouse  Secondary Emergency Contact: Char Cummings  Address: [address] [place] [address] Phone: [phone]  Mobile Phone: [phone]  Relation: Spouse    Past Surgical History:  History reviewed. No pertinent surgical history.     Immunization History:   Immunization History   Administered Date(s) Administered    COVID-19, MODERNA BLUE border, Primary or Immunocompromised, (age 12y+), IM, 100 mcg/0.5mL 2021, 2021       Active Problems:  Patient Active Problem List   Diagnosis Code    [facility] N18.6, Z99.2       Isolation/Infection:   Isolation            No Isolation          Patient Infection Status       None to display            Nurse Assessment:  Last Vital Signs: /63   Pulse 89   Temp 97.6 °F (36.4 °C) (Oral)   Resp 16   Ht 5' 9\" (1.753 m)   Wt 209 lb 14.1 oz (95.2 kg)   SpO2 97%   BMI 30.99 kg/m²     Last documented pain score (0-10 scale): Pain Level: 6  Last Weight:   Wt Readings from Last 1 Encounters:   10/13/22 209 lb 14.1 oz (95.2 kg)     Mental Status:  {IP PT MENTAL STATUS:}    IV Access:  { NOHEMI IV ACCESS:124965939}    Nursing Mobility/ADLs:  Walking   {CHP DME KHVT:120370459}  Transfer  {CHP DME WGKK:461547227}  Bathing  {CHP DME PFDW:194848373}  Dressing  {CHP DME YQVP:488403768}  Toileting {CHP DME BCJY:200582984}  Feeding  {CHP DME UQHV:328393378}  Med Admin  {CHP DME IFRO:858763105}  Med Delivery   [facility] MED Delivery:414800103}    Wound Care Documentation and Therapy:  Wound 10/12/22 Heel Right (Active)   Wound Image   10/12/22 1312   Wound Etiology Pressure Stage 2 10/12/22 1312   Dressing Status Clean;Dry; Intact 10/13/22 0800   Wound Cleansed Cleansed with saline 10/12/22 2010   Dressing/Treatment Alginate with Ag; Foam 10/12/22 2010   Offloading for Diabetic Foot Ulcers Offloading boot 10/13/22 0800   Dressing Change Due 10/13/22 10/12/22 1312   Wound Length (cm) 2 cm 10/12/22 1312   Wound Width (cm) 1.5 cm 10/12/22 1312   Wound Depth (cm) 0.3 cm 10/12/22 1312   Wound Surface Area (cm^2) 3 cm^2 10/12/22 1312   Wound Volume (cm^3) 0.9 cm^3 10/12/22 1312   Wound Assessment Pink/red;Slough 10/12/22 1312   Drainage Amount Scant 10/12/22 1312   Drainage Description Serosanguinous 10/12/22 1312   Odor None 10/12/22 1312   Jayde-wound Assessment Dry/flaky; Intact 10/12/22 1312   Margins Attached edges; Defined edges 10/12/22 1312   Number of days: 1       Wound 10/12/22 Buttocks Left;Upper (Active)   Wound Image   10/12/22 1312   Wound Etiology Pressure Unstageable 10/13/22 0800   Dressing Status New drainage noted 10/12/22 1312   Wound Cleansed Cleansed with saline 10/12/22 2010   Dressing/Treatment Pharmaceutical agent (see MAR); Foam 10/13/22 0800   Dressing Change Due 10/12/22 10/12/22 1312   Wound Length (cm) 5.6 cm 10/12/22 1312   Wound Width (cm) 3.5 cm 10/12/22 1312   Wound Depth (cm) 0.2 cm 10/12/22 1312   Wound Surface Area (cm^2) 19.6 cm^2 10/12/22 1312   Wound Volume (cm^3) 3.92 cm^3 10/12/22 1312   Wound Assessment Slough;[place]/red 10/13/22 0800   Drainage Amount Small 10/12/22 1312   Drainage Description Purulent 10/12/22 1312   Odor None 10/12/22 1312   Jayde-wound Assessment Fragile; Intact 10/12/22 1312   Margins Attached edges; Defined edges 10/12/22 1312   Number of days: 0       Wound 10/12/22 Buttocks Left; Lower (Active)   Wound Image   10/12/22 1312   Wound Etiology Pressure Stage 2 10/13/22 0800   Dressing Status New drainage noted 10/13/22 0800   Wound Cleansed Cleansed with saline 10/12/22 2010   Dressing/Treatment Pharmaceutical agent (see MAR) 10/13/22 0800   Dressing Change Due 10/12/22 10/12/22 1312   Wound Length (cm) 11 cm 10/12/22 1312   Wound Width (cm) 6 cm 10/12/22 1312   Wound Depth (cm) 0.1 cm 10/12/22 1312   Wound Surface Area (cm^2) 66 cm^2 10/12/22 1312   Wound Volume (cm^3) 6.6 cm^3 10/12/22 1312   Wound Assessment Pink/red 10/13/22 0800   Drainage Amount Moderate 10/13/22 0800   Drainage Description Serosanguinous 10/13/22 0800   Odor None 10/12/22 1312   Jayde-wound Assessment Fragile; Intact 10/13/22 0800   Margins Attached edges; Defined edges 10/12/22 1312   Number of days: 0        Elimination:  Continence: Bowel: {YES / MA:60529}  Bladder: {YES / XI:79272}  Urinary Catheter: {Urinary Catheter:154823297}   Colostomy/Ileostomy/Ileal Conduit: {YES / LD:05868}       Date of Last BM: ***    Intake/Output Summary (Last 24 hours) at 10/13/2022 1317  Last data filed at 10/13/2022 1205  Gross per 24 hour   Intake 2260 ml   Output 1250 ml   Net 1010 ml     I/O last 3 completed shifts:   In: 12 [P.O.:260]  Out: 150 [Urine:150]    Safety Concerns:     [address] Safety Concerns:051892217}    Impairments/Disabilities:      [address] Impairments/Disabilities:438753584}    Nutrition Therapy:  Current Nutrition Therapy:   [address] Diet List:417656940}    Routes of Feeding: {CHP DME Other Feedings:369735574}  Liquids: {Slp liquid thickness:46832}  Daily Fluid Restriction: {CHP DME Yes amt example:528638372}  Last Modified Barium Swallow with Video (Video Swallowing Test): {Done Not Done DJAO:998931165}    Treatments at the Time of Hospital Discharge:   Respiratory Treatments: ***  Oxygen Therapy:  {Therapy; copd oxygen:56723}  Ventilator:    {MH CC Vent ACRA:201439745}    Rehab Therapies: {THERAPEUTIC INTERVENTION:3087684344}  Weight Bearing Status/Restrictions: [address] CC Weight Bearin}  Other Medical Equipment (for information only, NOT a DME order):  {EQUIPMENT:807679025}  Other Treatments: ***    Patient's personal belongings (please select all that are sent with patient):  {CHP DME Belongings:829033010}    RN SIGNATURE:  {Esignature:344738859}    CASE MANAGEMENT/SOCIAL WORK SECTION    Inpatient Status Date: 10/11/22    Readmission Risk Assessment Score:  Readmission Risk              Risk of Unplanned Readmission:  17           Discharging to Facility/ Agency   [facility]     [facility]       / signature: Electronically signed by Batsheva Mcbride RN on 10/25/22 at 3:18 PM EDT    PHYSICIAN SECTION    Prognosis: {Prognosis:5675190760}    Condition at Discharge: Stable    Rehab Potential (if transferring to Rehab): Good    Recommended Labs or Other Treatments After Discharge:     PT/OT  General wound care  Continue antibiotics  Follow-up with Infectious disease at [facility] and for managing Keflex and Rifabutin  Follow-up with patient's cardiologist at [facility]  Confirmed recent med/rec except ASA recently held. Patient should follow-up with his cardiologist or primary care provider given several recent hospitalizations for med/rec. Hold anticoagulant \"for 2 weeks\" per GI. Repeat hgb to ensure stable. Physician Certification: I certify the above information and transfer of Yulissa Cross  is necessary for the continuing treatment of the diagnosis listed and that he requires [facility] for greater 30 days.      Update Admission H&P: No change in H&P    PHYSICIAN SIGNATURE:  Electronically signed by Kareem Ramon DO on 10/14/22 at 4:09 PM EDT

## 2022-10-13 NOTE — PROGRESS NOTES
Comprehensive Nutrition Assessment    Type and Reason for Visit:  Initial    Nutrition Recommendations/Plan:   Continue current diet  ONS; Nepro TID     Malnutrition Assessment:  Malnutrition Status:  Insufficient data (10/13/22 1156)    Context:  Acute Illness         Nutrition Assessment:    Nutrition Assessment triggered by wounds. Pt with ESRD admitted for dialysis. Transportation issues getting to Dialysis Clinic. Pt is on a Dysphagia-Minced and Moist, Cardiac, MIRELLA, Low phosphorus diet. Pt not in room upon visit- in HD. Noted meal intake of 1-25% x1.  lbs. No wt hx per EMR. Pt with pressure injury to rt heel, left upper and lower buttocks. Nepro TID ordered which provides additional 420 kcal/19 gm pro/ea and should assist with healing of wounds. Mykel monitor adequacy of po intake and acceptance of supplement. Nutrition Related Findings:    K+ & phos wnl, Na 137, glu 73. No edema. LBM 10/13. Wound Type: Pressure Injury, Stage II (PI to lt heel; US PI left upper buttock; St 2 PI left lower buttocks)       Current Nutrition Intake & Therapies:    Average Meal Intake: 1-25%  Average Supplements Intake: Unable to assess  ADULT ORAL NUTRITION SUPPLEMENT; Breakfast, Lunch, Dinner; Renal Oral Supplement  ADULT DIET; Dysphagia - Minced and Moist; Low Fat/Low Chol/High Fiber/MIRELLA; Low Phosphorus (Less than 1000 mg)    Anthropometric Measures:  Height: 5' 9\" (175.3 cm)  Ideal Body Weight (IBW): 160 lbs (73 kg)       Current Body Weight: 207 lb (93.9 kg), 129.4 % IBW.  Weight Source: Bed Scale  Current BMI (kg/m2): 30.6                               Estimated Daily Nutrient Needs:  Energy Requirements Based On: Kcal/kg (25-30 kcal/IBW/73 kg)  Weight Used for Energy Requirements: Ideal  Energy (kcal/day): 7158-5476  Weight Used for Protein Requirements: Ideal  Protein (g/day): 88-95  Method Used for Fluid Requirements: Standard Renal       Nutrition Diagnosis:   Increased nutrient needs related to increase demand for

## 2022-10-13 NOTE — PROGRESS NOTES
Physical /Occupational Therapy Attempt    Per RN, patient is currently at dialysis. Will attempt at a later time if schedule permits.      Elmer Arizmendi , PT, DPT #[id number]  [facility], OT, [id number]

## 2022-10-13 NOTE — PROGRESS NOTES
Nephrology Consult Note        [facility]ph[facility]    [email or web address]       Phone: [phone]                                                  10/13/2022 9:32 AM     Patient: Makenzie Kelley [id number]  [id number]  Date of Admit: 10/11/2022 LOS: 2 days      Interval History and Plan:  Patient seen during dialysis  Comfortable and denied any complaints  BP low in 80s  Patient is on room air. Denied SOB. Lytes stable      Dialysis today per TTS schedule. Patient was given IV Albumin and will also give IV normal saline bolus 500 ml  Monitor BP after dialysis. Epogen during dialysis. Avoid nephrotoxins  Monitor input, output and weight  Monitor labs and vitals closely  Renally dose all medications  Can be discharged from nephrology perspective once have placement/transportation issues resolved.  following. D/W patient  D/W dialysis nurse      Thank you for allowing us to participate in this patient's care    In case of any question please call us at our 24 hour answering service [phone] or from 7 AM to 5 PM via [employer] or cell number    Ghassan Nava MD  [facility] Nephrology  [address], [address]  Fax: [phone]  Office: [phone]       Assessment & Plan     Renal function:  Acute Kidney Injury and started on Hemodialysis and discharged on 10/5 from [facility] schedule  Access: [facility]. No concern for infection. 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 Lab Results   Component Value Date    CALCIUM 8.8 10/13/2022    PHOS 2.8 10/13/2022    PHOS 2.9 10/13/2022         Acid/Base status:  Stable. Volume status/BP:  Hypotension. Patient has mild edema but no SOB at rest on room air. No acute volume overload concerns. Hematology:   CKD related anemia  Goal Hgb 10-11    No results found for: IRON, TIBC, FERRITIN  Lab Results   Component Value Date    WBC 8.8 10/13/2022    HGB 9.2 (L) 10/13/2022    HCT 28.4 (L) 10/13/2022    MCV 85.8 10/13/2022     10/13/2022             Reason for Consult and Chief Complaint     Reason for consult: HENRY on HD    Chief complaint:   Chief Complaint   Patient presents with    Procedure     Via EMS from home, with c/o needing dialysis, states he goes on t, th, sat. States his first dialysis treatment was this past Saturday at [facility]. States he was not able to go there today to receive treatment. Pt denies cp, sob, fevers, chills. History of Present Illness   Lilibeth Escalante is a 82 y.o. 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 Patient is accompanied by her wife. Patient is comfortable on room air and denied SOB, chest pain, fever or cough. Review of Systems   Positive in bold or unable to assess     GEN: Fever, chills, night sweats. HEENT: Changes in vision, sore throat, rhinorrhea   CVS: Chest pain, palpitations, swelling or edema in legs  Pulmonary: Cough, hemoptysis, SOB  GI: Nausea, vomiting, diarrhea, constipation, abdominal pain  : Bladder incontinence, dysuria,hematuria. MSK: Muscle or joint or bone pains  Skin: Rashes, ulcers, skin thickness  CNS: Headache, dizziness, confusion, focal weakness, seizure. Psych: Anxiety, agitation, depression. Reviewed all 12 systems, negative except as above.      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 No pertinent surgical history. Family History     History reviewed. No pertinent family history. Social History     Social History     Tobacco Use    Smoking status: Never    Smokeless tobacco: Never   Substance Use Topics    Alcohol use: Not Currently          Past medical, family, and social histories were reviewed as previously documented. Updates were made as necessary. Inpatient Medications and Allergies       Scheduled Meds:    Allergies: No Known Allergies      Vital Signs     Vitals:    10/13/22 0820   BP: (!) 83/44   Pulse: 89   Resp: 16   Temp: 98.2 °F (36.8 °C)   SpO2:          Intake/Output Summary (Last 24 hours) at 10/13/2022 0932  Last data filed at 10/13/2022 0554  Gross per 24 hour   Intake 260 ml   Output 150 ml   Net 110 ml         Physical Exam     General appearance: NAD  Head: Normocephalic, without obvious abnormality, atraumatic   Mouth: Moist mucous membranes. Neck: Supple. Lungs: Good air entry bilaterally. No respiratory distress on room air. Heart: S1, S2.  Abdomen: soft, non-tender non-distended  Extremities: + leg edema, warm to touch   Skin: No concerning rashes noted  Psych: good eye contact, normal affect  Neuro: AAO x 3  TDC in place.        Laboratory Data     Please see above     Diagnostic Studies   Pertinent images reviewed

## 2022-10-14 NOTE — PROGRESS NOTES
Occupational Therapy  Facility/Department: [address]  Daily Treatment Note  NAME: Roxi Stewart  : 1935  MRN: [id number]    Date of Service: 10/14/2022    Discharge Recommendations: Roxi Stewart scored a  on the AM-PAC ADL Inpatient form. Current research shows that an AM-PAC score of 17 or less is typically not associated with a discharge to the patient's home setting. Based on the patient's AM-PAC score and their current ADL deficits, it is recommended that the patient have 3-5 sessions per week of Occupational Therapy at d/c to increase the patient's independence. Please see assessment section for further patient specific details. If patient discharges prior to next session this note will serve as a discharge summary. Please see below for the latest assessment towards goals. Patient Diagnosis(es): The encounter diagnosis was ESRD (end stage renal disease) on dialysis ([facility] [address] Assessment        Assessment: Pt limited by endurance, weakness and poor trunk control. Still requires assist of 2 for bed mobility, but with better initiation this session. Attempted sit>stand transfer with STEDY, but Pt unable to clear bottom from raised bed height. Pt would benefit from cont OT to work on ADLs, funct mob and transfers. Cont with POC      Activity Tolerance: Patient limited by endurance; Patient limited by fatigue      Plan   Occupational Therapy Plan  Times Per Week: 2-5     Restrictions  Position Activity Restriction  Other position/activity restrictions: Up with assistance        Subjective   Subjective  Subjective: Pt in bed upon arrival. Agreeable to OT/PT treat.  Daughter present  Pain: No complaint of pain  Orientation  Overall Orientation Status: Within Functional Limits  Orientation Level: Oriented to person;Oriented to time;Oriented to situation           Objective    Vitals     Bed Mobility Training  Bed Mobility Training: Yes  Rolling: Maximum assistance;Assist X2  Supine to Sit: Total assistance;Assist X2  Sit to Supine: Total assistance;Assist X2  Scooting: Total assistance;Assist X2      Balance  Sitting: Impaired (~15 min EOB- DEP)      Transfer Training  Transfer Training: Yes  Sit to Stand:  (Attempted x2 times with STEDY and raised bed height, but Pt unable to clear bottom even with Total Ax2)         ADL  LE Dressing: Dependent/Total (for socks)  Toileting: Dependent/Total (Soiled diaper due to BM- DEP for terry care and brief change)             Safety Devices  Type of Devices: Left in bed;Bed alarm in place;Nurse notified;Call light within reach; All fall risk precautions in place     Patient Education  Education Given To: Patient  Education Provided: Role of Therapy;Plan of Care;Transfer Training  Education Method: Verbal  Barriers to Learning: None  Education Outcome: Continued education needed;Verbalized understanding    Goals  Short Term Goals  Time Frame for Short Term Goals: by discharge  Short Term Goal 1: Pt will demonstrate sitting tolerance ~5 mins with Min A- not met  Short Term Goal 2: Pt will perform UE dressing with Min A for line management- npt met  Short Term Goal 3: Pt will perform bed mobility wit Mod x2 to reduce caregiver burden- not met  Short Term Goal 4: Pt will verbalize 3 pressure relief techniques to improve skin integrity and wound healing- not met  Patient Goals   Patient goals : to walk again       Therapy Time   Individual Concurrent Group Co-treatment   Time In 1345         Time Out 1438         Minutes 53         Timed Code Treatment Minutes: [place], CHRISTIE/L

## 2022-10-14 NOTE — PROGRESS NOTES
Hospitalist Progress Note      PCP: Evelia Contreras MD    Date of Admission: 10/11/2022    Chief Complaint:  ESRD needing dialysis, volume overload    Subjective: Patient says he is feeling fine today, spending time with visiting family. No new complaints. Medications:  Reviewed    Infusion Medications    sodium chloride       Scheduled Medications    [START ON 10/15/2022] epoetin destiny-epbx  10,000 Units IntraVENous Once per day on Tue Thu Sat    ferrous sulfate  325 mg Oral Daily with breakfast    Venelex   Topical BID    collagenase   Topical Daily    rifabutin  300 mg Oral Daily    sodium chloride flush  5-40 mL IntraVENous 2 times per day    cephALEXin  500 mg Oral Daily    apixaban  2.5 mg Oral BID    pantoprazole  40 mg Oral BID AC    polyethylene glycol  17 g Oral Daily    timolol  1 drop Left Eye BID    dorzolamide  1 drop Left Eye TID    traZODone  50 mg Oral Nightly    [Held by provider] aspirin  81 mg Oral Daily    atorvastatin  80 mg Oral Nightly     PRN Meds: albumin human, heparin (porcine), sodium chloride flush, sodium chloride, ondansetron **OR** ondansetron, polyethylene glycol, acetaminophen **OR** acetaminophen, midodrine      Intake/Output Summary (Last 24 hours) at 10/14/2022 1702  Last data filed at 10/13/2022 1800  Gross per 24 hour   Intake 100 ml   Output --   Net 100 ml         Exam:    BP (!) 97/50   Pulse 93   Temp 97.5 °F (36.4 °C) (Oral)   Resp 18   Ht 5' 9\" (1.753 m)   Wt 209 lb 14.1 oz (95.2 kg)   SpO2 95%   BMI 30.99 kg/m²     General appearance: No apparent distress, appears stated age and cooperative. HEENT: Pupils equal, round, and reactive to light. Conjunctivae/corneas clear. Neck: Supple, with full range of motion. No jugular venous distention. Trachea midline. Respiratory:  Normal respiratory effort. Clear to auscultation, bilaterally without Rales/Wheezes/Rhonchi.   Cardiovascular: Regular rate and rhythm with normal S1/S2 without murmurs, rubs or gallops. Abdomen: Soft, non-tender, non-distended with normal bowel sounds. Musculoskelatal: No clubbing, cyanosis or edema bilaterally. Skin: Skin color, texture, turgor normal.  No rashes or lesions. Neurologic:  Cranial nerves: II-XII intact, grossly non-focal.  Psychiatric: Alert and oriented, thought content appropriate, normal insight    Labs:   Recent Labs     10/13/22  0634   WBC 8.8   HGB 9.2*   HCT 28.4*          Recent Labs     10/12/22  0634 10/13/22  0634 10/14/22  0651   * 137 138   K 3.7 3.7 3.9   CL 98* 102 101   CO2 26 25 28   BUN 20 28* 27*   CREATININE 1.7* 2.0* 1.4*   CALCIUM 8.5 8.8 8.4   PHOS 2.6 2.9  2.8 1.5*       No results for input(s): AST, ALT, BILIDIR, BILITOT, ALKPHOS in the last 72 hours. No results for input(s): INR in the last 72 hours. No results for input(s): Cleatrice Forte in the last 72 hours. Studies:  XR CHEST PORTABLE   Final Result      Mild perihilar opacity with pulmonary vascular indistinctness may represent mild pulmonary edema. Bandlike opacity in the right midlung may represent atelectasis. Cardiomegaly. Assessment/Plan:    Active Hospital Problems    Diagnosis Date Noted    ESRD needing dialysis ([facility] [address] [N18.6, Z99.2] 10/11/2022     Priority: Medium     PLAN:     ESRD needing dialysis  Fluid overload  Nephrology consulted  Social work consult     Tachy federico syndrome s/p PPM s/p PPM  CAD, hx of STEMI  statin, Eliquis 2.5 mg BID  Per records appears is off ASA     Hx of MSSA bacteremia, endocarditis  Refused explant  Ancef 2 g 10/12  Transitioned to Keflex per med/rec, chart review of ID recs  Rifabutin home dose  Will need follow-up with UC ID     Stage 3 wound on buttocks  Wound care consulted     DVT Prophylaxis: on Eliquis  PT/OT Eval Status: Ongoing  Diet: ADULT ORAL NUTRITION SUPPLEMENT; Breakfast, Lunch, Dinner; Renal Oral Supplement  ADULT DIET;  Dysphagia - Minced and Moist; Low Fat/Low Chol/High Fiber/MIRELLA; Low Phosphorus (Less than 1000 mg)  Code Status: Full Code    Dispo - Inpatient pending placement for HD tomorrow    Annette Saunders DO

## 2022-10-14 NOTE — PROGRESS NOTES
Physical Therapy  Facility/Department: [address] [place] [address]  Physical Therapy Daily Treatment    Name: Carter Viera  : 1935  MRN: [id number]  Date of Service: 10/14/2022    Discharge Recommendations: Carter Viera scored a  on the AM-PAC short mobility form. Current research shows that an AM-PAC score of 17 or less is typically not associated with a discharge to the patient's home setting. Based on the patient's AM-PAC score and their current functional mobility deficits, it is recommended that the patient have 3-5 sessions per week of Physical Therapy at d/c to increase the patient's independence. Please see assessment section for further patient specific details. If patient discharges prior to next session this note will serve as a discharge summary. Please see below for the latest assessment towards goals. PT Equipment Recommendations  Equipment Needed: No  Other: defer      Patient Diagnosis(es): The encounter diagnosis was ESRD (end stage renal disease) on dialysis ([facility] [address] Past Medical History:  has no past medical history on file. Past Surgical History:  has no past surgical history on file. Assessment   Body Structures, Functions, Activity Limitations Requiring Skilled Therapeutic Intervention: Decreased functional mobility ; Decreased strength;Decreased endurance;Decreased balance  Assessment: Pt continues to demo signifcantly below baseline for all functional mobility, transfers and bed mobility. Pt is a heavy assist of 2 skilled people to perform bed mobility, sitting balance, and transfers. Pt continues to have limited to no initation of any muscle groups for standing. Pt states he would like try to get up to the chair via the [address] [place] [address] stedy. After two failed attempts, pt was very anxious and upset and requested to lay back down. Pt was Dependent for scooting.  PT continues to rec the pt have conitnued skilled PT in order to improve his functional mobility, transfers and safety before return to home. IF pt is to return home, 24 hr capable assistance is needed, 24hr aides and hhpt. Will continue to follow. Treatment Diagnosis: Impaired functional mobility  Therapy Prognosis: Good  Decision Making: Medium Complexity  Requires PT Follow-Up: Yes  Activity Tolerance  Activity Tolerance: Patient limited by endurance; Patient limited by fatigue     Plan   Physcial Therapy Plan  General Plan:  (2-5)  Current Treatment Recommendations: Strengthening, Balance training, Gait training, Stair training, Functional mobility training, Transfer training, Endurance training, Patient/Caregiver education & training  Safety Devices  Type of Devices: Left in bed, Bed alarm in place, Nurse notified, Call light within reach, All fall risk precautions in place  Restraints  Restraints Initially in Place: No     Restrictions  Position Activity Restriction  Other position/activity restrictions: Up with assistance     Subjective   Subjective  Subjective: Pt supine in bed. Agreeable to therapy, wants to try to get out of bed. Social/Functional History  Social/Functional History  Lives With: Spouse  Type of Home: House  Home Layout: One level  Home Access: Stairs to enter without rails  Entrance Stairs - Number of Steps: 2  Bathroom Shower/Tub: Walk-in shower, Tub/Shower unit  Bathroom Toilet: Standard  Bathroom Equipment: Grab bars around toilet  Home Equipment:  (micky lift)  Has the patient had two or more falls in the past year or any fall with injury in the past year?: Yes  Receives Help From: Family  ADL Assistance: Needs assistance (can put UE clothing on)  Feeding: Independent  Ambulation Assistance: Non-ambulatory (has been using W/C for past 3-4 months)  Transfer Assistance: Needs assistance (using micky)  Active : No  Patient's  Info: wife drives  Additional Comments: \"wife does 99.9%\" of things, help from son and daughter a few houses down; wife is always around.  Pt it questionable historian this date with timeline of hospitalizations  Vision/Hearing  Vision  Vision: Impaired  Vision Exceptions: Wears glasses at all times  Hearing  Hearing: Within functional limits    Cognition   Orientation  Overall Orientation Status: Within Functional Limits  Orientation Level: Oriented to person;Oriented to time;Oriented to situation     Objective   Heart Rate: 92  Heart Rate Source: Monitor  BP: (!) 95/54  BP Location: Right upper arm  BP Method: Automatic  Patient Position: Semi fowlers  MAP (Calculated): 67.67  Resp: 18  SpO2: 95 %  O2 Device: None (Room air)        Gross Assessment  AROM: Grossly decreased, non-functional  Strength: Grossly decreased, non-functional                 Bed Mobility Training  Bed Mobility Training: Yes  Rolling: Maximum assistance;Assist X2  Supine to Sit: Total assistance;Assist X2  Sit to Supine: Total assistance;Assist X2  Scooting:  Total assistance;Assist X2  Balance  Sitting: Impaired (~15 min EOB- DEP)  Transfer Training  Transfer Training: Yes  Sit to Stand:  (Attempted x2 times with STEDY and raised bed height, but Pt unable to clear bottom even with Total Ax2)  Bed mobility  Bridging: Independent  Rolling to Left: Maximum assistance;2 Person assistance  Rolling to Right: Maximum assistance;2 Person assistance  Supine to Sit: Dependent/Total;2 Person assistance  Sit to Supine: Dependent/Total;2 Person assistance  Scooting: Dependent/Total;2 Person assistance  Bed Mobility Comments: Pt was dependent x2-3 for rolling and sup<>sit, verbal cues for hand placement  Transfers  Sit to Stand: Unable to assess        Balance  Sitting - Static: Poor  Sitting - Dynamic: Poor (Dependent)           OutComes Score                                                  AM-PAC Score  AM-PAC Inpatient Mobility Raw Score : 6 (10/14/22 1535)  AM-PAC Inpatient T-Scale Score : 23.55 (10/14/22 1535)  Mobility Inpatient CMS 0-100% Score: 100 (10/14/22 1535)  Mobility Inpatient CMS G-Code Modifier : CN (10/14/22 1535)          Tinneti Score       Goals  Short Term Goals  Time Frame for Short Term Goals: discharge  Short Term Goal 1: Sit<>supine Min A  Short Term Goal 2: Sit<>Stand with RW Mod A  Short Term Goal 3: Rolling right to left Min A  Patient Goals   Patient Goals :  To walk again       Education  Patient Education  Education Given To: Patient  Education Provided: Role of Therapy;Transfer Training  Education Method: Demonstration  Barriers to Learning: None  Education Outcome: Verbalized understanding      Therapy Time   Individual Concurrent Group Co-treatment   Time In [phone]         Time Out 1438         Minutes 53          Timed Code Treatment Minutes:   53    Total Treatment Minutes:  [address] , [place], [address] [place] [address] #[id number]

## 2022-10-14 NOTE — PLAN OF CARE
Problem: Discharge Planning  Goal: Discharge to home or other facility with appropriate resources  Outcome: Progressing  Flowsheets (Taken 10/14/2022 0305)  Discharge to home or other facility with appropriate resources:   Arrange for needed discharge resources and transportation as appropriate   Identify discharge learning needs (meds, wound care, etc)   Arrange for interpreters to assist at discharge as needed     Problem: Pain  Goal: Verbalizes/displays adequate comfort level or baseline comfort level  10/14/2022 0305 by Antonietta Gonzalez RN  Outcome: Progressing  Flowsheets (Taken 10/14/2022 0305)  Verbalizes/displays adequate comfort level or baseline comfort level:   Encourage patient to monitor pain and request assistance   Assess pain using appropriate pain scale   Administer analgesics based on type and severity of pain and evaluate response     Problem: Skin/Tissue Integrity  Goal: Absence of new skin breakdown  Description: 1. Monitor for areas of redness and/or skin breakdown  2. Assess vascular access sites hourly  3. Every 4-6 hours minimum:  Change oxygen saturation probe site  4. Every 4-6 hours:  If on nasal continuous positive airway pressure, respiratory therapy assess nares and determine need for appliance change or resting period.   Outcome: Progressing

## 2022-10-14 NOTE — PROGRESS NOTES
Nephrology Consult Note        [facility] Nephrology    [email or web address]       Phone: [phone]                                                  10/14/2022 2:00 PM     Patient: Angela Dominguez [id number]  [id number]  Date of Admit: 10/11/2022 LOS: 3 days      Interval History and Plan:  Patient seen at bedside  Comfortable and denied any complaints  BP soft but stable in 90s  Patient is on room air. Denied SOB. Lytes stable      No urgent indication for dialysis today. Dialysis tomorrow per TTS schedule. Monitor BP   Epogen during dialysis. Check iron studies. Avoid nephrotoxins  Monitor input, output and weight  Monitor labs and vitals closely  Renally dose all medications  Patient/family interested in home dialysis. Will arrange for education about home dialysis as outpatient. D/W patient  D/W dialysis nurse      Thank you for allowing us to participate in this patient's care    In case of any question please call us at our 24 hour answering service [phone] or from 7 AM to 5 PM via [employer] or cell number    Kristopher Akins MD  [facility] Nephrology  Itzel Professor Navin Leonardo [facility] [address], [address]  Fax: [phone]  Office: [phone]       Assessment & Plan     Renal function:  Acute Kidney Injury and started on Hemodialysis and discharged on 10/5 from [facility] schedule  Access: [facility]. No concern for infection. 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 Lab Results   Component Value Date    CALCIUM 8.4 10/14/2022    PHOS 1.5 (L) 10/14/2022         Acid/Base status:  Stable. Volume status/BP:  Hypotension. Patient has mild edema but no SOB at rest on room air. No acute volume overload concerns.      Hematology:   CKD related anemia  Goal Hgb 10-11    No results found for: IRON, TIBC, FERRITIN  Lab Results   Component Value Date    WBC 8.8 10/13/2022    HGB 9.2 (L) 10/13/2022    HCT 28.4 (L) 10/13/2022    MCV 85.8 10/13/2022     10/13/2022             Reason for Consult and Chief Complaint     Reason for consult: HENRY on HD    Chief complaint:   Chief Complaint   Patient presents with    Procedure     Via EMS from home, with c/o needing dialysis, states he goes on t, th, sat. States his first dialysis treatment was this past Saturday at [facility]. States he was not able to go there today to receive treatment. Pt denies cp, sob, fevers, chills. History of Present Illness   Poppy Zhao is a 82 y.o. 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 Patient is accompanied by her wife. Patient is comfortable on room air and denied SOB, chest pain, fever or cough. Review of Systems   Positive in bold or unable to assess     GEN: Fever, chills, night sweats. HEENT: Changes in vision, sore throat, rhinorrhea   CVS: Chest pain, palpitations, swelling or edema in legs  Pulmonary: Cough, hemoptysis, SOB  GI: Nausea, vomiting, diarrhea, constipation, abdominal pain  : Bladder incontinence, dysuria,hematuria. MSK: Muscle or joint or bone pains  Skin: Rashes, ulcers, skin thickness  CNS: Headache, dizziness, confusion, focal weakness, seizure. Psych: Anxiety, agitation, depression. Reviewed all 12 systems, negative except as above.      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 No pertinent surgical history. Family History     History reviewed. No pertinent family history. Social History     Social History     Tobacco Use    Smoking status: Never    Smokeless tobacco: Never   Substance Use Topics    Alcohol use: Not Currently          Past medical, family, and social histories were reviewed as previously documented. Updates were made as necessary. Inpatient Medications and Allergies       Scheduled Meds:    Allergies: No Known Allergies      Vital Signs     Vitals:    10/14/22 1207   BP: (!) 95/54   Pulse: 92   Resp: 18   Temp: 98.2 °F (36.8 °C)   SpO2: 95%         Intake/Output Summary (Last 24 hours) at 10/14/2022 1400  Last data filed at 10/13/2022 1800  Gross per 24 hour   Intake 220 ml   Output --   Net 220 ml           Physical Exam     General appearance: NAD  Head: Normocephalic, without obvious abnormality, atraumatic   Mouth: Moist mucous membranes. Neck: Supple. Lungs: Good air entry bilaterally. No respiratory distress on room air. Heart: S1, S2.  Abdomen: soft, non-tender non-distended  Extremities: + leg edema, warm to touch   Skin: No concerning rashes noted  Psych: good eye contact, normal affect  Neuro: AAO x 3  TDC in place.        Laboratory Data     Please see above     Diagnostic Studies   Pertinent images reviewed

## 2022-10-14 NOTE — CARE COORDINATION
Update CM  recv'd  call back from  [facility]  , they can accept the pt  but not til he  has an  HD run w/o Albumin ,  He had  Albumin his last run  and they  need him to have  a run  w/o ,  per  [employer] on site;    CM  send  PS message  to Nephrology and  attending . To update. Message sent:  Just FYI: [id number] was accepted at [facility] but they cannot take him today because his last HD run he had Albumin, he can go after he has a run w/o albumin , per [employer] [facility] onsite . dialysis. Is his next run Sat , then we can transfer him   after . ? ReplLata Bustamante DO - 10/14/2022 1:47 PM  I think it is on Saturday yes is his usual schedule. Maryse Conte MD - 10/14/2022 1:51 PM  Yes on Saturday    CM  will plan accordingly to  transfer  after  next  HD  . Electronically signed by Tawanda Lawrence RN on 10/14/2022 at 1:56 PM          Tawanda Lawrence RN Case Manager  [facility]  [address] [place] [address]. [facility] [address]  [phone]  Fax [phone]           CM  following for  d/c planning:    Patient  needs  SNF  w/  HD  either  on site  or  facility that will secure transport  to  outpt  HD   as  prior to  admission . Previous CM sent referrals to  the following SNFs that have inpatient dialysis services and informed them that this patient is micky lift and need stretcher transport to any OP HD facility :     1.) [facility]:   [facility] in admissions reviewed and  Can accept  the patient:  CM  d/w  pt  , his  spouse  Pankaj Rojas and  dgfede Vargas at  bedside  and  explained  the  limits options  d/t  HD  onsite  vs  difficulty  w/ Out pt  HD  with  transport  arrangements  , they acknowledged. They are  aware  this  is short term. And  she  the wife  is  continuing to look into transport  for  HD  when he returns home.          CM  would  usually start pre cert  but  in this  situation  [employer]  will need to initiate  [employer] pre cert  d/t  being out of  network and needing  HD  , they will need a one-time contract :  <CM faxed HD Run sheets  to Kiersten Govea to initiate  the  pre cert  today and  she will also call Oly Conway  to answer  any questions she might have  as well. -  In the event we  get  a determination today :  transport  is arranged  for  1815  via [facility]  [phone]. HENS submitted:    Document ID : [id number]  RN to call report  upon  d/c .    CM  to  fax  [address]  unless Kiersten Govea  pulls from  [address] [place] [address] Rd.         +++++++++++++++++++++++++++++++++++++++++++++++++++++++++++++++  2.) [facility]:  pending    3.) [facility]:  Currently no bed avail: 10/13  poss HD bed  opening next  week. 4.) [facility]:  No beds  currently available  5.) [facility]:  No beds  currently available    Electronically signed by Kofi Parsons RN on 10/14/2022 at 11:04 AM        Kofi Parsons RN Case Manager  [facility]  [address] [place] [address].   [facility] [id number] [phone]  Fax [phone]

## 2022-10-14 NOTE — PROGRESS NOTES
Hospitalist Progress Note      PCP: Juan Jama MD    Date of Admission: 10/11/2022    Chief Complaint:  ESRD needing dialysis, volume overload    Subjective: Family brought antibiotic from home last night, wife at bedside. No new complaints. Medications:  Reviewed    Infusion Medications    sodium chloride       Scheduled Medications    ferrous sulfate  325 mg Oral Daily with breakfast    Venelex   Topical BID    collagenase   Topical Daily    rifabutin  300 mg Oral Daily    sodium chloride flush  5-40 mL IntraVENous 2 times per day    cephALEXin  500 mg Oral Daily    apixaban  2.5 mg Oral BID    pantoprazole  40 mg Oral BID AC    polyethylene glycol  17 g Oral Daily    timolol  1 drop Left Eye BID    dorzolamide  1 drop Left Eye TID    traZODone  50 mg Oral Nightly    [Held by provider] aspirin  81 mg Oral Daily    atorvastatin  80 mg Oral Nightly     PRN Meds: albumin human, heparin (porcine), sodium chloride flush, sodium chloride, ondansetron **OR** ondansetron, polyethylene glycol, acetaminophen **OR** acetaminophen, midodrine      Intake/Output Summary (Last 24 hours) at 10/14/2022 0818  Last data filed at 10/13/2022 1800  Gross per 24 hour   Intake 2120 ml   Output 1100 ml   Net 1020 ml       Exam:    BP (!) 95/45   Pulse 94   Temp 97.7 °F (36.5 °C) (Oral)   Resp 18   Ht 5' 9\" (1.753 m)   Wt 209 lb 14.1 oz (95.2 kg)   SpO2 97%   BMI 30.99 kg/m²     General appearance: No apparent distress, appears stated age and cooperative. HEENT: Pupils equal, round, and reactive to light. Conjunctivae/corneas clear. Neck: Supple, with full range of motion. No jugular venous distention. Trachea midline. Respiratory:  Normal respiratory effort. Clear to auscultation, bilaterally without Rales/Wheezes/Rhonchi. Cardiovascular: Regular rate and rhythm with normal S1/S2 without murmurs, rubs or gallops. Abdomen: Soft, non-tender, non-distended with normal bowel sounds.   Musculoskelatal: No clubbing, cyanosis or edema bilaterally. Skin: Skin color, texture, turgor normal.  No rashes or lesions. Neurologic:  Cranial nerves: II-XII intact, grossly non-focal.  Psychiatric: Alert and oriented, thought content appropriate, normal insight    Labs:   Recent Labs     10/11/22  1120 10/13/22  0634   WBC 9.5 8.8   HGB 10.2* 9.2*   HCT 33.3* 28.4*    176       Recent Labs     10/11/22  1120 10/12/22  0634 10/13/22  0634    133* 137   K 4.3 3.7 3.7   CL 99 98* 102   CO2 27 26 25   BUN 34* 20 28*   CREATININE 2.6* 1.7* 2.0*   CALCIUM 9.1 8.5 8.8   PHOS  --  2.6 2.9  2.8       No results for input(s): AST, ALT, BILIDIR, BILITOT, ALKPHOS in the last 72 hours. No results for input(s): INR in the last 72 hours. No results for input(s): Cleatrice Forte in the last 72 hours. Studies:  XR CHEST PORTABLE   Final Result      Mild perihilar opacity with pulmonary vascular indistinctness may represent mild pulmonary edema. Bandlike opacity in the right midlung may represent atelectasis. Cardiomegaly. Assessment/Plan:    Active Hospital Problems    Diagnosis Date Noted    ESRD needing dialysis ([facility] [address] [N18.6, Z99.2] 10/11/2022     Priority: Medium     PLAN:     ESRD needing dialysis  Fluid overload  Nephrology consulted  Social work consult     Tachy federico syndrome s/p PPM s/p PPM  CAD, hx of STEMI  statin, Eliquis 2.5 mg BID  Per records appears is off ASA     Hx of MSSA bacteremia, endocarditis  Refused explant  Ancef 2 g 10/12  Transitioned to Keflex per med/rec, chart review of ID recs  Rifabutin home dose  Will need follow-up with UC ID     Stage 3 wound on buttocks  Wound care consulted     DVT Prophylaxis: on Eliquis  PT/OT Eval Status: Ongoing  Diet: ADULT ORAL NUTRITION SUPPLEMENT; Breakfast, Lunch, Dinner; Renal Oral Supplement  ADULT DIET;  Dysphagia - Minced and Moist; Low Fat/Low Chol/High Fiber/MIRELLA; Low Phosphorus (Less than 1000 mg)  Code Status: Full Code    Dispo - Inpatient pending placement for HD    Annette Saunders DO

## 2022-10-15 NOTE — PROGRESS NOTES
MD informed of critically low H/H. She ordered a recheck H/H and is unsure if that is correct. FABIANO.

## 2022-10-15 NOTE — PROGRESS NOTES
Hospitalist Progress Note      PCP: Cabrera Frazier MD    Date of Admission: 10/11/2022    Chief Complaint:  ESRD needing dialysis, volume overload    Subjective:     Patient is feeling fine today, getting wound care. No new complaints. Medications:  Reviewed    Infusion Medications    sodium chloride       Scheduled Medications    epoetin destiny-epbx  10,000 Units IntraVENous Once per day on Tue Thu Sat    ferrous sulfate  325 mg Oral Daily with breakfast    Venelex   Topical BID    collagenase   Topical Daily    rifabutin  300 mg Oral Daily    sodium chloride flush  5-40 mL IntraVENous 2 times per day    cephALEXin  500 mg Oral Daily    apixaban  2.5 mg Oral BID    pantoprazole  40 mg Oral BID AC    polyethylene glycol  17 g Oral Daily    timolol  1 drop Left Eye BID    dorzolamide  1 drop Left Eye TID    traZODone  50 mg Oral Nightly    [Held by provider] aspirin  81 mg Oral Daily    atorvastatin  80 mg Oral Nightly     PRN Meds: albumin human, heparin (porcine), sodium chloride flush, sodium chloride, ondansetron **OR** ondansetron, polyethylene glycol, acetaminophen **OR** acetaminophen, midodrine      Intake/Output Summary (Last 24 hours) at 10/15/2022 1644  Last data filed at 10/15/2022 1100  Gross per 24 hour   Intake 100 ml   Output --   Net 100 ml         Exam:    BP (!) 86/46   Pulse 98   Temp 97.4 °F (36.3 °C) (Tympanic)   Resp 16   Ht 5' 9\" (1.753 m)   Wt 189 lb 6 oz (85.9 kg)   SpO2 97%   BMI 27.97 kg/m²     General appearance: No apparent distress, appears stated age and cooperative. HEENT: Pupils equal, round, and reactive to light. Conjunctivae/corneas clear. Neck: Supple, with full range of motion. No jugular venous distention. Trachea midline. Respiratory:  Normal respiratory effort. Clear to auscultation, bilaterally without Rales/Wheezes/Rhonchi. Cardiovascular: Regular rate and rhythm with normal S1/S2 without murmurs, rubs or gallops.   Abdomen: Soft, non-tender, non-distended with normal bowel sounds. Musculoskelatal: No clubbing, cyanosis or edema bilaterally. Skin: Skin color, texture, turgor normal.  No rashes or lesions. Neurologic:  Cranial nerves: II-XII intact, grossly non-focal.  Psychiatric: Alert and oriented, thought content appropriate, normal insight    Labs:   Recent Labs     10/13/22  0634 10/15/22  1401   WBC 8.8 9.2   HGB 9.2* 6.5*   HCT 28.4* 20.1*    173       Recent Labs     10/14/22  0651 10/15/22  0813 10/15/22  1401    136 139   K 3.9 4.0 4.0    101 104   CO2 28 27 27   BUN 27* 54* 60*   CREATININE 1.4* 1.7* 1.7*   CALCIUM 8.4 8.3 8.5   PHOS 1.5* 1.1* 1.4*       No results for input(s): AST, ALT, BILIDIR, BILITOT, ALKPHOS in the last 72 hours. No results for input(s): INR in the last 72 hours. No results for input(s): Buffy Sanchez in the last 72 hours. Studies:  XR CHEST PORTABLE   Final Result      Mild perihilar opacity with pulmonary vascular indistinctness may represent mild pulmonary edema. Bandlike opacity in the right midlung may represent atelectasis. Cardiomegaly. Assessment/Plan:    Active Hospital Problems    Diagnosis Date Noted    [facility] [N18.6, Z99.2] 10/11/2022     Priority: Medium     PLAN:     ESRD needing dialysis  Fluid overload  Nephrology consulted  Social work consult     Tachy federico syndrome s/p PPM s/p PPM  CAD, hx of STEMI  statin, Eliquis 2.5 mg BID  Per records appears is off ASA     Hx of MSSA bacteremia, endocarditis  Refused explant  Ancef 2 g 10/12  Transitioned to Keflex per med/rec, chart review of ID recs  Rifabutin home dose  Will need follow-up with UC ID     Stage 3 wound on buttocks  Wound care consulted    Abnormal CBC.  No evidence of bleeding seen up to this point  Repeat ordered, appears cancelled by lab  Repeat ordered again to verify     DVT Prophylaxis: on Eliquis  PT/OT Eval Status: Ongoing  Diet: ADULT ORAL NUTRITION SUPPLEMENT; Breakfast, Lunch, Dinner; Renal Oral Supplement  ADULT DIET; Dysphagia - Minced and Moist; Low Fat/Low Chol/High Fiber/MIRELLA; Low Phosphorus (Less than 1000 mg)  Code Status: Full Code    Dispo - Inpatient pending placement for HD     Annette Saunders DO     Addendum Repeat Hgb low however notified may not be accurate patient is still in HD this evening, will get stat type and screen and prepare for transfusion in case is needed. Anemia work-up ordered to further assess. Trend H&H q6 hours.

## 2022-10-15 NOTE — CARE COORDINATION
Case Management is following for discharge planning. The chart was reviewed. A DC order is noted. Mr. Christiane Montes will be receiving HD on site at the facility. Per Alaina Conte in admissions at [facility], [employer] must approve the admission. No one is available until Monday. DC delayed. Will cancel [employer] transport for this evening.     Electronically signed by ARGELIA Mccurdy RN-[facility]  Case Management  [phone]

## 2022-10-15 NOTE — PROGRESS NOTES
Patient's morning assessment has been completed. Patient is alert and oriented and vital signs are stable. Patient voiced no complaints or concerns at this time. Medications administered as ordered. Patient's bed is in the lowest position and call light is within reach.   Will continue to monitor

## 2022-10-15 NOTE — PROGRESS NOTES
Physician Progress Note      Vamsi Qureshi  CSN #:                  [id number]  :                       1935  ADMIT DATE:       10/11/2022 10:56 AM  DISCH DATE:  RESPONDING  PROVIDER #:        Kareem Ramon DO          QUERY TEXT:    Patient admitted with Fluid overload 2/2 missed dialysis in setting of ESRD. Noted documentation of Stage 3 wound buttocks by attending on 10/13. Noted   per wound nurse 10/12 assessment: Left upper buttock, unstageable pressure   ulcer and lower left buttock stage 2 pressure ulcer and right heel pressure   ulcer stage 2. If possible, please document in progress notes and discharge summary the   present on admission status and stage/location of pressure ulcers: The medical record reflects the following:  Risk Factors: 82 y.o. male with ESRD, a-fib, fluid overload  Clinical Indicators: Rt Heel: small open area with red wound bed with scant   slough. L. Upper Buttock: wound is covered with yellow slough. L Lower   Buttocks: scattered open areas with red wound bed.   Treatment: Wound care nurse consult: Elaine Guzman buttock: Santyl, LL buttock:   Venelex, Rt Heel: Santyl    Stage 1:  Non-blanchable erythema of intact skin  Stage 2:  Abrasion, Blister, Partial-thickness skin loss, with exposed dermis  Stage 3:  Full-thickness skin loss with damage or necrosis of subcutaneous   tissue  Stage 4:  Full-thickness skin & soft tissue loss through to underlying muscle,   tendon or bone  Unstageable: Obscured full-thickness skin & tissue loss  Options provided:  -- Stage 2 Pressure Ulcer of left lower buttock and right heel, with   unstageable pressure ulcer of left upper buttock present on admission  -- Stage 3 Pressure Ulcer of left lower buttock, stage 2 pressure ulcer of rt   heel and, with unstageable pressure ulcer of left upper buttock present on   admission  -- Other - I will add my own diagnosis  -- Disagree - Not applicable / Not valid  -- Disagree - Clinically unable to determine / Unknown  -- Refer to Clinical Documentation Reviewer    PROVIDER RESPONSE TEXT:    This patient has a Stage 3 Pressure Ulcer of left lower buttock, stage 2   pressure ulcer on right heel, with unstageable pressure ulcer of left upper   buttock present on admission.     Query created by: Fátima Bustillo on 10/14/2022 2:03 PM      Electronically signed by:  Shantel Leroy DO 10/15/2022 10:10 AM

## 2022-10-15 NOTE — PROGRESS NOTES
Attempted to get pt blood to him while in HD but needs type and screen so will have that drawn when pt returns from HD

## 2022-10-15 NOTE — PROGRESS NOTES
Nephrology Consult Note        [facility]    [email or web address]       Phone: [phone]                                                  10/15/2022 7:41 AM     Patient: Magalie Bergeron [id number]  [id number]  Date of Admit: 10/11/2022 LOS: 4 days      Interval History and Plan:  Patient seen at bedside  Comfortable and denied any complaints  BP soft but stable in 90- 100 range  Patient is on room air. Denied SOB. Labs pending      Dialysis today per TTS schedule. Keep even as BP soft and on room air  Monitor BP   Epogen during dialysis. Check iron studies. Avoid nephrotoxins  Monitor input, output and weight  Monitor labs and vitals closely  Renally dose all medications  Patient/family interested in home dialysis. Will arrange for education about home dialysis as outpatient. D/W patient  D/W dialysis nurse      Thank you for allowing us to participate in this patient's care    In case of any question please call us at our 24 hour answering service [phone] or from 7 AM to 5 PM via Perfect Serve or cell number    Yaneli Conte MD  [facility] Nephrology  [address]  Fax: [phone]  Office: [phone]       Assessment & Plan     Renal function:  Acute Kidney Injury and started on Hemodialysis and discharged on 10/5 from [facility] schedule  Access: [address]. No concern for infection. 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 Lab Results   Component Value Date    CALCIUM 8.4 10/14/2022    PHOS 1.5 (L) 10/14/2022         Acid/Base status:  Stable. Volume status/BP:  Hypotension. Patient has mild edema but no SOB at rest on room air. No acute volume overload concerns.      Hematology: CKD related anemia  Goal Hgb 10-11    No results found for: IRON, TIBC, FERRITIN  Lab Results   Component Value Date    WBC 8.8 10/13/2022    HGB 9.2 (L) 10/13/2022    HCT 28.4 (L) 10/13/2022    MCV 85.8 10/13/2022     10/13/2022             Reason for Consult and Chief Complaint     Reason for consult: HENRY on HD    Chief complaint:   Chief Complaint   Patient presents with    Procedure     Via EMS from home, with c/o needing dialysis, states he goes on t, th, sat. States his first dialysis treatment was this past Saturday at [facility]. States he was not able to go there today to receive treatment. Pt denies cp, sob, fevers, chills. History of Present Illness   Carter Viera is a 82 y.o. 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 Patient is accompanied by her wife. Patient is comfortable on room air and denied SOB, chest pain, fever or cough. Review of Systems   Positive in bold or unable to assess     GEN: Fever, chills, night sweats. HEENT: Changes in vision, sore throat, rhinorrhea   CVS: Chest pain, palpitations, swelling or edema in legs  Pulmonary: Cough, hemoptysis, SOB  GI: Nausea, vomiting, diarrhea, constipation, abdominal pain  : Bladder incontinence, dysuria,hematuria. MSK: Muscle or joint or bone pains  Skin: Rashes, ulcers, skin thickness  CNS: Headache, dizziness, confusion, focal weakness, seizure. Psych: Anxiety, agitation, depression. Reviewed all 12 systems, negative except as above.      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 No pertinent surgical history. Family History     History reviewed. No pertinent family history. Social History     Social History     Tobacco Use    Smoking status: Never    Smokeless tobacco: Never   Substance Use Topics    Alcohol use: Not Currently          Past medical, family, and social histories were reviewed as previously documented. Updates were made as necessary. Inpatient Medications and Allergies       Scheduled Meds:    Allergies: No Known Allergies      Vital Signs     Vitals:    10/15/22 0738   BP: 101/62   Pulse: 99   Resp: 18   Temp: 98.2 °F (36.8 °C)   SpO2: 97%       No intake or output data in the 24 hours ending 10/15/22 0741        Physical Exam     General appearance: NAD  Head: Normocephalic, without obvious abnormality, atraumatic   Mouth: Moist mucous membranes. Neck: Supple. Lungs: Good air entry bilaterally. No respiratory distress on room air. Heart: S1, S2.  Abdomen: soft, non-tender non-distended  Extremities: + leg edema, warm to touch   Skin: No concerning rashes noted  Psych: good eye contact, normal affect  Neuro: AAO x 3  TDC in place.        Laboratory Data     Please see above     Diagnostic Studies   Pertinent images reviewed

## 2022-10-16 NOTE — PROGRESS NOTES
Nephrology Consult Note        [facility] Nephrology    [email or web address]       Phone: [phone]                                                  10/16/2022 7:53 AM     Patient: Catherine Hebert [id number]  [id number]  Date of Admit: 10/11/2022 LOS: 5 days      Interval History and Plan:  Patient seen at bedside with nurse  Had HD yesterday. No SOB on room air  No chest pain  Uncomfortable due to bed position  BP low in 80- 90 range  Hb dropped to 5 to 6 range and blood transfusion is being given by primary team.   No obvious bleeding per nurse and patient. Dialysis per TTS schedule. Follow renal panel  Monitor BP closely and transfuse blood and it will also help to improve BP. OK for IVF bolus as needed. Ordered 500 ml IVF bolus  Start Midodrine. Epogen during dialysis. Ferritin was 564. IV iron  Avoid nephrotoxins  Monitor input, output and weight  Monitor labs and vitals closely  Renally dose all medications  Patient/family interested in home dialysis. Will arrange for education about home dialysis as outpatient. D/W patient      Thank you for allowing us to participate in this patient's care    In case of any question please call us at our 24 hour answering service [phone] or from 7 AM to 5 PM via Perfect Serve or cell number    Crow Campo MD  [facility] Nephrology  [facility] [address] [facility] [address], [address]  Fax: [phone]  Office: [phone]       Assessment & Plan     Renal function:  Acute Kidney Injury and started on Hemodialysis and discharged on 10/5 from [facility] schedule  Access: [facility]. No concern for infection. 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    Lab Results   Component Value Date    CALCIUM 8.5 10/15/2022    PHOS 1.4 (L) 10/15/2022         Acid/Base status:  Stable. Volume status/BP:  Hypotension. Patient has mild edema but no SOB at rest on room air. No acute volume overload concerns. Hematology:   CKD related anemia  Goal Hgb 10-11    Lab Results   Component Value Date    IRON 25 (L) 10/15/2022    TIBC 74 (L) 10/15/2022    FERRITIN 564.1 (H) 10/15/2022     Lab Results   Component Value Date    WBC 10.4 10/15/2022    HGB 5.8 (LL) 10/15/2022    HCT 17.6 (LL) 10/15/2022    MCV 84.4 10/15/2022     10/15/2022             Reason for Consult and Chief Complaint     Reason for consult: HENRY on HD    Chief complaint:   Chief Complaint   Patient presents with    Procedure     Via EMS from home, with c/o needing dialysis, states he goes on t, th, sat. States his first dialysis treatment was this past Saturday at [facility]. States he was not able to go there today to receive treatment. Pt denies cp, sob, fevers, chills. History of Present Illness   William Garcia is a 82 y.o. 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 Patient is accompanied by her wife. Patient is comfortable on room air and denied SOB, chest pain, fever or cough. Review of Systems   Positive in bold or unable to assess     GEN: Fever, chills, night sweats. HEENT: Changes in vision, sore throat, rhinorrhea   CVS: Chest pain, palpitations, swelling or edema in legs  Pulmonary: Cough, hemoptysis, SOB  GI: Nausea, vomiting, diarrhea, constipation, abdominal pain  : Bladder incontinence, dysuria,hematuria.    MSK: Muscle or joint or bone pains  Skin: Rashes, ulcers, skin thickness  CNS: Headache, dizziness, confusion, focal weakness, seizure. Psych: Anxiety, agitation, depression. Reviewed all 12 systems, negative except as above. 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 No pertinent surgical history. Family History     History reviewed. No pertinent family history. Social History     Social History     Tobacco Use    Smoking status: Never    Smokeless tobacco: Never   Substance Use Topics    Alcohol use: Not Currently          Past medical, family, and social histories were reviewed as previously documented. Updates were made as necessary. Inpatient Medications and Allergies       Scheduled Meds:    Allergies: No Known Allergies      Vital Signs     Vitals:    10/16/22 0410   BP: (!) 86/48   Pulse: 96   Resp: 16   Temp: 98.4 °F (36.9 °C)   SpO2: 97%         Intake/Output Summary (Last 24 hours) at 10/16/2022 0753  Last data filed at 10/16/2022 0410  Gross per 24 hour   Intake 406.92 ml   Output --   Net 406.92 ml           Physical Exam     General appearance: NAD  Head: Normocephalic, without obvious abnormality, atraumatic   Mouth: Moist mucous membranes. Neck: Supple. Lungs: Good air entry bilaterally. No respiratory distress on room air. Heart: S1, S2.  Abdomen: soft, non-tender non-distended  Extremities: + leg edema, warm to touch   Skin: No concerning rashes noted  Psych: good eye contact, normal affect  Neuro: AAO x 3  TDC in place.        Laboratory Data     Please see above     Diagnostic Studies   Pertinent images reviewed

## 2022-10-16 NOTE — PROGRESS NOTES
Hgb 5.6 Hct 17.5 Patient received 1 Unit PRBC overnight for a 5.8, now it's 5.6,  notified, waiting on response

## 2022-10-16 NOTE — PROGRESS NOTES
Treatment time: 3.5hr    Net UF: 700 ml    Pre weight: 85.9kg  Post weight: 85.2kg        Medications or blood products given: Epogen, 3x 25g albumin. Summary of response to treatment: unable to remove any fluid related to low blood pressures. Copy of dialysis treatment record placed in chart, to be scanned into EMR.

## 2022-10-16 NOTE — PROGRESS NOTES
Patient receiving PRBC and tolerating infusion well. Occult stool collected and sent to lab, results pending.

## 2022-10-16 NOTE — CONSULTS
Consultation Note    Patient Name: Azul Neff  : 1935  Age: 82 y.o. Admitting Physician: John Chandler DO   Date of Admission: 10/11/2022 10:56 AM   Primary Care Physician: Jacqueline Cartagena MD        Azul Neff is being seen at the request of John Chandler DO for Acute drop in hemoglobin rule out upper GI source of bleeding, history of gastric ulcer. History of Present Illness:  73-year-old patient with history of chronic renal disease on hemodialysis admitted with volume overload requiring dialysis. Patient is on chronic anticoagulation With Eliquis for history of DVT, history of endocarditis. On IV antibiotics for prior history of endocarditis and sacral decub. Admitted for dialysis  Has history of heart failure  GI consulted for acute drop in hemoglobin but there is no history of overt GI bleeding. Anticoagulation on hold. Patient reports work-up for anemia at [facility] recently, history of a gastric ulcer, negative colonoscopy. GI History:  Recent endoscopy and flex sig/colonoscopy at [facility] For anemia evaluation. History of gastric ulcer. Past Medical History:  History reviewed. No pertinent past medical history. Past Surgical History:  History reviewed. No pertinent surgical history. Historical Medications:  Prior to Visit Medications    Medication Sig Taking? Authorizing Provider   collagenase 250 UNIT/GM ointment Apply topically daily. Yes John Chandler DO   Balsam Peru-Castor Oil (VENELEX) OINT ointment Apply topically 2 times daily Yes Annette Saunders DO   midodrine (PROAMATINE) 10 MG tablet Take 1 tablet by mouth 3 times daily as needed (For SBP persistently <90. Must give with meals. Do not give withnin 4 hours of bedtime.) For SBP persistently <90. Must give with meals. Do not give withnin 4 hours of bedtime.  Yes Annette Saunders DO   pantoprazole (PROTONIX) 40 MG tablet Take 1 tablet by mouth in the morning and at bedtime Yes Annette Saunders DO   aspirin 81 MG chewable tablet Take 1 tablet by mouth daily Continue to hold for now and follow-up with your cardiologist and primary care provider regarding this medication.  Yes Annette Saunders,    apixaban (ELIQUIS) 2.5 MG TABS tablet Take 2.5 mg by mouth 2 times daily Yes Historical Provider, MD   atorvastatin (LIPITOR) 80 MG tablet atorvastatin 80 mg tablet Yes Historical Provider, MD   polyethylene glycol (GLYCOLAX) 17 GM/SCOOP powder Take 17 g by mouth daily Yes Historical Provider, MD   traZODone (DESYREL) 50 MG tablet Take 50 mg by mouth nightly as needed Yes Historical Provider, MD   rifabutin (MYCOBUTIN) 150 MG capsule Take 300 mg by mouth daily Yes Historical Provider, MD   cephALEXin (KEFLEX) 500 MG capsule Take 500 mg by mouth every 24 hours To start 10/12  Historical Provider, MD   dorzolamide (TRUSOPT) 2 % ophthalmic solution INSTILL 1 DROP INTO LEFT EYE TWICE DAILY  Historical Provider, MD   ferrous sulfate (IRON 325) 325 (65 Fe) MG tablet Take 325 mg by mouth daily (with breakfast)  Historical Provider, MD   timolol (TIMOPTIC) 0.5 % ophthalmic solution INSTILL 1 DROP INTO LEFT EYE TWICE DAILY  Historical Provider, MD   allopurinol (ZYLOPRIM) 300 MG tablet Take 300 mg by mouth daily  Patient not taking: Reported on 10/11/2022  Historical Provider, MD   Multiple Vitamins-Minerals (THERAPEUTIC MULTIVITAMIN-MINERALS) tablet Take 1 tablet by mouth daily  Historical Provider, MD        Hospital Medications:  Current Facility-Administered Medications: 0.9 % sodium chloride infusion, , IntraVENous, PRN  0.9 % sodium chloride infusion, , IntraVENous, Once  midodrine (PROAMATINE) tablet 10 mg, 10 mg, Oral, TID WC  melatonin disintegrating tablet 5 mg, 5 mg, Oral, Nightly  0.9 % sodium chloride infusion, , IntraVENous, PRN  pantoprazole (PROTONIX) 80 mg in sodium chloride 0.9 % 100 mL infusion, 8 mg/hr, IntraVENous, Continuous  epoetin destiny-epbx (RETACRIT) injection 10,000 Units, 10,000 Units, IntraVENous, Once per day on [facility]  albumin human 25 % IV solution 25 g, 25 g, IntraVENous, PRN  heparin (porcine) injection 4,500 Units, 4,500 Units, IntraCATHeter, PRN  ferrous sulfate (IRON 325) tablet 325 mg, 325 mg, Oral, Daily with breakfast  Venelex ointment, , Topical, BID  collagenase ointment, , Topical, Daily  rifabutin (MYCOBUTIN) capsule 300 mg (Patient Supplied), 300 mg, Oral, Daily  sodium chloride flush 0.9 % injection 5-40 mL, 5-40 mL, IntraVENous, 2 times per day  sodium chloride flush 0.9 % injection 5-40 mL, 5-40 mL, IntraVENous, PRN  0.9 % sodium chloride infusion, , IntraVENous, PRN  ondansetron (ZOFRAN-ODT) disintegrating tablet 4 mg, 4 mg, Oral, Q8H PRN **OR** ondansetron (ZOFRAN) injection 4 mg, 4 mg, IntraVENous, Q6H PRN  polyethylene glycol (GLYCOLAX) packet 17 g, 17 g, Oral, Daily PRN  acetaminophen (TYLENOL) tablet 650 mg, 650 mg, Oral, Q6H PRN **OR** acetaminophen (TYLENOL) suppository 650 mg, 650 mg, Rectal, Q6H PRN  cephALEXin (KEFLEX) capsule 500 mg, 500 mg, Oral, Daily  [Held by provider] apixaban (ELIQUIS) tablet 2.5 mg, 2.5 mg, Oral, BID  polyethylene glycol (GLYCOLAX) packet 17 g, 17 g, Oral, Daily  timolol (TIMOPTIC) 0.25 % ophthalmic solution 1 drop, 1 drop, Left Eye, BID  dorzolamide (TRUSOPT) 2 % ophthalmic solution 1 drop, 1 drop, Left Eye, TID  traZODone (DESYREL) tablet 50 mg, 50 mg, Oral, Nightly  [Held by provider] aspirin chewable tablet 81 mg, 81 mg, Oral, Daily  atorvastatin (LIPITOR) tablet 80 mg, 80 mg, Oral, Nightly     Social History:   Social History       Tobacco History       Smoking Status  Never      Smokeless Tobacco Use  Never              Alcohol History       Alcohol Use Status  Not Currently              Drug Use       Drug Use Status  Never              Sexual Activity       Sexually Active  Not Asked                     Family History:  History reviewed. No pertinent family history.      Allergies:  No Known Allergies     ROS:   General: No fever or weight change  Hematologic: No unexpected submucosal bleeding or bruising  HEENT: No sore throat or facial pain  Respiratory: No cough or dyspnea  Cardiovascular: No angina or dependent edema  Gastrointestinal: See HPI  Musculoskeletal: No usual joint pain or stiffness  Skin: No skin eruptions or changing lesions  Neurologic: No focal weakness or numbness  Psychiatric: No anxiety or sleep disturbance    Physical Exam:  Vital Signs:   Vitals:    10/16/22 1402   BP: (!) 106/58   Pulse: 89   Resp: 16   Temp: 97.6 °F (36.4 °C)   SpO2: 96%       General: Elderly patient, bedbound  HEENT: Sclera anicteric, mucosal membranes moist  Cardiovascular: Regular rate and rhythm. No murmurs. Respiratory: Respirations nonlabored, no crepitus  GI: Abdomen nondistended, soft, and nontender. Normal active bowel sounds. No masses palpable. Rectal: Deferred  Musculoskeletal: No pitting edema of the lower legs. Neurological: Gross memory appears intact. Patient is alert and oriented      Recent Imaging:   CT ABDOMEN PELVIS WO CONTRAST Additional Contrast? None  Narrative: EXAM: CT ABDOMEN PELVIS WO CONTRAST    INDICATION: Acute anemia, no signs of bleeding    COMPARISON: None. TECHNIQUE: Helically-acquired axial images were obtained through the abdomen and pelvis. Multiplanar reformats were reconstructed. Up-to-date CT equipment and radiation dose reduction techniques were employed. Evaluation of the solid organs is limited without IV contrast.    Oral Contrast: None. FINDINGS:    LOWER CHEST: Bilateral pleural effusions. Lower lobe opacities. The heart is prominent. LIVER: Normal morphology. Punctate internal calcific densities. GALLBLADDER AND BILIARY TREE: Gallbladder distended without pericholecystic inflammatory change. No intrahepatic or extrahepatic biliary dilatation. PANCREAS: No evidence of mass or inflammation. SPLEEN: Unremarkable.     ADRENAL GLANDS: Adrenal glands are normal.    KIDNEYS AND URETERS: No renal calculi or hydronephrosis. Bilateral renal cysts. GASTROINTESTINAL: No abnormal bowel wall thickening or obstruction. Metallic density within the duodenum. Appendix is normal.    VASCULATURE: Aorta is normal in caliber. LYMPH NODES: No pathologically enlarged lymph nodes. PERITONEUM/RETROPERITONEUM: No free air. Small amount of perihepatic and perisplenic ascites. PELVIC ORGANS AND BLADDER: Unremarkable. BODY WALL AND SOFT TISSUES: Unremarkable. BONES: No acute or suspicious abnormality. Multilevel degenerative disease. Impression: 1. No evidence of intra-abdominal bleeding. 2.  Bilateral lower lobe opacities, atelectasis versus pneumonia. 3.  Small bilateral pleural effusions. Labs:   Recent Labs     10/14/22  0651 10/15/22  0813 10/15/22  1401 10/15/22  1401 10/15/22  1710 10/15/22  1933 10/15/22  2312 10/16/22  0742 10/16/22  1257   HGB  --   --  6.5*   < > 5.6* 5.6* 5.8* 5.6* 5.9*   WBC  --   --  9.2  --  10.4  --   --   --   --    PROT  --   --   --   --   --   --   --  5.7*  --    LABALBU 3.1* 2.6* 2.5*  --   --   --   --  3.4  3.4  --    ALKPHOS  --   --   --   --   --   --   --  77  --    ALT  --   --   --   --   --   --   --  <5*  --    AST  --   --   --   --   --   --   --  24  --    BILITOT  --   --   --   --   --   --   --  0.8  --    BILIDIR  --   --   --   --   --   --   --  0.4*  --    IBILI  --   --   --   --   --   --   --  0.4  --     < > = values in this interval not displayed. Assessment:    30-year-old patient With ESRD on hemodialysis admitted with volume overload, GI consulted for acute drop in hemoglobin, no history of overt GI bleeding, no melena, hematemesis or bright red blood per rectum. Patient reports a similar episode recently, had an EGD and a colonoscopy at [address] [place] [address]: Will recommend upper endoscopy in a.m.    Abdominal imaging negative for retroperitoneal intra-abdominal bleed  Transfuse for H&H closer to 7 g/dL  N.p.o. at midnight  IV PPI        Mcconnell MD    [facility]    [phone].  Also available via Perfect Serve

## 2022-10-16 NOTE — PLAN OF CARE
Problem: Skin/Tissue Integrity  Goal: Absence of new skin breakdown  Description: 1. Monitor for areas of redness and/or skin breakdown  2. Assess vascular access sites hourly  3. Every 4-6 hours minimum:  Change oxygen saturation probe site  4. Every 4-6 hours:  If on nasal continuous positive airway pressure, respiratory therapy assess nares and determine need for appliance change or resting period.   Outcome: Progressing  Note: Turn every 2 hours

## 2022-10-16 NOTE — PROGRESS NOTES
Hospitalist Progress Note      PCP: Vivi Thompson MD    Date of Admission: 10/11/2022    Chief Complaint:  ESRD needing dialysis, volume overload    Subjective:     Patient drowsy this am, per family was awake all night talking, could not sleep. No specific complaints relayed, no signs of bleeding noted. Family reports he is prone to delirium and they believe this is developing. Medications:  Reviewed    Infusion Medications    sodium chloride      sodium chloride      sodium chloride      pantoprozole (PROTONIX) infusion 8 mg/hr (10/15/22 2043)    sodium chloride       Scheduled Medications    midodrine  10 mg Oral TID WC    melatonin  5 mg Oral Nightly    epoetin destiny-epbx  10,000 Units IntraVENous Once per day on Tue Thu Sat    ferrous sulfate  325 mg Oral Daily with breakfast    Venelex   Topical BID    collagenase   Topical Daily    rifabutin  300 mg Oral Daily    sodium chloride flush  5-40 mL IntraVENous 2 times per day    cephALEXin  500 mg Oral Daily    [Held by provider] apixaban  2.5 mg Oral BID    polyethylene glycol  17 g Oral Daily    timolol  1 drop Left Eye BID    dorzolamide  1 drop Left Eye TID    traZODone  50 mg Oral Nightly    [Held by provider] aspirin  81 mg Oral Daily    atorvastatin  80 mg Oral Nightly     PRN Meds: sodium chloride, sodium chloride, albumin human, heparin (porcine), sodium chloride flush, sodium chloride, ondansetron **OR** ondansetron, polyethylene glycol, acetaminophen **OR** acetaminophen      Intake/Output Summary (Last 24 hours) at 10/16/2022 1019  Last data filed at 10/16/2022 0410  Gross per 24 hour   Intake 406.92 ml   Output --   Net 406.92 ml         Exam:    BP (!) 87/41   Pulse (!) 103   Temp 98.7 °F (37.1 °C)   Resp 18   Ht 5' 9\" (1.753 m)   Wt 189 lb 6 oz (85.9 kg)   SpO2 94%   BMI 27.97 kg/m²     General appearance: No apparent distress, appears stated age and cooperative. HEENT: Pupils equal, round, and reactive to light. Conjunctivae/corneas clear. Neck: Supple, with full range of motion. No jugular venous distention. Trachea midline. Respiratory:  Normal respiratory effort. Clear to auscultation, bilaterally without Rales/Wheezes/Rhonchi. Cardiovascular: Regular rate and rhythm with normal S1/S2 without murmurs, rubs or gallops. Abdomen: Soft, non-tender, non-distended with normal bowel sounds. Musculoskelatal: No clubbing, cyanosis or edema bilaterally. Skin: Skin color, texture, turgor normal.  No rashes or lesions. Neurologic:  Cranial nerves: II-XII intact, grossly non-focal.  Psychiatric: Alert and oriented, thought content appropriate, normal insight    Labs:   Recent Labs     10/15/22  1401 10/15/22  1710 10/15/22  1933 10/15/22  2312 10/16/22  0742   WBC 9.2 10.4  --   --   --    HGB 6.5* 5.6* 5.6* 5.8* 5.6*   HCT 20.1* 17.0* 17.2*  17.4* 17.6* 17.5*    157  --   --   --        Recent Labs     10/15/22  0813 10/15/22  1401 10/16/22  0742    139 140   K 4.0 4.0 3.8    104 102   CO2 27 27 28   BUN 54* 60* 31*   CREATININE 1.7* 1.7* 1.3   CALCIUM 8.3 8.5 8.6   PHOS 1.1* 1.4* 1.1*       No results for input(s): AST, ALT, BILIDIR, BILITOT, ALKPHOS in the last 72 hours. No results for input(s): INR in the last 72 hours. No results for input(s): Nash Arshad in the last 72 hours. Studies:  CT ABDOMEN PELVIS WO CONTRAST Additional Contrast? None   Final Result   1. No evidence of intra-abdominal bleeding. 2.  Bilateral lower lobe opacities, atelectasis versus pneumonia. 3.  Small bilateral pleural effusions. XR CHEST PORTABLE   Final Result      Mild perihilar opacity with pulmonary vascular indistinctness may represent mild pulmonary edema. Bandlike opacity in the right midlung may represent atelectasis. Cardiomegaly. Assessment/Plan:     This is a 81 yo male with complex medical history who presented for hemodialysis due to lack of dialysis transport, has subsequently developed acute anemia. 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    CAD, hx of STEMI  statin, Eliquis 2.5 mg BID  Per records appears is off ASA 81 mg - stopped (received 2 doses)     Hx of MSSA bacteremia, endocarditis  Refused explant  Ancef 2 g 10/12  Transitioned to Keflex per med/rec, chart review of ID recs  Rifabutin home dose  Will need follow-up with UC ID     Stage 3 wound on buttocks  Wound care consulted    High risk for delirium w/hx of insomnia  Delirium precautions ordered however will need frequent H&H checks  Discussed delirium precautions with family at bedside    Acute on chronic anemia  Ferritin elevated  Iron and TIBC pending, LDH, haptoglobin, PT, PTT. Platelets ok on last check, repeat  Holding blood thinners  Stat CT abd/pelvis  Patient does have history of PUD earlier this summer. Family reports resolved. Check FOBT. GI consulted.   NPO  IV PPI for now  Transferred to PCU status  2 IVs  Repeat transfusion ordered, monitor volume carefully due to ESRD  CBC qd, H&H q6     DVT Prophylaxis: on Eliquis  PT/OT Eval Status: Ongoing  Diet: Diet NPO Exceptions are: Sips of Clear Liquids, Sips of Water with Meds  Code Status: Full Code    Dispo - Inpatient    Annette Saunders DO

## 2022-10-17 ENCOUNTER — ANESTHESIA (OUTPATIENT)
Dept: ENDOSCOPY | Age: 87
DRG: 640 | End: 2022-10-17
Payer: MEDICARE

## 2022-10-17 ENCOUNTER — ANESTHESIA EVENT (OUTPATIENT)
Dept: ENDOSCOPY | Age: 87
DRG: 640 | End: 2022-10-17
Payer: MEDICARE

## 2022-10-17 PROCEDURE — 6360000002 HC RX W HCPCS: Performed by: NURSE ANESTHETIST, CERTIFIED REGISTERED

## 2022-10-17 PROCEDURE — 2580000003 HC RX 258: Performed by: NURSE ANESTHETIST, CERTIFIED REGISTERED

## 2022-10-17 RX ORDER — PROPOFOL 10 MG/ML
INJECTION, EMULSION INTRAVENOUS PRN
Status: DISCONTINUED | OUTPATIENT
Start: 2022-10-17 | End: 2022-10-17 | Stop reason: SDUPTHER

## 2022-10-17 RX ORDER — SODIUM CHLORIDE 9 MG/ML
INJECTION, SOLUTION INTRAVENOUS CONTINUOUS PRN
Status: DISCONTINUED | OUTPATIENT
Start: 2022-10-17 | End: 2022-10-17 | Stop reason: SDUPTHER

## 2022-10-17 RX ORDER — PROPOFOL 10 MG/ML
INJECTION, EMULSION INTRAVENOUS CONTINUOUS PRN
Status: DISCONTINUED | OUTPATIENT
Start: 2022-10-17 | End: 2022-10-17 | Stop reason: SDUPTHER

## 2022-10-17 RX ADMIN — PROPOFOL 20 MG: 10 INJECTION, EMULSION INTRAVENOUS at 12:00

## 2022-10-17 RX ADMIN — SODIUM CHLORIDE: 9 INJECTION, SOLUTION INTRAVENOUS at 11:49

## 2022-10-17 RX ADMIN — PROPOFOL 150 MCG/KG/MIN: 10 INJECTION, EMULSION INTRAVENOUS at 12:00

## 2022-10-17 ASSESSMENT — LIFESTYLE VARIABLES: SMOKING_STATUS: 0

## 2022-10-17 NOTE — PROGRESS NOTES
Pt SBP improved to 90s after bolus but drop back to 84/39 at 0457. No other sx reported by pt.     0524 Dr. Janessa Teixeira notified, midodrine ordered to be given now and administered.      0646 BP 92/46

## 2022-10-17 NOTE — CONSULTS
[facility]  Palliative Medicine Consultation Note      Date Of Admission:10/11/2022  Date of consult: 10/17/22  Seen by [facility] in the past:  No    Recommendations:        Went to see pt at the bedside. He is drowsy following EGD, able to answer some questions appropriately. He denies complaints. His wife and daughter were at bedside, however preparing to leave because they have to be somewhere. Agreed to come back when they come back to the hospital this afternoon or tomorrow. 1. Goals of Care/Advanced Care planning/Code status: Full code, plan to discuss further when pt's wife and daughter when available. 2. Pain: pt denies  3. SOB: pt denies  4. ESRD needing dialysis: pt getting dialysis inpatient, CM working on setting up dialysis outpatient. 5. Anemia likely 2/2 GIB: pt underwent scope today, no active bleeding at this time. 6. Disposition: Likely [facility] operated by [facility] when medically ready for discharge    Reason for Consult:         [x]  Goals of Care  [x]  Code Status Discussion/Advanced Care Planning   [x]  Psychosocial/Family Support  []  Symptom Management  []  Other (Specify)    Requesting Physician: Dr. Rosendo Escalante:  ESRD needing HD    History Obtained From:  patient, electronic medical record    History of Present Illness:         Roxi Stewart is a 82 y.o. male with PMH of STEMI 6./2022, tachy-federico syndrome s/p PPM 7/2022, Afib on Eliquis, systolic CHF EF 85-02%, aortic stenosis, recent MSSA bacteremia on Ancef who presented with inability to get dialysis. Reportedly his chair time was set up but his transportation was not. Of note, pt was recently admitted to  with MSSA bacteremia and endocarditis. Reportedly refused explant of pacemaker. He has since been transitioned to keflex. On 10/16 pt had a drop in hgb with black loose stool. GI was consulted, EGD planned for today 10/17. Subjective:         Past Medical History:    History reviewed.  No pertinent past medical history. Past Surgical History:    History reviewed. No pertinent surgical history. Current Medications:    Medications Prior to Admission: apixaban (ELIQUIS) 2.5 MG TABS tablet, Take 2.5 mg by mouth 2 times daily  atorvastatin (LIPITOR) 80 MG tablet, atorvastatin 80 mg tablet  polyethylene glycol (GLYCOLAX) 17 GM/SCOOP powder, Take 17 g by mouth daily  traZODone (DESYREL) 50 MG tablet, Take 50 mg by mouth nightly as needed  rifabutin (MYCOBUTIN) 150 MG capsule, Take 300 mg by mouth daily  cephALEXin (KEFLEX) 500 MG capsule, Take 500 mg by mouth every 24 hours To start 10/12  dorzolamide (TRUSOPT) 2 % ophthalmic solution, INSTILL 1 DROP INTO LEFT EYE TWICE DAILY  ferrous sulfate (IRON 325) 325 (65 Fe) MG tablet, Take 325 mg by mouth daily (with breakfast)  timolol (TIMOPTIC) 0.5 % ophthalmic solution, INSTILL 1 DROP INTO LEFT EYE TWICE DAILY  allopurinol (ZYLOPRIM) 300 MG tablet, Take 300 mg by mouth daily (Patient not taking: Reported on 10/11/2022)  Multiple Vitamins-Minerals (THERAPEUTIC MULTIVITAMIN-MINERALS) tablet, Take 1 tablet by mouth daily  [DISCONTINUED] amLODIPine (NORVASC) 10 MG tablet, Take 10 mg by mouth daily  [DISCONTINUED] furosemide (LASIX) 20 MG tablet, Take 20 mg by mouth daily  [DISCONTINUED] losartan (COZAAR) 50 MG tablet, Take 50 mg by mouth daily  [DISCONTINUED] simvastatin (ZOCOR) 10 MG tablet, Take 10 mg by mouth nightly  [DISCONTINUED] AZOPT 1 % ophthalmic suspension, Place 1-2 drops into the left eye 2 times daily Two drops in AM, 1 drop in PM  [DISCONTINUED] aspirin 81 MG chewable tablet, Take 81 mg by mouth daily    Allergies:  Patient has no known allergies. Social History:    TOBACCO: reports that he has never smoked. He has never used smokeless tobacco.  ETOH:   reports that he does not currently use alcohol.   Patient currently lives in a nursing home    Review of Systems -   Review of Systems: A 10 point review of systems was conducted, significant findings as notedin HPI. Objective:          Physical Exam  Constitutional:       General: He is not in acute distress. Cardiovascular:      Rate and Rhythm: Normal rate and regular rhythm. Heart sounds: Normal heart sounds. Pulmonary:      Breath sounds: Normal breath sounds. Abdominal:      General: Bowel sounds are normal.      Palpations: Abdomen is soft. Musculoskeletal:      Right lower leg: No edema. Left lower leg: No edema. Skin:     General: Skin is warm and dry. Neurological:      Mental Status: He is alert. Comments: Drowsy, oriented to person, place        Palliative Performance Scale:  [] 60% Ambulation reduced; Significant disease; Can't do hobbies/housework; intake normal or reduced; occasional assist; LOC full/confusion  [] 50% Mainly sit/lie; Extensive disease; Can't do any work; Considerable assist; intake normal  Or reduced; LOC full/confusion  [x] 40% Mainly in bed; Extensive disease; Mainly assist; intake normal or reduced; occasional assist; LOC full/confusion  [] 30% Bed Bound; Extensive disease; Total care; intake reduced; LOC full/confusion  [] 20% Bed Bound; Extensive disease; Total care; intake minimal; Drowsy/coma  [] 10% Bed Bound; Extensive disease;  Total care; Mouth care only; Drowsy/coma  [] 0% Death    PPS: 40    Vitals:    BP (!) 87/48   Pulse 86   Temp 97.3 °F (36.3 °C) (Oral)   Resp 17   Ht 5' 9\" (1.753 m)   Wt 204 lb 2.3 oz (92.6 kg)   SpO2 98%   BMI 30.15 kg/m²     Labs:    BMP:   Recent Labs     10/15/22  1401 10/16/22  0742 10/17/22  0635    140 136   K 4.0 3.8 4.1    102 100   CO2 27 28 25   BUN 60* 31* 42*   CREATININE 1.7* 1.3 1.8*   GLUCOSE 105* 87 91     CBC:   Recent Labs     10/15/22  1401 10/15/22  1710 10/15/22  1933 10/16/22  0742 10/16/22  1257 10/16/22  2150 10/17/22  0635 10/17/22  0710   WBC 9.2 10.4  --   --   --   --  8.2  --    HGB 6.5* 5.6*   < > 5.6*   < > 6.9* 7.3* 7.1*   HCT 20.1* 17.0*   < > 17.5*   < > 21.1* 21.5* 21.6*    157  --  147  --   --  92*  --     < > = values in this interval not displayed. LFT's:   Recent Labs     10/16/22  0742   AST 24   ALT <5*   BILITOT 0.8   ALKPHOS 77     Troponin: No results for input(s): TROPONINI in the last 72 hours. BNP: No results for input(s): BNP in the last 72 hours. ABGs: No results for input(s): PHART, ZYL7NBS, PO2ART in the last 72 hours. INR:   Recent Labs     10/17/22  0710   INR 2.50*       U/A:No results for input(s): NITRITE, COLORU, PHUR, LABCAST, WBCUA, RBCUA, MUCUS, TRICHOMONAS, YEAST, BACTERIA, CLARITYU, SPECGRAV, LEUKOCYTESUR, UROBILINOGEN, BILIRUBINUR, BLOODU, GLUCOSEU, AMORPHOUS in the last 72 hours. Invalid input(s): KETONESU    CT ABDOMEN PELVIS WO CONTRAST Additional Contrast? None   Final Result   1. No evidence of intra-abdominal bleeding. 2.  Bilateral lower lobe opacities, atelectasis versus pneumonia. 3.  Small bilateral pleural effusions. XR CHEST PORTABLE   Final Result      Mild perihilar opacity with pulmonary vascular indistinctness may represent mild pulmonary edema. Bandlike opacity in the right midlung may represent atelectasis. Cardiomegaly. Conclusion/Time spent:         Recommendations see above    Time spent with patient and/or family: 10  Time reviewing records: 10 min   Time communicating with staff: 5 min     A total of 25 minutes spent with the patient and family on unit greater than 50% in counseling regarding palliative care and in goals of care for the patient. Thank you to Dr. Prince Baker for this consultation. We will continue to follow Mr. Cummings's care as needed.     [address] [facility]  Inpatient Palliative Care  [phone]

## 2022-10-17 NOTE — PLAN OF CARE
Problem: Pain  Goal: Verbalizes/displays adequate comfort level or baseline comfort level  Outcome: Progressing     Problem: Skin/Tissue Integrity  Goal: Absence of new skin breakdown  Description: 1. Monitor for areas of redness and/or skin breakdown  2. Assess vascular access sites hourly  3. Every 4-6 hours minimum:  Change oxygen saturation probe site  4. Every 4-6 hours:  If on nasal continuous positive airway pressure, respiratory therapy assess nares and determine need for appliance change or resting period.   10/17/2022 1558 by Ryan Gomes RN  Outcome: Progressing     Problem: ABCDS Injury Assessment  Goal: Absence of physical injury  10/17/2022 1558 by Ryan Gomes RN  Outcome: Progressing  Flowsheets (Taken 10/17/2022 0257 by Mala Mccormick RN)  Absence of Physical Injury: Implement safety measures based on patient assessment

## 2022-10-17 NOTE — PROGRESS NOTES
Lab unable to draw blue tap for PT, INR as pt is a hard stick, other labs successfully drawn and are awaiting results. Will let day shift nurse know.

## 2022-10-17 NOTE — ANESTHESIA POSTPROCEDURE EVALUATION
Department of Anesthesiology  Postprocedure Note    Patient: Tawana Obregon  MRN: [id number]  YOB: 1935  Date of evaluation: 10/17/2022      Procedure Summary     Date: 10/17/22 Room / Location: [facility]    Anesthesia Start: 9050 Anesthesia Stop: 3217    Procedure: EGD BIOPSY Diagnosis:       Melena      (Melena [K92.1])    Surgeons: Marlen Gauthier MD Responsible Provider: Carlie Cortes DO    Anesthesia Type: MAC ASA Status: 4          Anesthesia Type: No value filed.     Brigid Phase I: Brigid Score: 9    Brigid Phase II: Brigid Score: 10      Anesthesia Post Evaluation    Patient location during evaluation: PACU  Patient participation: complete - patient participated  Level of consciousness: awake and alert  Airway patency: patent  Nausea & Vomiting: no vomiting and no nausea  Cardiovascular status: blood pressure returned to baseline  Respiratory status: acceptable  Hydration status: euvolemic

## 2022-10-17 NOTE — PROCEDURES
EGD PROCEDURE NOTE                                                    Patient: Isabelle Kerns MRN: [id number]     YOB: 1935  Age: 82 y.o. Sex: male    Unit: [address] [place] [address] ENDOSCOPY Room/Bed: Endo Pool/NONE Location: [address] [place] [address]       Admitting Physician: Reno Byrnes    Primary Care Physician: Uvaldo Pride MD      Referring  Physician:      Facility:   [address] [place] [address] [Inpatient]  : Ana Holley MD      PROCEDURE:  Esophagogastroduodenoscopy with biopsy  Procedure(s):  EGD BIOPSY    Preoperative Diagnosis:   Melena [K92.1]    Post Operative Diagnosis:  Same as documented in Diagnosis    INSTRUMENT:  Olympus  Gastroscope    HISTORY & PHYSICAL:  Patient's history, physical, medications, allergies, labs reviewed prior to procedure. Indication:   Same as the preop diagnosis. AllergiesPatient has no known allergies. Allergies noted: YES  Vital signs reviewed: YES    ASA: No  found with the name: Terrell Pale:  MAC   see nurse documentation for details    Patient in acceptable condition for procedure:YES  Written informed consent obtained from  patient. . Risks (including but not limited to perforation, bloating, infection, perforation  and bleeding adverse drug reaction missed lesions and aspiration during the procedure requiring medical or surgical management) benefits and alternatives explained and questions answered. The  patient. verbalized understanding. A timeoout procedure was performed. Based on the pre-procedure assessment, including review of the patient's medical history, medications, allergies, and review of systems, patient had been deemed to be an appropriate candidate for sedation as planned above. Patient was therefore sedated with the medications listed above.  Patient was monitored continuously with electrocardiogram tracing, pulse oximetry, blood pressure monitoring, and direct visualization. Under continue IV sedation with close monitoring, endoscope passed in to the 3rd portion of the duodenum by direct visualization. POST OPERATIVE FINDINGS:   ESOPHAGUS: Diaphragmatic hiatus was 40 cm from incisors and GE junction (upper margin of gastric folds) was at 40 cm from incisors. Squamocolumnar junction was at 40 cm from incisors. Mucosa appeared normal.  STOMACH:  pyloric channel ulcer with intact clip from recent EGD. No active bleeding identified after thorough irrigation at the clip site. , antrum revealed gastritis,  Body, fundus and cardia, including retroflexed views revealed mild to moderate diffuse gastritis. .    Gastric antral and body of stomach Bx done to  r/o H. Pylori gastritis. DUODENUM: Duodenal bulb  normal. Descending portion of the duodenum appeared  normal.         The patient was then decompressed. Scope was then withdrawn. Patient tolerated procedure well. DIAGNOSIS:   1) small pyloric channel ulcer (5 mm) with intact clip in place from recent EGD. 2) no active upper GI bleeding source /traces of fresh blood identified on EGD. 3) Moderate diffuse gastritis. RECOMMENDATIONS:   1) Protonix 40 mg twice daily for 3 months. 2) Await Pathology results in next 7 days  3) Avoid pain medications like Motrin/Advil/ibuprofen/Aleve/naproxen or similar NSAIDs    4) F/U with In pt GI team as needed       Gastric or Duodenal ulcer present: yes  Biopsy done to rule out Helicobacter pylori infection:  yes  [facility] PATH SPECIMEN SENT:  yes  PHOTOGRAPH TAKEN:  yes In MD Reports     COMPLICATIONS DURING PROCEDURE:   None      EBL: None  . DISPOSITION: The patient was sent to the recovery room in stable condition. CC: Primary Care/Referring Physician(s): Lia Chaparro MD  Patient meets criteria for discharge/transfer:  yes  Results and plan discussed with the patient in detail.     Signed By:   Jamie San MD  [facility]  12:08 PM 10/17/2022       \"Please note that some or all of this record was generated using voice recognition software. If there are any questions about the content of this document, please contact the author as some errors of transcription may have occurred. \"

## 2022-10-17 NOTE — ANESTHESIA PRE PROCEDURE
Department of Anesthesiology  Preprocedure Note       Name:  Andres Search   Age:  82 y.o.  :  1935                                          MRN:  [id number]         Date:  10/17/2022      Surgeon: Alma Aggarwal):  Sharon Holt MD    Procedure: Procedure(s):  EGD DIAGNOSTIC ONLY    Medications prior to admission:   Prior to Admission medications    Medication Sig Start Date End Date Taking? Authorizing Provider   collagenase 250 UNIT/GM ointment Apply topically daily. 10/14/22 10/23/22 Yes Annette Saunders DO   Balsam Peru-Castor Oil (VENELEX) OINT ointment Apply topically 2 times daily 10/13/22 11/12/22 Yes Annette Saunders DO   midodrine (PROAMATINE) 10 MG tablet Take 1 tablet by mouth 3 times daily as needed (For SBP persistently <90. Must give with meals. Do not give withnin 4 hours of bedtime.) For SBP persistently <90. Must give with meals. Do not give withnin 4 hours of bedtime. 10/13/22 11/12/22 Yes Annette Saunders DO   pantoprazole (PROTONIX) 40 MG tablet Take 1 tablet by mouth in the morning and at bedtime 10/13/22  Yes Annette Saunders DO   aspirin 81 MG chewable tablet Take 1 tablet by mouth daily Continue to hold for now and follow-up with your cardiologist and primary care provider regarding this medication.  10/13/22  Yes Annette Saunders DO   apixaban (ELIQUIS) 2.5 MG TABS tablet Take 2.5 mg by mouth 2 times daily 10/3/22  Yes Historical Provider, MD   atorvastatin (LIPITOR) 80 MG tablet atorvastatin 80 mg tablet 22  Yes Historical Provider, MD   polyethylene glycol (GLYCOLAX) 17 GM/SCOOP powder Take 17 g by mouth daily 22  Yes Historical Provider, MD   traZODone (DESYREL) 50 MG tablet Take 50 mg by mouth nightly as needed 22  Yes Historical Provider, MD   rifabutin (MYCOBUTIN) 150 MG capsule Take 300 mg by mouth daily 10/8/22 11/19/22 Yes Historical Provider, MD   cephALEXin (KEFLEX) 500 MG capsule Take 500 mg by mouth every 24 hours To start 10/12 10/12/22   Historical Provider, MD   dorzolamide (TRUSOPT) 2 % ophthalmic solution INSTILL 1 DROP INTO LEFT EYE TWICE DAILY 7/15/22   Historical Provider, MD   ferrous sulfate (IRON 325) 325 (65 Fe) MG tablet Take 325 mg by mouth daily (with breakfast)    Historical Provider, MD   timolol (TIMOPTIC) 0.5 % ophthalmic solution INSTILL 1 DROP INTO LEFT EYE TWICE DAILY 7/15/22   Historical Provider, MD   allopurinol (ZYLOPRIM) 300 MG tablet Take 300 mg by mouth daily  Patient not taking: Reported on 10/11/2022 12/5/17   Historical Provider, MD   Multiple Vitamins-Minerals (THERAPEUTIC MULTIVITAMIN-MINERALS) tablet Take 1 tablet by mouth daily    Historical Provider, MD       Current medications:    Current Facility-Administered Medications   Medication Dose Route Frequency Provider Last Rate Last Admin    sodium phosphate 10 mmol in sodium chloride 0.9 % 250 mL IVPB  10 mmol IntraVENous Once [address], MD        heparin flush 100 UNIT/ML injection 100 Units  100 Units IntraCATHeter PRN Kamille Ku MD        0.9 % sodium chloride infusion   IntraVENous PRN Rohan Phillipsy, DO        midodrine (PROAMATINE) tablet 10 mg  10 mg Oral TID  [address], MD   10 mg at 10/17/22 0529    melatonin disintegrating tablet 5 mg  5 mg Oral Nightly Annette Black, DO   5 mg at 10/16/22 2307    medicated lip ointment (BLISTEX)   Topical PRN Rohan Christopher, DO   7.5 g at 10/16/22 2307    0.9 % sodium chloride infusion   IntraVENous PRN Annette Black, DO        pantoprazole (PROTONIX) 80 mg in sodium chloride 0.9 % 100 mL infusion  8 mg/hr IntraVENous Continuous Annette Black, DO 10 mL/hr at 10/17/22 0146 8 mg/hr at 10/17/22 0146    epoetin destiny-epbx (RETACRIT) injection 10,000 Units  10,000 Units IntraVENous Once per day on Tue Thu Sat [address], MD   10,000 Units at 10/15/22 1731    albumin human 25 % IV solution 25 g  25 g IntraVENous PRN Gregory Ochoa MD   Stopped at 10/15/22 2330    heparin (porcine) injection 4,500 Units  4,500 Units IntraCATHeter PRN Gretchen Massey MD        ferrous sulfate (IRON 325) tablet 325 mg  325 mg Oral Daily with breakfast Natalio Mast, DO   325 mg at 10/17/22 0945    Venelex ointment   Topical BID Natalio Mast, DO   Given at 10/17/22 0950    collagenase ointment   Topical Daily Natalio Mast, DO   Given at 10/17/22 0950    rifabutin (MYCOBUTIN) capsule 300 mg (Patient Supplied)  300 mg Oral Daily Annette Black, DO   300 mg at 10/17/22 0947    sodium chloride flush 0.9 % injection 5-40 mL  5-40 mL IntraVENous 2 times per day Natalio Mast, DO   10 mL at 10/17/22 0949    sodium chloride flush 0.9 % injection 5-40 mL  5-40 mL IntraVENous PRN Annette Black, DO        0.9 % sodium chloride infusion   IntraVENous PRN Annette Black, DO        ondansetron (ZOFRAN-ODT) disintegrating tablet 4 mg  4 mg Oral Q8H PRN Annette Black, DO        Or    ondansetron (ZOFRAN) injection 4 mg  4 mg IntraVENous Q6H PRN Annette Black, DO        polyethylene glycol (GLYCOLAX) packet 17 g  17 g Oral Daily PRN Natalio Mast, DO        acetaminophen (TYLENOL) tablet 650 mg  650 mg Oral Q6H PRN Annette Black, DO   650 mg at 10/16/22 0818    Or    acetaminophen (TYLENOL) suppository 650 mg  650 mg Rectal Q6H PRN Natalio Mast, DO        cephALEXin (KEFLEX) capsule 500 mg  500 mg Oral Daily Annette Black, DO   500 mg at 10/17/22 0945    [Held by provider] apixaban (ELIQUIS) tablet 2.5 mg  2.5 mg Oral BID Annette Black, DO   2.5 mg at 10/15/22 0743    polyethylene glycol (GLYCOLAX) packet 17 g  17 g Oral Daily Annette Black, DO   17 g at 10/16/22 0820    timolol (TIMOPTIC) 0.25 % ophthalmic solution 1 drop  1 drop Left Eye BID Annette Black, DO   1 drop at 10/17/22 0946    dorzolamide (TRUSOPT) 2 % ophthalmic solution 1 drop  1 drop Left Eye TID Natalio Mast, DO   1 drop at 10/17/22 0946    traZODone (DESYREL) tablet 50 mg  50 mg Oral Nightly Annette Black, DO   50 mg at 10/16/22 2307    [Held by provider] aspirin chewable tablet 81 mg  81 mg Oral Daily Natalio Chambers DO 81 mg at 10/13/22 1301    atorvastatin (LIPITOR) tablet 80 mg  80 mg Oral Nightly Annette Black, DO   80 mg at 10/16/22 2307       Allergies:  No Known Allergies    Problem List:    Patient Active Problem List   Diagnosis Code    [facility] N18.6, Z99.2       Past Medical History:  History reviewed. No pertinent past medical history. Past Surgical History:  History reviewed. No pertinent surgical history. Social History:    Social History     Tobacco Use    Smoking status: Never    Smokeless tobacco: Never   Substance Use Topics    Alcohol use: Not Currently                                Counseling given: Not Answered      Vital Signs (Current):   Vitals:    10/17/22 0646 10/17/22 0811 10/17/22 0847 10/17/22 0940   BP: (!) 92/46 (!) 87/48 (!) 91/53 (!) 100/54   Pulse:  86     Resp:  17     Temp:  97.3 °F (36.3 °C)     TempSrc:  Oral     SpO2:  98%     Weight:       Height:                                                  BP Readings from Last 3 Encounters:   10/17/22 (!) 100/54   03/07/18 132/76       NPO Status:                                                                                 BMI:   Wt Readings from Last 3 Encounters:   10/17/22 204 lb 2.3 oz (92.6 kg)   03/07/18 245 lb 2.4 oz (111.2 kg)     Body mass index is 30.15 kg/m².     CBC:   Lab Results   Component Value Date/Time    WBC 8.2 10/17/2022 06:35 AM    RBC 2.55 10/17/2022 06:35 AM    HGB 7.1 10/17/2022 07:10 AM    HCT 21.6 10/17/2022 07:10 AM    MCV 84.2 10/17/2022 06:35 AM    RDW 20.1 10/17/2022 06:35 AM    PLT 92 10/17/2022 06:35 AM       CMP:   Lab Results   Component Value Date/Time     10/17/2022 06:35 AM    K 4.1 10/17/2022 06:35 AM    K 3.7 10/12/2022 06:34 AM     10/17/2022 06:35 AM    CO2 25 10/17/2022 06:35 AM    BUN 42 10/17/2022 06:35 AM    CREATININE 1.8 10/17/2022 06:35 AM    GFRAA 44 10/17/2022 06:35 AM    AGRATIO 1.1 03/07/2018 11:59 AM    LABGLOM 36 10/17/2022 06:35 AM    GLUCOSE 91 10/17/2022 06:35 AM    PROT 5.7 10/16/2022 07:42 AM    CALCIUM 8.2 10/17/2022 06:35 AM    BILITOT 0.8 10/16/2022 07:42 AM    ALKPHOS 77 10/16/2022 07:42 AM    AST 24 10/16/2022 07:42 AM    ALT <5 10/16/2022 07:42 AM       POC Tests: No results for input(s): POCGLU, POCNA, POCK, POCCL, POCBUN, POCHEMO, POCHCT in the last 72 hours. Coags:   Lab Results   Component Value Date/Time    PROTIME 27.1 10/17/2022 07:10 AM    INR 2.50 10/17/2022 07:10 AM    APTT 50.5 10/17/2022 07:10 AM       HCG (If Applicable): No results found for: PREGTESTUR, PREGSERUM, HCG, HCGQUANT     ABGs: No results found for: PHART, PO2ART, MFR3JYK, RSI1FSC, BEART, F6MVDMKY     Type & Screen (If Applicable):  No results found for: LABABO, LABRH    Drug/Infectious Status (If Applicable):  No results found for: HIV, HEPCAB    COVID-19 Screening (If Applicable): No results found for: COVID19        Anesthesia Evaluation  Patient summary reviewed and Nursing notes reviewed no history of anesthetic complications:   Airway: Mallampati: II  TM distance: >3 FB   Neck ROM: limited  Mouth opening: > = 3 FB   Dental:      Comment: Few remaining teeth    Pulmonary:       (-) not a current smoker                           Cardiovascular:  Exercise tolerance: poor (<4 METS),   (+) hyperlipidemia          Rate: normal                 ROS comment:   9/2022  Left ventricle: The cavity size is normal. There is mild    concentric hypertrophy. Systolic function was severely reduced.  The calculated ejection fraction was in the range of 25% to 30%. Moderate hypokinesis of the anteroseptal and anterior myocardium. Akinesis of the mid-apicalanterolateral, inferoseptal, and apical  myocardium. Neuro/Psych:               GI/Hepatic/Renal:   (+) renal disease: ESRD and dialysis,           Endo/Other:    (+) blood dyscrasia: anticoagulation therapy and anemia:., .                 Abdominal:             Vascular:           Other Findings:           Anesthesia Plan      MAC

## 2022-10-17 NOTE — H&P
GI Endoscopy Outpatient Procedure H&P    Patient: Talib Perez MRN: [id number]     YOB: 1935  Age: 82 y.o. Sex: male    Unit: [address] [place] [address] 4 U Room/Bed: [facility]/4301-01 Location: [address] [place] [address]     Referring  Physician: Rajani Bautista MD    Talib Perez is a 82 y.o. male  here for following procedure:    PROCEDURE:    Procedure(s):  EGD DIAGNOSTIC ONLY    PRE OPERATIVE DIAGNOSIS:   Melena [K92.1]  H/o PUD    INDICATIONS:   Same as the preop diagnosis. Pt seen and examined. H& P reviewed. History Obtained From:  patient and chart reviewed. Pt poor historian  82year-old patient with history of chronic renal disease on hemodialysis admitted with volume overload requiring dialysis. Patient is on chronic anticoagulation With Eliquis for history of DVT, history of endocarditis. On IV antibiotics for prior history of endocarditis and sacral decub. Admitted for dialysis  Has history of heart failure  GI consulted for acute drop in hemoglobin but there is no history of overt GI bleeding. Anticoagulation on hold. Patient reports work-up for anemia at [facility] recently, history of a gastric ulcer, negative colonoscopy. PMH, PSH , Allergies, Medications reviewed. History reviewed. No pertinent past medical history. History reviewed. No pertinent surgical history. Allergies: Patient has no known allergies. Allergies noted: Yes     Medications reviewed. No current facility-administered medications on file prior to encounter.      Current Outpatient Medications on File Prior to Encounter   Medication Sig Dispense Refill    apixaban (ELIQUIS) 2.5 MG TABS tablet Take 2.5 mg by mouth 2 times daily      atorvastatin (LIPITOR) 80 MG tablet atorvastatin 80 mg tablet      polyethylene glycol (GLYCOLAX) 17 GM/SCOOP powder Take 17 g by mouth daily      traZODone (DESYREL) 50 MG tablet Take 50 mg by mouth nightly as needed      rifabutin (MYCOBUTIN) 150 MG capsule Take 300 mg by mouth daily      cephALEXin (KEFLEX) 500 MG capsule Take 500 mg by mouth every 24 hours To start 10/12      dorzolamide (TRUSOPT) 2 % ophthalmic solution INSTILL 1 DROP INTO LEFT EYE TWICE DAILY      ferrous sulfate (IRON 325) 325 (65 Fe) MG tablet Take 325 mg by mouth daily (with breakfast)      timolol (TIMOPTIC) 0.5 % ophthalmic solution INSTILL 1 DROP INTO LEFT EYE TWICE DAILY      allopurinol (ZYLOPRIM) 300 MG tablet Take 300 mg by mouth daily (Patient not taking: Reported on 10/11/2022)      Multiple Vitamins-Minerals (THERAPEUTIC MULTIVITAMIN-MINERALS) tablet Take 1 tablet by mouth daily           Social History:  Social History     Tobacco Use    Smoking status: Never    Smokeless tobacco: Never   Substance Use Topics    Alcohol use: Not Currently         Family History:   History reviewed. No pertinent family history. PHYSICAL EXAM:     VITAL SIGNS   BP (!) 100/54   Pulse 86   Temp 97.3 °F (36.3 °C) (Oral)   Resp 17   Ht 5' 9\" (1.753 m)   Wt 204 lb 2.3 oz (92.6 kg)   SpO2 98%   BMI 30.15 kg/m²  I     Height Height: 5' 9\" (175.3 cm)   Weight Weight: 204 lb 2.3 oz (92.6 kg)   BMI Body mass index is 30.15 kg/m². Vital signs reviewed:Yes  see nurse documentation as well for details    General appearance:   No Acute distress   Lungs: Good diaphragmatic excursion. No use of accessory muscles of respiration noted. Heart:   RRR on monitor  Abdomen:  Soft, nontender, nondistended. Extremities:   Edema : yes  Neuro:   No focal deficits.    Alert and oriented      LABS   Basic Metabolic Profile Lab Results   Component Value Date/Time     10/17/2022 06:35 AM    K 4.1 10/17/2022 06:35 AM    K 3.7 10/12/2022 06:34 AM     10/17/2022 06:35 AM    CO2 25 10/17/2022 06:35 AM    BUN 42 10/17/2022 06:35 AM    CREATININE 1.8 10/17/2022 06:35 AM    GLUCOSE 91 10/17/2022 06:35 AM    PHOS 1.7 10/17/2022 06:35 AM      Complete Blood Count Lab Results   Component Value Date    WBC 8.2 10/17/2022 HGB 7.1 (L) 10/17/2022    HGB 7.3 (L) 10/17/2022    HCT 21.6 (L) 10/17/2022    HCT 21.5 (L) 10/17/2022    PLT 92 (L) 10/17/2022      Coagulation Studies Lab Results   Component Value Date    INR 2.50 (H) 10/17/2022        No results found for this or any previous visit. ASA Grade:  ASA 4 - Patient with severe systemic disease that is a constant threat to life   Per Anesthesia  Mallampati Score: II   Per Anesthesia      ASSESSMENT AND PLAN:    1. Patient is a 82 y.o. male with above specified procedure planned   MAC  with anesthesia  2. Procedure options, risks and benefits and alternatives available for the procedure with possible intervention  reviewed with patient. Patient expresses understanding and informed consent obtained prior to the procedure. .    Patient in acceptable condition for procedure:  Yes    Rosmery Escalante MD  10:16 AM 10/17/2022

## 2022-10-17 NOTE — PROGRESS NOTES
0045 Noted pt's BP 84/37. Pt does not report pain, dizziness or other sx. Paged on call hospitalist, Dr. Ranjit Almazan. Ordered NSS 250ml over 2 hrs infusion. 0236 Latest BP 91/51    Will continue to monitor.

## 2022-10-17 NOTE — CARE COORDINATION
Case Management Assessment           Daily Note                 Date/ Time of Note: 10/17/2022 10:32 AM         Note completed by: Chris Headley RN    Patient Name: Lilibeth Escalante  YOB: 1935    Diagnosis:ESRD needing dialysis ([facility] 75.) [N18.6, Z99.2]  Patient Admission Status: Inpatient    Date of Admission:10/11/2022 10:56 AM Length of Stay: 6 GLOS: GMLOS: 3.4    Current Plan of Care: monitoring H/H, EGD today, GI and nephro following  ________________________________________________________________________________________  PT AM-PAC: 6 / 24 per last evaluation on: 10.14.2022    OT AM-PAC: 12 / 24 per last evaluation on: 10.14.2022    DME Needs for discharge: deferred  ________________________________________________________________________________________  Discharge Plan: SNF: [facility].    Tentative discharge date: 10/18/22     Current barriers to discharge: EGD today, medical stability    Referrals completed: Not Applicable    Resources/ information provided: Not indicated at this time  ________________________________________________________________________________________  Case Management Notes: CM continues to follow for DC planning and needs. Patient to have EGD today with GI, continuing to monitor H/H for stability. Patient continues with HD per orders. Patient has been accepted to SNF at [facility], pre-cert through [employer] valid through 10/18/22. Patient awaiting approval from in-house HD company at [facility]. CM will continue to follow and assist with DC planning and needs. Sheila Roberts and his family were provided with choice of provider; he and his family are in agreement with the discharge plan.     Care Transition Patient: Jenn Headley RN  [facility] [employer]  Case Management Department  Ph: [phone]  Fax: [phone]

## 2022-10-17 NOTE — PROGRESS NOTES
Hospitalist Progress Note      PCP: Meena Barbosa MD    Date of Admission: 10/11/2022      Chief Complaint:  ESRD needing dialysis, volume overload      Subjective:   No specific complaints relayed, no signs of bleeding noted. Low Bps earlier today  Improved with fluid bolus      Went for EGD today: Findings reviewed        Medications:  Reviewed    Infusion Medications    sodium chloride      sodium chloride      pantoprozole (PROTONIX) infusion 8 mg/hr (10/17/22 0146)    sodium chloride       Scheduled Medications    sodium phosphate IVPB  10 mmol IntraVENous Once    midodrine  10 mg Oral TID WC    melatonin  5 mg Oral Nightly    epoetin destiny-epbx  10,000 Units IntraVENous Once per day on Tue Thu Sat    ferrous sulfate  325 mg Oral Daily with breakfast    Venelex   Topical BID    collagenase   Topical Daily    rifabutin  300 mg Oral Daily    sodium chloride flush  5-40 mL IntraVENous 2 times per day    cephALEXin  500 mg Oral Daily    [Held by provider] apixaban  2.5 mg Oral BID    polyethylene glycol  17 g Oral Daily    timolol  1 drop Left Eye BID    dorzolamide  1 drop Left Eye TID    traZODone  50 mg Oral Nightly    [Held by provider] aspirin  81 mg Oral Daily    atorvastatin  80 mg Oral Nightly     PRN Meds: sodium chloride, medicated lip ointment, sodium chloride, albumin human, heparin (porcine), sodium chloride flush, sodium chloride, ondansetron **OR** ondansetron, polyethylene glycol, acetaminophen **OR** acetaminophen      Intake/Output Summary (Last 24 hours) at 10/17/2022 0926  Last data filed at 10/17/2022 0045  Gross per 24 hour   Intake 1605.75 ml   Output 150 ml   Net 1455.75 ml         Exam:    BP (!) 104/59   Pulse 87   Temp 97.3 °F (36.3 °C) (Oral)   Resp 16   Ht 5' 9\" (1.753 m)   Wt 204 lb 2.3 oz (92.6 kg)   SpO2 93%   BMI 30.15 kg/m²     General appearance: No apparent distress, appears stated age and cooperative. HEENT: Pupils equal, round, and reactive to light. Neck: Supple, with full range of motion. No jugular venous distention. Respiratory:  Normal respiratory effort. Clear to auscultation, bilaterally without Rales/Wheezes/Rhonchi. Cardiovascular: Regular rate and rhythm with normal S1/S2 without murmurs, rubs or gallops. Abdomen: Soft, non-tender, non-distended with normal bowel sounds. Musculoskelatal: No clubbing, cyanosis or edema bilaterally. Skin: Skin color, texture, turgor normal.  No rashes or lesions. Neurologic: grossly non-focal.  Psychiatric: Alert and oriented, thought content appropriate, normal insight    Labs:   Recent Labs     10/15/22  1401 10/15/22  1710 10/15/22  1933 10/16/22  0742 10/16/22  1257 10/16/22  2150 10/17/22  0635 10/17/22  0710   WBC 9.2 10.4  --   --   --   --  8.2  --    HGB 6.5* 5.6*   < > 5.6*   < > 6.9* 7.3* 7.1*   HCT 20.1* 17.0*   < > 17.5*   < > 21.1* 21.5* 21.6*    157  --  147  --   --  92*  --     < > = values in this interval not displayed. Recent Labs     10/15/22  1401 10/16/22  0742 10/17/22  0635    140 136   K 4.0 3.8 4.1    102 100   CO2 27 28 25   BUN 60* 31* 42*   CREATININE 1.7* 1.3 1.8*   CALCIUM 8.5 8.6 8.2*   PHOS 1.4* 1.1* 1.7*       Recent Labs     10/16/22  0742   AST 24   ALT <5*   BILIDIR 0.4*   BILITOT 0.8   ALKPHOS 77     Recent Labs     10/17/22  0710   INR 2.50*     No results for input(s): CKTOTAL, TROPONINI in the last 72 hours. Studies:  CT ABDOMEN PELVIS WO CONTRAST Additional Contrast? None   Final Result   1. No evidence of intra-abdominal bleeding. 2.  Bilateral lower lobe opacities, atelectasis versus pneumonia. 3.  Small bilateral pleural effusions. XR CHEST PORTABLE   Final Result      Mild perihilar opacity with pulmonary vascular indistinctness may represent mild pulmonary edema. Bandlike opacity in the right midlung may represent atelectasis. Cardiomegaly.               Assessment/Plan:  83 yo male with complex medical history who presented for hemodialysis due to lack of dialysis transport, has subsequently developed acute anemia. ESRD needing dialysis  Fluid overload  Nephrology consulted  Social work consult      Acute anemia prob sec to GI bleed; on chronic anemia of CKD: POA  Acute anemia/GIB possibly sec to ulcer and anticioagulation  Received PRBC  Stat CT abd/pelvis: no hemorrhage  Patient does have history of PUD earlier this summer. Family reports resolved. Check FOBT. GI consulted. Holding blood thinners    EGD: 1) small pyloric channel ulcer (5 mm) with intact clip in place from recent EGD. 2) no active upper GI bleeding source /traces of fresh blood identified on EGD. 3) Moderate diffuse gastritis. IV--> PO PPI  Clears, advance if no further evidence of bleed  Monitor Hgb; iron studies reviewed. 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      CAD, hx of STEMI  statin, Eliquis 2.5 mg BID  Per records appears is off ASA 81 mg - stopped (received 2 doses)       Hx of MSSA bacteremia, endocarditis  Refused explant  Ancef 2 g 10/12  Transitioned to Keflex per med/rec, chart review of ID recs for chronic suppression  Rifabutin home dose  Will need follow-up with UC ID       Stage 3 wound on buttocks  Wound care consulted      High risk for delirium w/hx of insomnia  Delirium precautions ordered however will need frequent H&H checks  Discussed delirium precautions with family at bedside         DVT Prophylaxis: on Eliquis: held with concern for bleed.  SCDs  PT/OT Eval Status: Ongoing  Diet: Diet NPO Exceptions are: Sips of Clear Liquids, Sips of Water with Meds  Code Status: Full Code      Disposition - Inpatient pending progress  Pxt is from [facility]: appears does not want to go back there  [facility] out for alternative facility  To DC to SNF likely in 1-2 days    Van Gil MD

## 2022-10-17 NOTE — PROGRESS NOTES
4 Eyes Skin Assessment     The patient is being assess for  Transfer to New Unit    I agree that 2 RN's have performed a thorough Head to Toe Skin Assessment on the patient. ALL assessment sites listed below have been assessed. Areas assessed by both nurses:  [x]   Head, Face, and Ears   [x]   Shoulders, Back, and Chest- Scattered abrasions from old tele stickers on chest and abdomen  [x]   Arms, Elbows, and Hands   [x]   Coccyx, Sacrum, and Ischium- Unstageable coccyx until mid to left buttocks , Stage II L groin  [x]   Legs, Feet, and Heels, Stage II R heel, Discoloration L heel        Does the Patient have Skin Breakdown?   Yes a wound was noted on the Admission Assessment and an WOUND LDA was Initiated documentation include the Jayde-wound, Wound Assessment, Measurements, Dressing Treatment, Drainage, and Color\",         Suraj Prevention initiated:  Yes   Wound Care Orders initiated:  Yes      [address]  nurse consulted for Pressure Injury (Stage 3,4, Unstageable, DTI, NWPT, and Complex wounds):  Yes      Nurse 1 eSignature: Electronically signed by Tracy Garcia RN on 01/65/45 at 12:47 AM EDT    **SHARE this note so that the co-signing nurse is able to place an eSignature**    Nurse 2 eSignature: Electronically signed by Alannah Ambrose RN on 10/17/22 at 2:07 AM EDT

## 2022-10-17 NOTE — PROGRESS NOTES
D: H/H 6.9/21.1 which was obtained from [facility]. Very hard stick for labs and flds. Lab was unable to obtain blood after several sticks d/t edema so obtained order from Nephrology to access vast cath. B/p low 91/55 and was in 80's prior to home dose of Midodrine restarted. Discussed with daughter about code status and she stated will talk to pr's wife/mother. Discussed with daughter about asking MD for Palliative care consult. Daughter stated coded 2x's on recent admission at [facility]. Last time coded was during colonoscopy at [facility]. GI suppose to do EGD & colonoscopy in AM, but family refusing colonoscopy. Black stool and postive guaiac. Transferring to PCU for further care. H/H ordered q6hrs. Noted meplix dsg to L-groin looks like old line site, but noted greenish drainage. Cleansed with CHG & applied band aid  GI note: Transfuse for H&H closer to 7 g/dL. Will notify MD about H/H.   A; Cont to monitor during hourly rounds    5348 A: Dr. Carlos Jay MD was notified about H/H.  Cont to monitor during hourly rounds

## 2022-10-17 NOTE — PROGRESS NOTES
Hypertension is stable.  Continue current treatment regimen.  Dietary sodium restriction.  Regular aerobic exercise.  Continue current medications.  Blood pressure will be reassessed in 3 months.  Will check cr today  No alarm s/sx   Nephrology Consult Note        [facility]    [email or web address]       Phone: [phone]                                                  10/17/2022 7:52 AM     Patient: Raza Ann [id number]  [id number]  Date of Admit: 10/11/2022 LOS: 6 days      Interval History and Plan:  Patient seen at bedside with his wife  No SOB on room air  No chest pain  K stable  Phos low  Hb 7.1 S/P blood transfusion and GI planning for EGD  BP remain soft in 90 s  HR in 80s  NO signs of kidney function recovery          Dialysis tomorrow per TTS schedule. Monitor BP closely, Continue Midodrine  Epogen + IV iron during dialysis  GI bleed workup. Monitor Hb and transfuse as needed  Replete phosphorus  Avoid nephrotoxins  Monitor input, output and weight  Monitor labs and vitals closely  Renally dose all medications  Patient/family interested in home dialysis. Will arrange for education about home dialysis as outpatient. D/W patient      Thank you for allowing us to participate in this patient's care    In case of any question please call us at our 24 hour answering service [phone] or from 7 AM to 5 PM via [employer] or cell number    Kwadwo Hernandez MD  [facility]ph[facility]  [address], [address]  Fax: [phone]  Office: [phone]       Assessment & Plan     Renal function:  Acute Kidney Injury and started on Hemodialysis and discharged on 10/5 from [facility] schedule  Access: [facility]. No concern for infection. 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 Lab Results   Component Value Date    CALCIUM 8.2 (L) 10/17/2022    PHOS 1.7 (L) 10/17/2022         Acid/Base status:  Stable. Volume status/BP:  Hypotension. 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 No pertinent surgical history. Family History     History reviewed. No pertinent family history. Social History     Social History     Tobacco Use    Smoking status: Never    Smokeless tobacco: Never   Substance Use Topics    Alcohol use: Not Currently          Past medical, family, and social histories were reviewed as previously documented. Updates were made as necessary. Inpatient Medications and Allergies       Scheduled Meds:    Allergies: No Known Allergies      Vital Signs     Vitals:    10/17/22 0646   BP: (!) 92/46   Pulse:    Resp:    Temp:    SpO2:          Intake/Output Summary (Last 24 hours) at 10/17/2022 0752  Last data filed at 10/17/2022 0045  Gross per 24 hour   Intake 1605.75 ml   Output 150 ml   Net 1455.75 ml           Physical Exam     General appearance: NAD  Head: Normocephalic, without obvious abnormality, atraumatic   Mouth: Moist mucous membranes. Neck: Supple. Lungs: Good air entry bilaterally. No respiratory distress on room air. Heart: S1, S2.  Abdomen: soft, non-tender non-distended  Extremities: Mild leg edema  Skin: No concerning rashes noted  Psych: good eye contact, normal affect  Neuro: AAO x 3  TDC in place.        Laboratory Data     Please see above     Diagnostic Studies   Pertinent images reviewed

## 2022-10-17 NOTE — PROGRESS NOTES
Occupational / Physical Therapy  Hold / Off floor   Attempt to see for OT/PT this date. Pt currently off floor for EGD procedure. Will follow up at a later date/time.      Dorinda Jackson, TIFFANIE, OTR/L

## 2022-10-17 NOTE — PROGRESS NOTES
Received pt from [address] [place] [address] via special bed mattress. Initial vital signs taken, assessment done. Alert and oriented x 3 except to place. Reoriented to place. 4 eyes skin assessment done with Lucy Young RN. Pericare and incontinence care done. Black loose stool noted. Changed dressing on pressure sores done PRN. Repositioned pt and turned to right side. Introduced fall contract, oriented to room and safety precautions implanted to both son and pt. Will continue to monitor pt.

## 2022-10-17 NOTE — PLAN OF CARE
Problem: Skin/Tissue Integrity  Goal: Absence of new skin breakdown  Description: 1. Monitor for areas of redness and/or skin breakdown  2. Assess vascular access sites hourly  3. Every 4-6 hours minimum:  Change oxygen saturation probe site  Outcome: Progressing  Note: 4 eyes skin assessment done with Jacky García RN. Dressing changes done. Pericare done. Turning q2 and repositioned pt PRN. Problem: Safety - Adult  Goal: Free from fall injury  Outcome: Progressing  Note: Pt is a Fall Risk. See Ronnie Adriel Fall Risk Score. Pt bed in low position and side rails up. Call light and belongings in reach. Pt encouraged to call for assistance. Will continue with hourly rounds for PO intake, pain needs, toileting, and repositioning as needed. Bed alarm on.       Problem: ABCDS Injury Assessment  Goal: Absence of physical injury  Outcome: Progressing  Flowsheets (Taken 10/17/2022 0257)  Absence of Physical Injury: Implement safety measures based on patient assessment     Problem: Cardiovascular - Adult  Goal: Maintains optimal cardiac output and hemodynamic stability  Outcome: Progressing  Flowsheets (Taken 10/17/2022 0258)  Maintains optimal cardiac output and hemodynamic stability:   Monitor blood pressure and heart rate   Monitor urine output and notify Licensed Independent Practitioner for values outside of normal range   Assess for signs of decreased cardiac output   Administer fluid and/or volume expanders as ordered     Problem: Cardiovascular - Adult  Goal: Absence of cardiac dysrhythmias or at baseline  Outcome: Progressing  Flowsheets (Taken 10/17/2022 0258)  Absence of cardiac dysrhythmias or at baseline:   Monitor cardiac rate and rhythm   Assess for signs of decreased cardiac output     Problem: Hematologic - Adult  Goal: Maintains hematologic stability  Outcome: Progressing  Flowsheets (Taken 10/17/2022 0258)  Maintains hematologic stability:   Assess for signs and symptoms of bleeding or hemorrhage   Monitor labs for bleeding or clotting disorders

## 2022-10-18 NOTE — PROGRESS NOTES
Wife is interested in Palliative care. Patient having a lot of pain, particularly with turning and changing. M.D. notified.

## 2022-10-18 NOTE — PROGRESS NOTES
Palliative Care Chart Review  and Check in Note:     NAME:  Talib Perez  Admit Date: 10/11/2022  Hospital Day:  Hospital Day: 8   Current Code status: Full Code    Palliative care is continuing to following Mr. Eleni Morris for symptom management,  and goals of care discussion as needed. Patient's chart reviewed today 10/18/22. Met with pt, wife and son at the bedside. Discussed their understanding of pt's condition. They report pt has had significant decline since his last admission. They prefer that the pt be at home, however they have been unable to secure stretcher transport to and from dialysis. Confirmed how difficult it is to find stretcher transport for dialysis. Plan at this time is for him to go to [facility] temporarily until plans for transportation can be made at home. They do have a hospital bed and micky lift at home. We discussed care of pt's pressure ulcer and diet. We also discussed pain control, will schedule tylenol. They would like for PT/OT to work with the pt, potentially get him up to the chair with micky lift. They also want to talk with the  about discharge plan. D/w DORIS Cornejo, PT/OT, CM DORIS Kay. The following are the currently established goals/code status, and Symptom management. Goals of care: Continue with aggressive management, pt/family goal is for him to return home.      Code status: Full Code    Discharge plan: [facility]  10/18/22  9:36 AM

## 2022-10-18 NOTE — PROGRESS NOTES
Hospitalist Progress Note      PCP: Jeana Brody MD    Date of Admission: 10/11/2022      Chief Complaint:  ESRD needing dialysis, volume overload       82 y.o. 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 He has a chair time but it appears his transportation for dialysis was not set-up. Aside from this he has not developed any new symptoms since recent discharge from . At that admission he also need dialysis due to social work issues. He refused explantation of his PPM and has been on Ancef with planned switch to Keflex tomorrow for chronic suppression        Subjective:   Pxt is confused, (?baseline), but is awake, denies specific complaints relayed, no signs of further bleeding noted.      No abd pain    Appears tolerating clears    Bps improved but remain soft    For HD today    Son at bedside      Improved with fluid bolus      Went for EGD today: Findings reviewed        Medications:  Reviewed    Infusion Medications    sodium chloride       Scheduled Medications    iron sucrose  100 mg IntraVENous Daily    acetaminophen  650 mg Oral 3 times per day    Or    acetaminophen  650 mg Rectal 3 times per day    midodrine  10 mg Oral TID WC    pantoprazole  40 mg Oral BID AC    melatonin  5 mg Oral Nightly    epoetin destiny-epbx  10,000 Units IntraVENous Once per day on Tue Thu Sat    ferrous sulfate  325 mg Oral Daily with breakfast    Venelex   Topical BID    collagenase   Topical Daily    rifabutin  300 mg Oral Daily    sodium chloride flush  5-40 mL IntraVENous 2 times per day    cephALEXin  500 mg Oral Daily    [Held by provider] apixaban  2.5 mg Oral BID    polyethylene glycol  17 g Oral Daily    timolol  1 drop Left Eye BID    dorzolamide  1 drop Left Eye TID    traZODone  50 mg Oral Nightly [Held by provider] aspirin  81 mg Oral Daily    atorvastatin  80 mg Oral Nightly     PRN Meds: heparin flush, medicated lip ointment, albumin human, heparin (porcine), sodium chloride flush, sodium chloride, ondansetron **OR** ondansetron, polyethylene glycol      Intake/Output Summary (Last 24 hours) at 10/18/2022 1129  Last data filed at 10/18/2022 0515  Gross per 24 hour   Intake 420 ml   Output 40 ml   Net 380 ml         Exam:    BP (!) 108/56   Pulse 82   Temp 97.9 °F (36.6 °C) (Oral)   Resp 18   Ht 5' 9\" (1.753 m)   Wt 203 lb 14.8 oz (92.5 kg)   SpO2 100%   BMI 30.11 kg/m²     General appearance: No apparent distress, appears stated age and cooperative. Neck: Supple, with full range of motion. No jugular venous distention. Respiratory:  Normal respiratory effort. Clear to auscultation, bilaterally without Rales/Wheezes/Rhonchi. Cardiovascular: Regular rate and rhythm with normal S1/S2 without murmurs, rubs or gallops. Abdomen: Soft, non-tender, non-distended with normal bowel sounds. Musculoskelatal: No clubbing, cyanosis or edema bilaterally. Skin: Skin color, texture, turgor normal.  No rashes or lesions. Neurologic: grossly non-focal.  Psychiatric: Alert, confused (?baseline)      Labs:   Recent Labs     10/15/22  1710 10/15/22  1933 10/16/22  0742 10/16/22  1257 10/17/22  0635 10/17/22  0710 10/17/22  1712 10/18/22  0019 10/18/22  0557   WBC 10.4  --   --   --  8.2  --   --   --  9.3   HGB 5.6*   < > 5.6*   < > 7.3*   < > 7.3* 7.4* 7.8*   HCT 17.0*   < > 17.5*   < > 21.5*   < > 22.7* 23.0* 23.2*     --  147  --  92*  --   --   --  195    < > = values in this interval not displayed.        Recent Labs     10/16/22  0742 10/17/22  0635 10/18/22  0557    136 139   K 3.8 4.1 3.7    100 102   CO2 28 25 23   BUN 31* 42* 47*   CREATININE 1.3 1.8* 2.2*   CALCIUM 8.6 8.2* 8.4   PHOS 1.1* 1.7* 2.8       Recent Labs     10/16/22  0742   AST 24   ALT <5*   BILIDIR 0.4*   BILITOT 0.8 ALKPHOS 77       Recent Labs     10/17/22  0710   INR 2.50*       No results for input(s): Michael Ruth in the last 72 hours. Studies:  CT ABDOMEN PELVIS WO CONTRAST Additional Contrast? None   Final Result   1. No evidence of intra-abdominal bleeding. 2.  Bilateral lower lobe opacities, atelectasis versus pneumonia. 3.  Small bilateral pleural effusions. XR CHEST PORTABLE   Final Result      Mild perihilar opacity with pulmonary vascular indistinctness may represent mild pulmonary edema. Bandlike opacity in the right midlung may represent atelectasis. Cardiomegaly. Assessment/Plan:  83 yo male with complex medical history who presented for hemodialysis due to lack of dialysis transport, has subsequently developed acute anemia. ESRD needing dialysis  Fluid overload  HD T/T/S  Continue Midodrine  Nephrology consulted  Social work consult      Acute anemia prob sec to GI bleed; on chronic anemia of CKD: POA  Acute anemia/GIB possibly sec to ulcer and anticioagulation  Received PRBC  CT abd/pelvis: no hemorrhage  Patient does have history of PUD earlier this summer. Family reports resolved. GI consulted. EGD: 1) small pyloric channel ulcer (5 mm) with intact clip in place from recent EGD. 2) no active upper GI bleeding source /traces of fresh blood identified on EGD. 3) Moderate diffuse gastritis. IV--> PO PPI  Clears, advance if no further evidence of bleed and ok with GI  Monitor Hgb: Decrease freq of checks; iron studies reviewed. Folat mildly decreased. B12 WNL: Replenish Folate  Resume AC if/when OK with GI      P. Afib: POA  Tachy federico syndrome s/p PPM s/p PPM  - Paced rhythm on telemetry  - Eliquis 2.5 mg BID: Resume if/when ok with GI      CAD, hx of STEMI  On statin,  ASA  Per records appears is off ASA 81 mg - was stopped (received 2 doses):  Attempt to clarify why off; Defer to Cardiologists       Hx of MSSA bacteremia, endocarditis  Refused device explant  Ancef 2 g 10/12  Transitioned to Keflex per med/rec, chart review of ID recs for chronic suppression  Also on Rifabutin home dose  Will need follow-up with UC ID       Stage 3 wound on buttocks, heels: POA  Wound care consulted      Left leg edema: POA  - Undetermined etiology  - could eval with dopplers, but he is on [address] already  - Will also review records from recent hospital stays to see if was diagnosed with DVT  - Resume [address] if ok with GI      Prob Dementia: POA  High risk for delirium w/hx of insomnia  Delirium precautions ordered however will need frequent H&H checks  Discussed delirium precautions with family at bedside         DVT Prophylaxis: on Eliquis: held with concern for bleed. SCDs  PT/OT Eval Status: Ongoing  Diet: ADULT DIET; Full Liquid; no red foods, per family  Code Status: Full Code      Disposition - Inpatient pending progress  Pxt is from Home. Recent stays in 2 OSH  For Dialysis today; diet advance; resumption of AC  If stable/no further issues, likely DC to SNF tomorrow if set up  Family ultimately want to take him home from SNF, if O/p HD (which appears has been an issue after recent Hospitalizations at OSH) is well and reliably set up. Horton Sever, MD

## 2022-10-18 NOTE — PROGRESS NOTES
Notified MD Mayela Burnette of the patient's 85/55 blood pressure upon returning back to the floor from HD. Awaiting new orders.

## 2022-10-18 NOTE — PROGRESS NOTES
Physical Therapy  Daily Treatment Note    Discharge Recommendations: John Garay scored a 6/24 on the AM-PAC short mobility form. Current research shows that an AM-PAC score of 17 or less is typically not associated with a discharge to the patient's home setting. Based on the patient's AM-PAC score and their current functional mobility deficits, it is recommended that the patient have 3-5 sessions per week of Physical Therapy at d/c to increase the patient's independence. Please see assessment section for further patient specific details. Assessment:  Pt with good participation for exercises. Session limited due to pt leaving for dialysis treatment. Pt would benefit from continued IP PT to maximize strength and function. Equipment Needs: None anticipated    Chart Reviewed: Yes     Other position/activity restrictions: Up with assistance   Additional Pertinent Hx: Pt is an 83 yo male that presents from home for diaylsis. Pt recent was released from [address]: Mild perihilar opacity with pulmonary vascular indistinctness may represent mild pulmonary edema. Diagnosis: ESRD needing Dialysis   Treatment Diagnosis: Impaired functional mobility    Subjective: Pt in bed. Daughter present. RN reports pt scheduled for dialysis this afternoon, but OK for ROM in bed. Pain: Pt denies at this time    Objective:    Exercises  10 reps AROM (through limited range) for toe flex/ext, ankle dorsi/plantarflexion  5 reps B gentle heel cord stretch (to ~ neutral dorsiflexion). Other: No other exercises due to pt leaving for dialysis. Patient Education  Role of PT. Expressed understanding. Safety Devices  Pt left in bed with RN present to transport pt to dialysis.      AM-PAC score  AM-PAC Inpatient Mobility Raw Score : 6  AM-PAC Inpatient T-Scale Score : 23.55  Mobility Inpatient CMS 0-100% Score: 100  Mobility Inpatient CMS G-Code Modifier : CN    Goals: (as determined and assessed by primary PT)  Time Frame for Short Term Goals: discharge  Short Term Goal 1: Sit<>supine Min A   Short Term Goal 2: Sit<>Stand with RW Mod A   Short Term Goal 3: Rolling right to left Min A          Plan:  Plan:  (2-5)  Current Treatment Recommendations: Strengthening, Balance training, Gait training, Stair training, Functional mobility training, Transfer training, Endurance training, Patient/Caregiver education & training    Therapy Time    Individual  Concurrent  Group  Co-treatment    Time In  1428            Time Out  1440            Minutes  12              Timed Code Treatment Minutes: 12  Total Treatment Minutes: 12    Will continue per plan of care. If patient is discharged prior to next treatment, this note will serve as the discharge summary.     Florina [place] #[id number]

## 2022-10-18 NOTE — PROGRESS NOTES
Acid/Base status:  Stable. Volume status/BP:  BP soft. Patient has mild edema but no SOB at rest on room air. No acute volume overload concerns. Hematology:   CKD related anemia  Goal Hgb 10-11    Lab Results   Component Value Date    IRON 25 (L) 10/15/2022    TIBC 74 (L) 10/15/2022    FERRITIN 564.1 (H) 10/15/2022     Lab Results   Component Value Date    WBC 9.3 10/18/2022    HGB 7.8 (L) 10/18/2022    HCT 23.2 (L) 10/18/2022    MCV 85.6 10/18/2022     10/18/2022             Reason for Consult and Chief Complaint     Reason for consult: HENRY on HD    Chief complaint:   Chief Complaint   Patient presents with    Procedure     Via EMS from home, with c/o needing dialysis, states he goes on t, th, sat. States his first dialysis treatment was this past Saturday at [facility]. States he was not able to go there today to receive treatment. Pt denies cp, sob, fevers, chills. History of Present Illness   Clementina Roach is a 82 y.o. with PMH significant for HFrEF (40%), STEMI on 6/2022, s/p pacemaker 7/2022 2/2 Tachybrady syndrome, Afib on Eliquis, Aortic Stenosis, HLD, HTN, HENRY on Hemodialysis was discharged from [facility] on 10/5 but due to transportation issues patient was not able to go to outpatient dialysis clinic and he was brought to ER by family for dialysis. Patient is accompanied by her wife. Patient is comfortable on room air and denied SOB, chest pain, fever or cough. Review of Systems   Positive in bold or unable to assess     GEN: Fever, chills, night sweats. HEENT: Changes in vision, sore throat, rhinorrhea   CVS: Chest pain, palpitations, swelling or edema in legs  Pulmonary: Cough, hemoptysis, SOB  GI: Nausea, vomiting, diarrhea, constipation, abdominal pain  : Bladder incontinence, dysuria,hematuria. MSK: Muscle or joint or bone pains  Skin: Rashes, ulcers, skin thickness  CNS: Headache, dizziness, confusion, focal weakness, seizure. Psych:  Anxiety, agitation, depression. Reviewed all 12 systems, negative except as above. 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 No pertinent family history. Social History     Social History     Tobacco Use    Smoking status: Never    Smokeless tobacco: Never   Substance Use Topics    Alcohol use: Not Currently          Past medical, family, and social histories were reviewed as previously documented. Updates were made as necessary. Inpatient Medications and Allergies       Scheduled Meds:    Allergies: No Known Allergies      Vital Signs     Vitals:    10/18/22 0812   BP: (!) 108/56   Pulse: 82   Resp: 18   Temp: 97.9 °F (36.6 °C)   SpO2: 100%         Intake/Output Summary (Last 24 hours) at 10/18/2022 0829  Last data filed at 10/18/2022 0515  Gross per 24 hour   Intake 420 ml   Output 40 ml   Net 380 ml           Physical Exam     General appearance: NAD  Head: Normocephalic, without obvious abnormality, atraumatic   Mouth: Moist mucous membranes. Neck: Supple. Lungs: Good air entry bilaterally. No respiratory distress on room air. Heart: S1, S2.  Abdomen: soft, non-tender non-distended  Extremities: Mild leg edema  Skin: No concerning rashes noted  Psych: good eye contact, normal affect  Neuro: AAO x 3  TDC in place.        Laboratory Data     Please see above     Diagnostic Studies   Pertinent images reviewed

## 2022-10-18 NOTE — CONSULTS
Comprehensive Nutrition Assessment    Type and Reason for Visit:  Consult, Reassess    Nutrition Recommendations/Plan:   Continue FLD and advance as tolerated  Add Ensure Enlive BID  Monitor nutrition adequacy, pertinent labs, bowel habits, wt changes, and clinical progress     Malnutrition Assessment:  Malnutrition Status: At risk for malnutrition (Comment) (10/18/22 4991)    Context:  Acute Illness     Findings of the 6 clinical characteristics of malnutrition:  Energy Intake:  50% or less of estimated energy requirements for 5 or more days  Weight Loss:  No significant weight loss     Fluid Accumulation:  Mild      Nutrition Assessment:    Consult for poor appetite/intake 5 or more days: Pt has been NPO x2 days, moved to [facility] today, no meals received yet. Pt had poor intakes <25% prior to moving to NPO. Will add Ensure CLR BID and advance ONS as diet is advanced. K/Phos and BG WNL, currently Ensure Enlive will be most appropriate ONS. Will continue to monitor. Nutrition Related Findings:    Labs reviewed. Active bowel sounds. +BM 10/17. +1 pitting trace edema. Wound Type: Pressure Injury, Stage II (PI to lt heel; US PI left upper buttock; St 2 PI left lower buttocks)       Current Nutrition Intake & Therapies:    Average Meal Intake: 1-25%, 0%  Average Supplements Intake: None Ordered  ADULT DIET; Clear Liquid    Anthropometric Measures:  Height: 5' 9\" (175.3 cm)  Ideal Body Weight (IBW): 160 lbs (73 kg)       Current Body Weight: 207 lb (93.9 kg), 129.4 % IBW. Weight Source: Bed Scale  Current BMI (kg/m2): 30.6                          BMI Categories: Obese Class 1 (BMI 30.0-34. 9)    Estimated Daily Nutrient Needs:  Energy Requirements Based On: Kcal/kg (25-30 kcal/IBW/73 kg)  Weight Used for Energy Requirements: Ideal  Energy (kcal/day): 3052-5544  Weight Used for Protein Requirements: Ideal  Protein (g/day): 88-95  Method Used for Fluid Requirements: Standard Renal  Fluid (ml/day):      Nutrition Diagnosis: Inadequate oral intake related to inadequate protein-energy intake as evidenced by intake 0-25%, NPO or clear liquid status due to medical condition    Nutrition Interventions:   Food and/or Nutrient Delivery: Continue Current Diet, Start Oral Nutrition Supplement  Nutrition Education/Counseling: Education not indicated  Coordination of Nutrition Care: Continue to monitor while inpatient       Goals:  Previous Goal Met: Progressing toward Goal(s)  Goals: PO intake 50% or greater, prior to discharge       Nutrition Monitoring and Evaluation:   Behavioral-Environmental Outcomes: None Identified  Food/Nutrient Intake Outcomes: Food and Nutrient Intake, Supplement Intake  Physical Signs/Symptoms Outcomes: Nutrition Focused Physical Findings, Weight, Biochemical Data, Meal Time Behavior    Discharge Planning:     Too soon to determine     Makayla Quinn [address] [place] [address],   Contact: [phone]

## 2022-10-18 NOTE — PROGRESS NOTES
Treatment time: 3 hours  Net UF: 295 ml     Pre weight: 92.5 kg  Post weight:92.5 kg    Access used: RTDC    Access function: well with  ml/min     Medications or blood products given: heparin dwells     Regular outpatient schedule: TTS     Summary of response to treatment: Patient tolerated treatment well and without any complications.  Patient remained stable throughout entire treatment     10/18/22 1506 10/18/22 1720   Treatment   Time On 1506  --    Time Off  --  1720   Vital Signs   BP (!) 101/48 (!) 93/41   Temp 98.7 °F (37.1 °C) 97.4 °F (36.3 °C)   Heart Rate 85 85   Resp 18 18   Weight  --  203 lb 14.8 oz (92.5 kg)

## 2022-10-18 NOTE — CARE COORDINATION
CM following for discharge planning. Plan is for pt to go to [facility] with HD, once approved for HD (run sheets sent over). Precert ended today. VM left for [place] with [facility] to restart precert. New PT note charted.      Haritha Hinkle RN, BSN, Nancy Kurtz  Case Management Department  [phone]

## 2022-10-18 NOTE — PLAN OF CARE
Problem: Discharge Planning  Goal: Discharge to home or other facility with appropriate resources  Outcome: Progressing  Flowsheets (Taken 10/17/2022 2005)  Discharge to home or other facility with appropriate resources: Identify barriers to discharge with patient and caregiver   Patient may benefit from home health care or SNF at ID    Problem: Pain  Goal: Verbalizes/displays adequate comfort level or baseline comfort level  10/18/2022 0015 by Ruth Jaffe RN  Outcome: Progressing   Patient C/o pain \"all over'. Tearful this evening. See MAR for interventions. Repositioned for comfort. Problem: Safety - Adult  Goal: Free from fall injury  Outcome: Progressing   Patient meets Reyes Sauger fall risk guidelines. Non skid footwear with ambulation. Bed in lowest position. Call light in reach. Bed alarm activated. Reminded to call for assistance before exiting bed. Continue safety precautions. Problem: Hematologic - Adult  Goal: Maintains hematologic stability  Outcome: Progressing  Flowsheets (Taken 10/17/2022 2005)  Maintains hematologic stability: Assess for signs and symptoms of bleeding or hemorrhage   Continue Q6 hour H&H, Monitor for S&S of bleeding. VSS.

## 2022-10-18 NOTE — PROGRESS NOTES
[address] [place] [address]  GI Progress Note          Tailb Perez is a 82 y.o. male patient. 1. ESRD (end stage renal disease) on dialysis ([address]    2. Melena        Admit Date: 10/11/2022    Subjective:     Patient seen Mary Jeong in bed this morning with wife and son at bedside. He is supposed to get his scheduled dialysis this morning.      ROS:  Cardiovascular ROS: no chest pain or dyspnea on exertion  Gastrointestinal ROS: no abdominal pain, change in bowel habits, or black or bloody stools  Respiratory ROS: no cough, shortness of breath, or wheezing    Scheduled Meds:   iron sucrose  100 mg IntraVENous Daily    acetaminophen  650 mg Oral 3 times per day    Or    acetaminophen  650 mg Rectal 3 times per day    midodrine  10 mg Oral TID WC    pantoprazole  40 mg Oral BID AC    melatonin  5 mg Oral Nightly    epoetin destiny-epbx  10,000 Units IntraVENous Once per day on Tue Thu Sat    ferrous sulfate  325 mg Oral Daily with breakfast    Venelex   Topical BID    collagenase   Topical Daily    rifabutin  300 mg Oral Daily    sodium chloride flush  5-40 mL IntraVENous 2 times per day    cephALEXin  500 mg Oral Daily    [Held by provider] apixaban  2.5 mg Oral BID    polyethylene glycol  17 g Oral Daily    timolol  1 drop Left Eye BID    dorzolamide  1 drop Left Eye TID    traZODone  50 mg Oral Nightly    [Held by provider] aspirin  81 mg Oral Daily    atorvastatin  80 mg Oral Nightly       Continuous Infusions:   sodium chloride         PRN Meds:  heparin flush, medicated lip ointment, albumin human, heparin (porcine), sodium chloride flush, sodium chloride, ondansetron **OR** ondansetron, polyethylene glycol      Objective:       Patient Vitals for the past 24 hrs:   BP Temp Temp src Pulse Resp SpO2 Weight   10/18/22 0812 (!) 108/56 97.9 °F (36.6 °C) Oral 82 18 100 % --   10/18/22 0407 -- -- -- -- -- -- 203 lb 14.8 oz (92.5 kg)   10/18/22 0254 (!) 90/42 97.5 °F (36.4 °C) Axillary 81 18 99 % --   10/17/22 2319 (!) 95/55 97.5 °F (36.4 °C) Axillary 86 18 97 % --   10/17/22 2005 (!) 98/46 97.8 °F (36.6 °C) Axillary 80 18 94 % --   10/17/22 1642 (!) 105/57 97.3 °F (36.3 °C) Oral 81 16 97 % --   10/17/22 1324 (!) 104/59 97.3 °F (36.3 °C) Oral 87 16 93 % --   10/17/22 1239 129/69 -- -- 82 16 100 % --   10/17/22 1235 114/71 -- -- 80 18 100 % --   10/17/22 1225 130/82 -- -- 92 18 100 % --   10/17/22 1215 (!) 99/54 -- -- 82 18 100 % --   10/17/22 1214 (!) 99/58 97.3 °F (36.3 °C) Infrared 85 18 100 % --       Exam:  VITALS:  BP (!) 108/56   Pulse 82   Temp 97.9 °F (36.6 °C) (Oral)   Resp 18   Ht 5' 9\" (1.753 m)   Wt 203 lb 14.8 oz (92.5 kg)   SpO2 100%   BMI 30.11 kg/m²   TEMPERATURE:  Current - Temp: 97.9 °F (36.6 °C); Max - Temp  Av.5 °F (36.4 °C)  Min: 97.3 °F (36.3 °C)  Max: 97.9 °F (36.6 °C)    NAD  General appearance: alert, appears stated age, cooperative and no distress  Head: Normocephalic, without obvious abnormality, atraumatic  Neck: supple, symmetrical, trachea midline and thyroid not enlarged, symmetric, no tenderness/mass/nodules  CVS:  RRR, Nl s1s2  Lungs CTA Bilaterally, normal effort  Abdomen: soft, non-tender; bowel sounds normal; no masses,  no organomegaly  AAOx2, No asterixis or encephalopathy  Extremities: No edema. Recent Labs     10/15/22  1710 10/15/22  1933 10/16/22  0742 10/16/22  1257 10/17/22  0635 10/17/22  0710 10/17/22  1712 10/18/22  0019 10/18/22  0557   WBC 10.4  --   --   --  8.2  --   --   --  9.3   HGB 5.6*   < > 5.6*   < > 7.3*   < > 7.3* 7.4* 7.8*   HCT 17.0*   < > 17.5*   < > 21.5*   < > 22.7* 23.0* 23.2*   MCV 84.4  --   --   --  84.2  --   --   --  85.6     --  147  --  92*  --   --   --  195    < > = values in this interval not displayed.      Recent Labs     10/16/22  0742 10/17/22  0635 10/18/22  0557    136 139   K 3.8 4.1 3.7    100 102   CO2 28 25 23   PHOS 1.1* 1.7* 2.8   BUN 31* 42* 47*   CREATININE 1.3 1.8* 2.2*     Recent Labs     10/16/22  7532 AST 24   ALT <5*   BILIDIR 0.4*   BILITOT 0.8   ALKPHOS 77     Recent Labs     10/16/22  0742 10/17/22  0710   PROT 5.7*  --    INR  --  2.50*       EGD done 10/18/22:   ESOPHAGUS: Diaphragmatic hiatus was 40 cm from incisors and GE junction (upper margin of gastric folds) was at 40 cm from incisors. Squamocolumnar junction was at 40 cm from incisors. Mucosa appeared normal.  STOMACH:  pyloric channel ulcer with intact clip from recent EGD. No active bleeding identified after thorough irrigation at the clip site. , antrum revealed gastritis,  Body, fundus and cardia, including retroflexed views revealed mild to moderate diffuse gastritis. Gastric antral and body of stomach Bx done to  r/o H. Pylori gastritis.   DUODENUM: Duodenal bulb  normal. Descending portion of the duodenum appeared  normal.    Assessment:       Gastritis  Melena  ESRD on HD    Recommendations:       -Continue Protonix 40mg BID for 3 months  -avoid NSAIDs  -f/u biopsy results from EGD    Cecilia Garnica MD, PGY-1  10/18/2022  11:54 AM     Patient will be staffed and discussed with attending physician Dr. Lilliana Donovan MD.

## 2022-10-19 NOTE — PROGRESS NOTES
Hospitalist Progress Note      PCP: Morse Lesches, MD    Date of Admission: 10/11/2022      Chief Complaint:  ESRD needing dialysis, volume overload     82 y.o. 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 He has a chair time but it appears his transportation for dialysis was not set-up. Aside from this he has not developed any new symptoms since recent discharge from . At that admission he also need dialysis due to social work issues. He refused explantation of his PPM and has been on Ancef with planned switch to Keflex tomorrow for chronic suppression        Subjective:   Pxt is confused, (?baseline), but is awake, denies specific complaints relayed, no signs of further bleeding noted. No abd pain    Appears tolerating clears    BPs improved but remain soft; requiring infusion of albumin ny nephrology    Wife at bedside    [facility] for EGD 10/18.      No bleedingnoted        Medications:  Reviewed    Infusion Medications    sodium chloride       Scheduled Medications    iron sucrose  100 mg IntraVENous Daily    acetaminophen  650 mg Oral 3 times per day    Or    acetaminophen  650 mg Rectal 3 times per day    midodrine  10 mg Oral TID WC    pantoprazole  40 mg Oral BID AC    melatonin  5 mg Oral Nightly    epoetin destiny-epbx  10,000 Units IntraVENous Once per day on Tue Thu Sat    ferrous sulfate  325 mg Oral Daily with breakfast    Venelex   Topical BID    collagenase   Topical Daily    rifabutin  300 mg Oral Daily    sodium chloride flush  5-40 mL IntraVENous 2 times per day    cephALEXin  500 mg Oral Daily    [Held by provider] apixaban  2.5 mg Oral BID    polyethylene glycol  17 g Oral Daily    timolol  1 drop Left Eye BID    dorzolamide  1 drop Left Eye TID    traZODone  50 mg Oral Nightly [Held by provider] aspirin  81 mg Oral Daily    atorvastatin  80 mg Oral Nightly     PRN Meds: heparin flush, medicated lip ointment, albumin human, heparin (porcine), sodium chloride flush, sodium chloride, ondansetron **OR** ondansetron, polyethylene glycol      Intake/Output Summary (Last 24 hours) at 10/19/2022 0944  Last data filed at 10/19/2022 0935  Gross per 24 hour   Intake 230 ml   Output 1100 ml   Net -870 ml         Exam:    BP (!) 93/49   Pulse 86   Temp 97.4 °F (36.3 °C) (Oral)   Resp 20   Ht 5' 9\" (1.753 m)   Wt 202 lb 9.6 oz (91.9 kg)   SpO2 94%   BMI 29.92 kg/m²     General appearance: No apparent distress, appears stated age and cooperative. Neck: Supple, with full range of motion. No jugular venous distention. Respiratory:  Normal respiratory effort. Clear to auscultation, bilaterally without Rales/Wheezes/Rhonchi. Cardiovascular: Regular rate and rhythm with normal S1/S2 without murmurs, rubs or gallops. Abdomen: Soft, non-tender, non-distended with normal bowel sounds. Musculoskelatal: No clubbing, cyanosis or edema bilaterally. Skin: Skin color, texture, turgor normal.  No rashes or lesions. Neurologic: grossly non-focal.  Psychiatric: Alert, confused (?baseline)      Labs:   Recent Labs     10/17/22  0635 10/17/22  0710 10/18/22  0557 10/18/22  1831 10/19/22  0438   WBC 8.2  --  9.3  --  9.7   HGB 7.3*   < > 7.8* 7.6* 7.4*   HCT 21.5*   < > 23.2* 23.5* 23.0*   PLT 92*  --  195  --  198    < > = values in this interval not displayed. Recent Labs     10/17/22  0635 10/18/22  0557 10/19/22  0438    139 136   K 4.1 3.7 4.1    102 101   CO2 25 23 20*   BUN 42* 47* 29*   CREATININE 1.8* 2.2* 1.7*   CALCIUM 8.2* 8.4 8.2*   PHOS 1.7* 2.8 2.4*       No results for input(s): AST, ALT, BILIDIR, BILITOT, ALKPHOS in the last 72 hours. Recent Labs     10/17/22  0710   INR 2.50*       No results for input(s): Pamla Clines in the last 72 hours.     Studies:  CT ABDOMEN PELVIS WO CONTRAST Additional Contrast? None   Final Result   1. No evidence of intra-abdominal bleeding. 2.  Bilateral lower lobe opacities, atelectasis versus pneumonia. 3.  Small bilateral pleural effusions. XR CHEST PORTABLE   Final Result      Mild perihilar opacity with pulmonary vascular indistinctness may represent mild pulmonary edema. Bandlike opacity in the right midlung may represent atelectasis. Cardiomegaly. Assessment/Plan:  81 yo male with complex medical history who presented for hemodialysis due to lack of dialysis transport, has subsequently developed acute anemia. ESRD needing dialysis  Fluid overload  HD T/T/S  Continue Midodrine  Nephrology consulted  Social work consult      Acute anemia prob sec to GI bleed; on chronic anemia of CKD: POA  Acute anemia/GIB possibly sec to ulcer and anticioagulation  Received PRBC  CT abd/pelvis: no hemorrhage  Patient does have history of PUD earlier this summer. Family reports resolved. GI consulted. EGD: 1) small pyloric channel ulcer (5 mm) with intact clip in place from recent EGD. 2) no active upper GI bleeding source /traces of fresh blood identified on EGD. 3) Moderate diffuse gastritis. IV--> PO PPI  Advance if no further evidence of bleed    Monitor Hgb: Decrease freq of checks; iron studies reviewed. Folate mildly decreased. B12 WNL: Replenish Folate  Resume AC if/when OK with GI      P. 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 Initiated with cefepime and vancomycin on 8/24 then narrowed to cefazolin 2g q8h on 8/27. Repeat blood cultures negative. Due to hypotension was started on midodrine and BP meds were held. Required pressors in ICU for septic shock in ICU. Due to poor functional status patient was not a valvular surgical candidate; not candidate for TAVR or BAV due to AV vegetation. CT abd/pelvis and brain MRI without evidence of emboli. -Refused device explant  Ancef 2 g 10/12  Transitioned to Keflex per med/rec, chart review of ID recs for chronic suppression  Also on Rifabutin home dose  Will need follow-up with UC ID       Stage 3 wound on buttocks, heels: POA  Wound care consulted      Left leg edema: POA  - Undetermined etiology  - could eval with dopplers, but he is on [address] already  - Will also review records from recent hospital stays to see if was diagnosed with DVT  - Resume [address] if ok with GI      Prob Dementia: POA  High risk for delirium w/hx of insomnia  Delirium precautions      Hypotension  - Possibly multifactorial including valvular heart disease.   - Was a major issue at recent hospital stays, had endocarditis, required dobutamine and was placed then on midodrine TID and PRN with HD  - SBPs in the 80s  - Cont midodrine  - PRN albumin per Nephro  - Check AM cortisol  - Monitor BPs      Left leg edema  - Check for DVT noah ifprospect of inability to anticoagulate sec to bleed       DVT Prophylaxis: on Eliquis: held with concern for bleed. SCDs  PT/OT Eval Status: Ongoing  Diet: ADULT DIET; Full Liquid; no red foods, per family  ADULT ORAL NUTRITION SUPPLEMENT; Lunch, Dinner; Standard High Calorie/High Protein Oral Supplement  Code Status: Full Code      Disposition - Inpatient pending progress  Pxt is from Home. Recent stays in 2 OSH with life threatening disease processes. Pall Med consulted. Appreciate input.      If stable/no further issues, likely DC to SNF tomorrow if set up/bed available (Precert appears

## 2022-10-19 NOTE — PROGRESS NOTES
Phos level while here. Lab Results   Component Value Date    CALCIUM 8.2 (L) 10/19/2022    PHOS 2.4 (L) 10/19/2022         Acid/Base status:  Stable. Volume status/BP:  Hypotension. Patient has mild edema but no SOB at rest on room air. No acute volume overload concerns. Hematology:   CKD related anemia  Goal Hgb 10-11    Lab Results   Component Value Date    IRON 25 (L) 10/15/2022    TIBC 74 (L) 10/15/2022    FERRITIN 564.1 (H) 10/15/2022     Lab Results   Component Value Date    WBC 9.7 10/19/2022    HGB 7.4 (L) 10/19/2022    HCT 23.0 (L) 10/19/2022    MCV 89.0 10/19/2022     10/19/2022             Reason for Consult and Chief Complaint     Reason for consult: HENRY on HD    Chief complaint:   Chief Complaint   Patient presents with    Procedure     Via EMS from home, with c/o needing dialysis, states he goes on t, th, sat. States his first dialysis treatment was this past Saturday at [facility]. States he was not able to go there today to receive treatment. Pt denies cp, sob, fevers, chills. History of Present Illness   Deneimassiel Escalante is a 82 y.o. 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 Patient is accompanied by her wife. Patient is comfortable on room air and denied SOB, chest pain, fever or cough. Review of Systems   Positive in bold or unable to assess     GEN: Fever, chills, night sweats. HEENT: Changes in vision, sore throat, rhinorrhea   CVS: Chest pain, palpitations, swelling or edema in legs  Pulmonary: Cough, hemoptysis, SOB  GI: Nausea, vomiting, diarrhea, constipation, abdominal pain  : Bladder incontinence, dysuria,hematuria.    MSK: Muscle or joint or bone pains  Skin: Rashes, ulcers, skin thickness  CNS: Headache, dizziness, confusion, focal weakness, seizure. Psych: Anxiety, agitation, depression. Reviewed all 12 systems, negative except as above. Past Medical History        Afib (CMS Dx)   on Eliquis    Aortic stenosis    Chronic kidney disease (CKD)    ESRD (end stage renal disease) on dialysis (CMS Dx) 2022    HFrEF (heart failure with reduced ejection fraction) (CMS Dx)   EF 40%    HLD (hyperlipidemia)    Hypertension    STEMI (ST elevation myocardial infarction) (CMS Dx) 06/2022    Tachy-federico syndrome (CMS Dx)   s/p pacemaker 7/2022       Past Surgical History     Past Surgical History:   Procedure Laterality Date    UPPER GASTROINTESTINAL ENDOSCOPY N/A 10/17/2022    EGD BIOPSY performed by Ryann Grigsby MD at [address] History     History reviewed. No pertinent family history. Social History     Social History     Tobacco Use    Smoking status: Never    Smokeless tobacco: Never   Substance Use Topics    Alcohol use: Not Currently          Past medical, family, and social histories were reviewed as previously documented. Updates were made as necessary. Inpatient Medications and Allergies       Scheduled Meds:    Allergies: No Known Allergies      Vital Signs     Vitals:    10/19/22 0806   BP: (!) 93/49   Pulse: 86   Resp: 20   Temp: 97.4 °F (36.3 °C)   SpO2:          Intake/Output Summary (Last 24 hours) at 10/19/2022 0813  Last data filed at 10/19/2022 2989  Gross per 24 hour   Intake 230 ml   Output 100 ml   Net 130 ml           Physical Exam     General appearance: NAD  Head: Normocephalic, without obvious abnormality, atraumatic   Mouth: Moist mucous membranes. Neck: Supple. Lungs: Good air entry bilaterally. No respiratory distress on room air.    Heart: S1, S2.  Abdomen: soft, non-tender non-distended  Extremities: Mild leg edema  Skin: No concerning rashes noted  Psych: good eye contact, normal affect  Neuro: AAO x 3  TDC in place.        Laboratory Data     Please see above     Diagnostic Studies   Pertinent images reviewed

## 2022-10-19 NOTE — CARE COORDINATION
NIKIA spoke with Donny Toro at [facility] for updates. CM faxed run sheets from dialysis to [phone] for the dialysis to approve. They have another patient that will fill his HD bed if not admitting today. Donny Toro will call with updates.     Jose Kapadia RN, BSN,   4th Floor Progressive Care Unit  [phone]

## 2022-10-19 NOTE — PROGRESS NOTES
Occupational Therapy  Facility/Department: [place] [address] [facility]  Occupational Therapy Treatment    Name: Talib Perez  : 1935  MRN: [id number]  Date of Service: 10/19/2022    Discharge Recommendations: Talib Perez scored a  on the AM-PAC ADL Inpatient form. Current research shows that an AM-PAC score of 17 or less is typically not associated with a discharge to the patient's home setting. Based on the patient's AM-PAC score and their current ADL deficits, it is recommended that the patient have 3-5 sessions per week of Occupational Therapy at d/c to increase the patient's independence. Please see assessment section for further patient specific details. If patient discharges prior to next session this note will serve as a discharge summary. Please see below for the latest assessment towards goals. Patient Diagnosis(es): The primary encounter diagnosis was ESRD (end stage renal disease) on dialysis ([facility] [address] A diagnosis of Melena was also pertinent to this visit. Past Medical History:  has no past medical history on file. Past Surgical History:  has a past surgical history that includes Upper gastrointestinal endoscopy (N/A, 10/17/2022). Treatment Diagnosis: impaired functional mobility and ADL performance      Assessment   Performance deficits / Impairments: Decreased functional mobility ; Decreased endurance;Decreased ADL status; Decreased posture;Decreased ROM; Decreased balance;Decreased strength  Assessment: Pt from home. Prior to admission, pt was independent prior to 2022 w/ ADL's and used RW for mobility. Pt currently requiring max A x 2-3 for all mobility. Pt attempted 2 unsuccessful sit>stands to STEDY, required max x3 to scoot back in chair. Pt w/ decreased BUE ROM. Pt requiring set up for feeding and grooming tasks. OT recommending cont IP OT at d/c. Cont w/ POC.   Treatment Diagnosis: impaired functional mobility and ADL performance  Activity Tolerance  Activity Tolerance: Patient limited by fatigue  Activity Tolerance Comments: Pt limited by strength and fatigue        Plan   Occupational Therapy Plan  Times Per Week: 2-5  Times Per Day: Once a day  Current Treatment Recommendations: Strengthening, ROM, Balance training, Functional mobility training, Endurance training, Positioning, Patient/Caregiver education & training, Safety education & training, Self-Care / ADL     Restrictions  Position Activity Restriction  Other position/activity restrictions: Up with assistance    Subjective   General  Chart Reviewed: Yes  Patient assessed for rehabilitation services?: Yes  Additional Pertinent Hx: 82 y.o. 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 Just recently started dialysis and d/c'ed on 10/8 from [facility] following an admission for dialysis. Dialysis appointment was scheduled, but pt reports issues with transportation. Chest XR=Mild perihilar opacity with pulmonary vascular indistinctness may represent mild pulmonary edema. Bandlike opacity in the right midlung may represent atelectasis. Cardiomegaly. Referring Practitioner: Na Chatman DO  Diagnosis: ESRD needing dialysis  Subjective  Subjective: Pt seated in chair, wife present. Agreeable to OT treatment session.      Social/Functional History  Social/Functional History  Lives With: Spouse  Type of Home: House  Home Layout: One level  Home Access: Stairs to enter without rails  Entrance Stairs - Number of Steps: 2  Bathroom Shower/Tub: Walk-in shower, Tub/Shower unit  Bathroom Toilet: Standard  Bathroom Equipment: Grab bars around toilet  Home Equipment:  (micky lift)  Has the patient had two or more falls in the past year or any fall with injury in the past year?: Yes  Receives Help From: Family  ADL Assistance: Needs assistance (can put UE clothing on)  Feeding: Independent  Ambulation Assistance: Non-ambulatory (has been using W/C for past 3-4 months)  Transfer Assistance: Needs assistance (using micky)  Active : No  Patient's  Info: wife drives  Additional Comments: \"wife does 99.9%\" of things, help from son and daughter a few houses down; wife is always around. Pt it questionable historian this date with timeline of hospitalizations          Safety Devices  Type of Devices: Call light within reach; Chair alarm in place; Left in chair;Gait belt;Nurse notified  Restraints  Restraints Initially in Place: No  Bed Mobility Training  Bed Mobility Training: No  Transfer Training  Transfer Training: Yes  Overall Level of Assistance: Maximum assistance;Assist X2 (2 failed sit>stand attempts to STEDY. Max x3 to scoot back in chair)        ADL  Feeding: Setup  Feeding Skilled Clinical Factors: to open containters  Grooming: Supervision;Setup; Increased time to complete  Grooming Skilled Clinical Factors: Pt brushed teeth and wiped face w/ set-up and increased time in the chair  LE Dressing Skilled Clinical Factors: to don socks for transfer            THEREX  BUE exercises:   1x10:   bicep flex/extension  shoulder abduction/adduction  finger flex/extension                  Education Given To: Patient  Education Provided: Role of Therapy;Plan of Care;Transfer Training  Education Method: Verbal;Demonstration  Education Outcome: Continued education needed;Verbalized understanding      AM-PAC Score        AM-[facility] Inpatient Daily Activity Raw Score: 12 (10/19/22 1100)  AM-PAC Inpatient ADL T-Scale Score : 30.6 (10/19/22 1100)  ADL Inpatient CMS 0-100% Score: 66.57 (10/19/22 1100)  ADL Inpatient CMS G-Code Modifier : CL (10/19/22 1100)        Goals  Short Term Goals  Time Frame for Short Term Goals: by discharge  Short Term Goal 1: Pt will demonstrate sitting tolerance ~5 mins with Min A- progressing 10/19  Short Term Goal 2: Pt will perform UE dressing with Min A for line management- not addressed 10/19  Short Term Goal 3: Pt will perform bed mobility wit Mod x2 to reduce caregiver burden- not addressed 10/19  Short Term Goal 4: Pt will verbalize 3 pressure relief techniques to improve skin integrity and wound healing- not addressed 10/19  Patient Goals   Patient goals : to walk again       Therapy Time   Individual Concurrent Group Co-treatment   Time In 0930         Time Out 1008         Minutes 38           Total Treatment Minutes:  [address], OTS  Therapist was present, directed the patients care, made skilled judgement and was responsible for assessment and treatment of the patient.     Carter Henning, MOT, OTR/L

## 2022-10-19 NOTE — PROGRESS NOTES
Pt's BP 86/45, HR 87. Pt asymptomatic. On-call hospitalist notified. Electronically signed by Jaycob Oconnell RN on 10/18/2022 at 10:36 PM    [phone] - Communication received from on-call hospitalist to notify MD for SBP <80 or if pt becomes symptomatic.  Electronically signed by Jaycob Oconnell RN on 10/19/2022 at 2:19 AM

## 2022-10-19 NOTE — PROGRESS NOTES
Physical Therapy  Facility/Department: [place] [address] [facility]  Physical Therapy Daily Treatment    Name: Michael Miranda  : 1935  MRN: [id number]  Date of Service: 10/19/2022    Discharge Recommendations:    Michael Miranda scored a  on the AM-PAC short mobility form. Current research shows that an AM-PAC score of 17 or less is typically not associated with a discharge to the patient's home setting. Based on the patient's AM-PAC score and their current functional mobility deficits, it is recommended that the patient have 3-5 sessions per week of Physical Therapy at d/c to increase the patient's independence. Please see assessment section for further patient specific details. If patient discharges prior to next session this note will serve as a discharge summary. Please see below for the latest assessment towards goals. PT Equipment Recommendations  Equipment Needed: No  Other: defer      Patient Diagnosis(es): The primary encounter diagnosis was ESRD (end stage renal disease) on dialysis ([facility] [address] A diagnosis of Melena was also pertinent to this visit. Past Medical History:  has no past medical history on file. Past Surgical History:  has a past surgical history that includes Upper gastrointestinal endoscopy (N/A, 10/17/2022). Assessment   Body Structures, Functions, Activity Limitations Requiring Skilled Therapeutic Intervention: Decreased functional mobility ; Decreased strength;Decreased endurance;Decreased balance  Assessment: Pt continues to demo signifcantly below baseline for all functional mobility, transfers and bed mobility. Pt continues to have limited to no initation of any muscle groups for standing. Two failed attempts to stand from recliner to [address] [place] [address] with max A x2 this session. Pt was Dependent for scooting back into recliner, use of maxi move to transfer OOB.  PT continues to rec the pt have conitnued skilled PT in order to improve his functional mobility, transfers and safety before return to home. IF pt is to return home, 24 hr capable assistance is needed, 24hr aides and hhpt. Will continue to follow. Treatment Diagnosis: Impaired functional mobility  Therapy Prognosis: Good  Decision Making: Medium Complexity  Requires PT Follow-Up: Yes  Activity Tolerance  Activity Tolerance: Patient limited by endurance; Patient limited by fatigue     Plan   Physcial Therapy Plan  General Plan:  (2-5)  Current Treatment Recommendations: Strengthening, Balance training, Gait training, Stair training, Functional mobility training, Transfer training, Endurance training, Patient/Caregiver education & training, Therapeutic activities  Safety Devices  Type of Devices: Call light within reach, Chair alarm in place, Left in chair, Gait belt, Nurse notified  Restraints  Restraints Initially in Place: No     Restrictions  Position Activity Restriction  Other position/activity restrictions: Up with assistance     Subjective   General Comment  Comments: Cleared to see by RN, states patient up in recliner via maxi move. Subjective  Subjective: pt sitting up in recliner on entry, agreeable to therapy. Denies pain. Wife present in room throughout session.          Social/Functional History  Social/Functional History  Lives With: Spouse  Type of Home: House  Home Layout: One level  Home Access: Stairs to enter without rails  Entrance Stairs - Number of Steps: 2  Bathroom Shower/Tub: Walk-in shower, Tub/Shower unit  Bathroom Toilet: Standard  Bathroom Equipment: Grab bars around toilet  Home Equipment:  (micky lift)  Has the patient had two or more falls in the past year or any fall with injury in the past year?: Yes  Receives Help From: Family  ADL Assistance: Needs assistance (can put UE clothing on)  Feeding: Independent  Ambulation Assistance: Non-ambulatory (has been using W/C for past 3-4 months)  Transfer Assistance: Needs assistance (using micky)  Active : No  Patient's  Info: wife drives  Additional Comments: \"wife does 99.9%\" of things, help from son and daughter a few houses down; wife is always around. Pt it questionable historian this date with timeline of hospitalizations  Vision/Hearing       Cognition   Cognition  Overall Cognitive Status: WFL     Objective   Heart Rate: 86  Heart Rate Source: Monitor  BP: (!) 93/49  BP Location: Left upper arm  BP Method: Automatic  Patient Position: Trendelenburg  MAP (Calculated): 63.67  Resp: 20  SpO2: 94 %  O2 Device: None (Room air)         Bed Mobility Training  Bed Mobility Training: No  Transfer Training  Transfer Training: Yes  Overall Level of Assistance: Maximum assistance;Assist X2 (2 failed sit>stand attempts to STEDY. Max x3 to scoot back in chair)     Transfers  Sit to Stand: Dependent/Total  Stand to Sit: Dependent/Total  Car Transfer: Dependent/Total  Comment: Attempted 2 STS from recliner to sarastedy, max cues for anterior translation, foot placement and hand placement on stedy. Pt unable to clear bottom from chair with max A x2. Req'd dep x3 to scoot hips back into chair after attempting STS. Exercise Treatment: Seated in recliner ankle pumps, LAQ, marches x10 each, unable to complete with full ROM. Bilateral passive HS stretch x30s each. AM-PAC Score  AM-PAC Inpatient Mobility Raw Score : 6 (10/19/22 1114)  AM-PAC Inpatient T-Scale Score : 23.55 (10/19/22 1114)  Mobility Inpatient CMS 0-100% Score: 100 (10/19/22 1114)  Mobility Inpatient CMS G-Code Modifier : CN (10/19/22 1114)        Goals  Short Term Goals  Time Frame for Short Term Goals: discharge - all ongoing  Short Term Goal 1: Sit<>supine Min A  Short Term Goal 2: Sit<>Stand with RW Mod A  Short Term Goal 3: Rolling right to left Min A  Patient Goals   Patient Goals :  To walk again       Education  Patient Education  Education Given To: Patient  Education Provided: Role of Therapy;Transfer Training;Plan of Care  Education Method: Demonstration  Barriers to Learning: None  Education Outcome: Verbalized understanding      Therapy Time   Individual Concurrent Group Co-treatment   Time In 0930         Time Out 1008         Minutes 38          Timed Code Treatment Minutes: 38    Total Treatment Minutes:38     If patient is discharged prior to next treatment, this note will serve as the discharge summary.     Anton Rudd PT, DPT, NCS, CSRS

## 2022-10-19 NOTE — PLAN OF CARE
Problem: Skin/Tissue Integrity  Goal: Absence of new skin breakdown  Description: 1. Monitor for areas of redness and/or skin breakdown  2. Assess vascular access sites hourly  3. Every 4-6 hours minimum:  Change oxygen saturation probe site  4. Every 4-6 hours:  If on nasal continuous positive airway pressure, respiratory therapy assess nares and determine need for appliance change or resting period. Outcome: Progressing  Note: Suraj skin assessment completed overnight, pt scored an 11. No areas of new skin breakdown noted. Pt had 1 episode overnight of incontinence. Perineal care provided after each incontinent episode. Pt assisted to turn and reposition every 2 hours and as needed. Problem: Cardiovascular - Adult  Goal: Maintains optimal cardiac output and hemodynamic stability  Outcome: Not Progressing  Flowsheets (Taken 10/19/2022 7465)  Maintains optimal cardiac output and hemodynamic stability:   Monitor blood pressure and heart rate   Monitor urine output and notify Licensed Independent Practitioner for values outside of normal range   Assess for signs of decreased cardiac output  Note: Pt had SBP consistently in the 80's overnight. Pt remained asymptomatic with no complaints of chest pain, lightheadedness/dizziness, or shortness of breath/dyspnea.

## 2022-10-19 NOTE — PROGRESS NOTES
Pt's spouse Dallas Magana called and updated on pt's current status and plan of care. All questions answered.  Electronically signed by Nancy De La Cruz RN on 10/19/2022 at 7:46 AM

## 2022-10-20 NOTE — CARE COORDINATION
This patient is possibly to be discharged tomorrow pending nephrology decision. Will, also, be dependent on bed availability at [facility] tomorrow. Patient receiving dialysis today. Donte Hunter at [facility] [address] to alert her that this patient will possibly be discharged tomorrow and that he will have his HD today. CM will continue to follow patient until discharge. Electronically signed by Ester Hurley RN on 10/20/2022 at 2:07 PM    Addendum:   Just received a call from [facility] Status[facility] and they are requesting a P2P before tomorrow (10/21) at 11:00 a.m. Cindi Snow Perfectserved Dr. Beni Vann and he is to call: [phone], opt. 5. CM will continue to follow patient until discharge.   Electronically signed by Ester Hurley RN on 10/20/2022 at 4:19 PM

## 2022-10-20 NOTE — PROGRESS NOTES
Wife at bedside and updates on patient's medical status and current treatment. All questions answered.

## 2022-10-20 NOTE — PROGRESS NOTES
Pt's spouse Ranjit Ac called and updated on pt's current status and plan of care.  Electronically signed by Stanley Morgan RN on 10/20/2022 at 8:03 AM

## 2022-10-20 NOTE — PROGRESS NOTES
Physical Therapy  Daily Treatment Note    Discharge Recommendations: Catherine Hebert scored a 6/24 on the AM-PAC short mobility form. Current research shows that an AM-PAC score of 17 or less is typically not associated with a discharge to the patient's home setting. Based on the patient's AM-PAC score and their current functional mobility deficits, it is recommended that the patient have 3-5 sessions per week of Physical Therapy at d/c to increase the patient's independence. Please see assessment section for further patient specific details. Assessment:  Pt with decreased activity tolerance this session. Inconsistently assisting with ROM exercises. Pt very weak and needing max assist for most leg exercises and 2 person assist for bed mobility and transfer attempts. Currently requiring lift equipment to get OOB. Would benefit from continued IP PT at D/C to maximize strength and function. Plan is for SNF. Equipment Needs: Defer to next level of care    Chart Reviewed: Yes     Other position/activity restrictions: Up with assistance   Additional Pertinent Hx: Pt is an 81 yo male that presents from home for diaylsis. Pt recent was released from [facility]: Mild perihilar opacity with pulmonary vascular indistinctness may represent mild pulmonary edema. Diagnosis: ESRD needing Dialysis   Treatment Diagnosis: Impaired functional mobility    Subjective: Pt in bed. Wife present. RN reports pt will have dialysis today, but not sure what time. Pt reports not feeling well today. Unable to elaborate, other than stating he is uncomfortable. At times, stating he feels like he's not breathing well. Asking therapist to adjust [facility] multiple times. Wife reports \"He gets like this sometimes. \"    Pain: No c/o voiced except some tightness with ROM ex.      Objective:    Exercises  LE ROM ex in bed:  2 sets x 5 reps B ankle pumps (very limited ROM actively), heel slides (max to dependent assist), SAQ (max to dependent assist), hip adductor squeezes with pillow, SLR (dependent), hip abd/add (dependent). 5 reps B gentle heel cord stretch. Pt tolerating to ~ neutral dorsiflexion. Other: Limited abduction tolerated due to c/o tightness. Other: Needing cues/encouragement for continued participation. Vitals  Pt on RA throughout session. Oxygen saturation mostly 94-99%, but occasionally briefly dipping as low as 82%. RN aware and addressing. Patient Education  Role of PT. Expressed understanding. Safety Devices  Pt left with needs in reach. In bed with wife present. RN updated. AM-PAC score  AM-PAC Inpatient Mobility Raw Score : 6  AM-PAC Inpatient T-Scale Score : 23.55  Mobility Inpatient CMS 0-100% Score: 100  Mobility Inpatient CMS G-Code Modifier : CN    Goals: (as determined and assessed by primary PT)  Time Frame for Short Term Goals: discharge - all ongoing  Short Term Goal 1: Sit<>supine Min A   Short Term Goal 2: Sit<>Stand with RW Mod A   Short Term Goal 3: Rolling right to left Min A          Plan:  Plan:  (2-5)  Current Treatment Recommendations: Strengthening, Balance training, Gait training, Stair training, Functional mobility training, Transfer training, Endurance training, Patient/Caregiver education & training, Therapeutic activities    Therapy Time    Individual  Concurrent  Group  Co-treatment    Time In  905            Time Out  935            Minutes  30              Timed Code Treatment Minutes: 30  Total Treatment Minutes: 30    Will continue per plan of care. If patient is discharged prior to next treatment, this note will serve as the discharge summary.     [place], [place] #9648

## 2022-10-20 NOTE — PLAN OF CARE
Problem: Pain  Goal: Verbalizes/displays adequate comfort level or baseline comfort level  10/20/2022 1043 by Aldo Aleman RN  Outcome: Progressing  Flowsheets  Taken 10/20/2022 1043  Verbalizes/displays adequate comfort level or baseline comfort level:   Encourage patient to monitor pain and request assistance   Administer analgesics based on type and severity of pain and evaluate response   Assess pain using appropriate pain scale   Implement non-pharmacological measures as appropriate and evaluate response   Notify Licensed Independent Practitioner if interventions unsuccessful or patient reports new pain  Taken 10/20/2022 0829  Verbalizes/displays adequate comfort level or baseline comfort level: Encourage patient to monitor pain and request assistance  Note: Discussed pharmacological and non-pharmacological interventions to assist with pain   10/20/2022 0000 by Gerson Salinas RN  Outcome: Progressing  Flowsheets (Taken 10/19/2022 2359)  Verbalizes/displays adequate comfort level or baseline comfort level:   Encourage patient to monitor pain and request assistance   Assess pain using appropriate pain scale   Administer analgesics based on type and severity of pain and evaluate response  Note: Thus far this shift pt has not had any complaints of pain. Problem: Skin/Tissue Integrity  Goal: Absence of new skin breakdown  Description: 1. Monitor for areas of redness and/or skin breakdown  2. Assess vascular access sites hourly  3. Every 4-6 hours minimum:  Change oxygen saturation probe site  4. Every 4-6 hours:  If on nasal continuous positive airway pressure, respiratory therapy assess nares and determine need for appliance change or resting period.   10/20/2022 1043 by Aldo Aleman RN  Outcome: Progressing  Note: Assisting with [id number], repositioning, elevating pressure points, and utilizing specialty bed  10/20/2022 0000 by Gerson Salinas RN  Outcome: Progressing  Note: Suraj skin assessment completed overnight, pt scored an 11. No areas of new skin breakdown noted. Pt has been incontinent of stool at times. Male external catheter in place. Perineal care provided after each incontinent episode and as needed. Pt assisted to turn and reposition every 2 hours and as needed. Dressings to R heel and upper and lower buttocks changed per MD order.       Problem: Safety - Adult  Goal: Free from fall injury  Outcome: Progressing  Flowsheets  Taken 10/20/2022 1043  Free From Fall Injury: Instruct family/caregiver on patient safety  Taken 10/20/2022 1040  Free From Fall Injury: Instruct family/caregiver on patient safety  Note: Discussed pharmacological and non-pharmacological interventions to promote patient safety

## 2022-10-20 NOTE — PROGRESS NOTES
Nephrology Consult Note        [facility]    [email or web address] [employer]       Phone: [phone]                                                  10/20/2022 12:19 PM     Patient: Carter Viera [id number]  [id number]  Date of Admit: 10/11/2022 LOS: 9 days      Interval History and Plan:  Patient seen at bedside with his wife  Patient comfortable but lethargic  No SOB on room air  No chest pain  BP remain low  1 liter urine output documented ? K mild low  Phos 2.4  Albumin 3.2  Cr up  Oral intake not good. Follow AM cortisol level  Dialysis today per TTS schedule. Keep even as no concern for volume overload. Monitor BP closely, Continue Midodrine  Will give 500 ml IVF and also IV albumin to improve BP. Epogen during dialysis  Getting IV iron  Monitor Hb and transfuse as needed  Replete phosphorus as needed. Improve nutrition. Avoid nephrotoxins  Monitor input, output and weight  Monitor labs and vitals closely  Renally dose all medications  Patient/family interested in home dialysis. Will arrange for education about home dialysis as outpatient. D/W patient/family      Thank you for allowing us to participate in this patient's care    In case of any question please call us at our 24 hour answering service [phone] or from 7 AM to 5 PM via [employer] or cell number    Shannan Lemon MD  [facility] Nephrology  [facility] [address], [address]  Fax: [phone]  Office: [phone]       Assessment & Plan     Renal function:  Acute Kidney Injury and started on Hemodialysis and discharged on 10/5 from [facility] schedule  Access: [facility] No concern for infection. 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 Lab Results   Component Value Date    CALCIUM 8.5 10/20/2022    PHOS 2.4 (L) 10/20/2022    PHOS 2.4 (L) 10/20/2022    PHOS 2.4 (L) 10/20/2022         Acid/Base status:  Stable. Volume status/BP:  Hypotension. Patient has mild edema but no SOB at rest on room air. No acute volume overload concerns. Hematology:   CKD related anemia  Goal Hgb 10-11    Lab Results   Component Value Date    IRON 25 (L) 10/15/2022    TIBC 74 (L) 10/15/2022    FERRITIN 564.1 (H) 10/15/2022     Lab Results   Component Value Date    WBC 9.1 10/20/2022    HGB 7.1 (L) 10/20/2022    HCT 21.4 (L) 10/20/2022    MCV 85.0 10/20/2022     10/20/2022             Reason for Consult and Chief Complaint     Reason for consult: HENRY on HD    Chief complaint:   Chief Complaint   Patient presents with    Procedure     Via EMS from home, with c/o needing dialysis, states he goes on t, th, sat. States his first dialysis treatment was this past Saturday at [facility]. States he was not able to go there today to receive treatment. Pt denies cp, sob, fevers, chills. History of Present Illness   Taisha Dietrich is a 82 y.o. 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 Patient is accompanied by her wife. Patient is comfortable on room air and denied SOB, chest pain, fever or cough. Review of Systems   Positive in bold or unable to assess     GEN: Fever, chills, night sweats. HEENT: Changes in vision, sore throat, rhinorrhea   CVS: Chest pain, palpitations, swelling or edema in legs  Pulmonary: Cough, hemoptysis, SOB  GI: Nausea, vomiting, diarrhea, constipation, abdominal pain  : Bladder incontinence, dysuria,hematuria.    MSK: Muscle or joint or bone pains  Skin: Rashes, ulcers, skin thickness  CNS: Headache, dizziness, confusion, focal weakness, seizure. Psych: Anxiety, agitation, depression. Reviewed all 12 systems, negative except as above. Past Medical History        Afib (CMS Dx)   on Eliquis    Aortic stenosis    Chronic kidney disease (CKD)    ESRD (end stage renal disease) on dialysis (CMS Dx) 2022    HFrEF (heart failure with reduced ejection fraction) (CMS Dx)   EF 40%    HLD (hyperlipidemia)    Hypertension    STEMI (ST elevation myocardial infarction) (CMS Dx) 06/2022    Tachy-federico syndrome (CMS Dx)   s/p pacemaker 7/2022       Past Surgical History     Past Surgical History:   Procedure Laterality Date    UPPER GASTROINTESTINAL ENDOSCOPY N/A 10/17/2022    EGD BIOPSY performed by Fabienne Catherine MD at [address] History     History reviewed. No pertinent family history. Social History     Social History     Tobacco Use    Smoking status: Never    Smokeless tobacco: Never   Substance Use Topics    Alcohol use: Not Currently          Past medical, family, and social histories were reviewed as previously documented. Updates were made as necessary. Inpatient Medications and Allergies       Scheduled Meds:    Allergies: No Known Allergies      Vital Signs     Vitals:    10/20/22 1156   BP: (!) 89/45   Pulse: 89   Resp: 18   Temp: 98.1 °F (36.7 °C)   SpO2: 98%         Intake/Output Summary (Last 24 hours) at 10/20/2022 1219  Last data filed at 10/20/2022 9639  Gross per 24 hour   Intake 495 ml   Output 0 ml   Net 495 ml         Physical Exam     General appearance: NAD  Head: Normocephalic, without obvious abnormality, atraumatic   Mouth: Moist mucous membranes. Neck: Supple. Lungs: Good air entry bilaterally. No respiratory distress on room air.    Heart: S1, S2.  Abdomen: soft, non-tender non-distended  Extremities: Mild leg edema  Skin: No concerning rashes noted  Psych: good eye contact, normal affect  Neuro: AAO x 3  TDC in place.        Laboratory Data     Please see above     Diagnostic Studies   Pertinent images reviewed

## 2022-10-20 NOTE — PLAN OF CARE
Problem: Pain  Goal: Verbalizes/displays adequate comfort level or baseline comfort level  Outcome: Progressing  Flowsheets (Taken 10/19/2022 6997)  Verbalizes/displays adequate comfort level or baseline comfort level:   Encourage patient to monitor pain and request assistance   Assess pain using appropriate pain scale   Administer analgesics based on type and severity of pain and evaluate response  Note: Thus far this shift pt has not had any complaints of pain. Problem: Skin/Tissue Integrity  Goal: Absence of new skin breakdown  Description: 1. Monitor for areas of redness and/or skin breakdown  2. Assess vascular access sites hourly  3. Every 4-6 hours minimum:  Change oxygen saturation probe site  4. Every 4-6 hours:  If on nasal continuous positive airway pressure, respiratory therapy assess nares and determine need for appliance change or resting period. Outcome: Progressing  Note: Suraj skin assessment completed overnight, pt scored an 11. No areas of new skin breakdown noted. Pt has been incontinent of stool at times. Male external catheter in place. Perineal care provided after each incontinent episode and as needed. Pt assisted to turn and reposition every 2 hours and as needed. Dressings to R heel and upper and lower buttocks changed per MD order.

## 2022-10-20 NOTE — PROGRESS NOTES
Hospitalist Progress Note      PCP: Uvaldo Pride MD    Date of Admission: 10/11/2022      Chief Complaint:  ESRD needing dialysis, volume overload     82 y.o. male with past medical history as below, including STEMI 6./2022, tachy-federico syndrome s/p PPM 7/2022, Afib on Eliquis, systolic CHF EF 66-26%, aortic stenosis, recent MSSA bacteremia on Ancef, who presents today with inability to get to dialysis. He has a chair time but it appears his transportation for dialysis was not set-up. Aside from this he has not developed any new symptoms since recent discharge from . At that admission he also need dialysis due to social work issues. He refused explantation of his PPM and has been on Ancef with planned switch to Keflex tomorrow for chronic suppression        Subjective:   Pxt is confused, (?baseline), but is awake, denies specific complaints relayed, no signs of further bleeding noted. No abd pain    Appears tolerating clears    BPs improved but remain soft; requiring infusion of albumin ny nephrology    Wife at [facility] for EGD 10/18.    No bleeding noted        Medications:  Reviewed    Infusion Medications    sodium chloride      sodium chloride       Scheduled Medications    albumin human  25 g IntraVENous Q6H    potassium chloride  20 mEq Oral Once    heparin (porcine)  5,000 Units SubCUTAneous 3 times per day    iron sucrose  100 mg IntraVENous Daily    acetaminophen  650 mg Oral 3 times per day    Or    acetaminophen  650 mg Rectal 3 times per day    midodrine  10 mg Oral TID WC    pantoprazole  40 mg Oral BID AC    melatonin  5 mg Oral Nightly    epoetin destiny-epbx  10,000 Units IntraVENous Once per day on Tue Thu Sat    ferrous sulfate  325 mg Oral Daily with breakfast    Venelex   Topical BID    collagenase   Topical Daily    rifabutin  300 mg Oral Daily    sodium chloride flush  5-40 mL IntraVENous 2 times per day    cephALEXin  500 mg Oral Daily    [Held by provider] apixaban 2.5 mg Oral BID    polyethylene glycol  17 g Oral Daily    timolol  1 drop Left Eye BID    dorzolamide  1 drop Left Eye TID    traZODone  50 mg Oral Nightly    [Held by provider] aspirin  81 mg Oral Daily    atorvastatin  80 mg Oral Nightly     PRN Meds: heparin flush, medicated lip ointment, albumin human, heparin (porcine), sodium chloride flush, sodium chloride, ondansetron **OR** ondansetron, polyethylene glycol      Intake/Output Summary (Last 24 hours) at 10/20/2022 1224  Last data filed at 10/20/2022 0829  Gross per 24 hour   Intake 495 ml   Output 0 ml   Net 495 ml         Exam:    BP (!) 89/45   Pulse 89   Temp 98.1 °F (36.7 °C) (Oral)   Resp 18   Ht 5' 9\" (1.753 m)   Wt 203 lb 0.7 oz (92.1 kg)   SpO2 98%   BMI 29.98 kg/m²     General appearance: No apparent distress, appears stated age and cooperative. Neck: Supple, with full range of motion. No jugular venous distention. Respiratory:  Normal respiratory effort. Clear to auscultation, bilaterally without Rales/Wheezes/Rhonchi. Cardiovascular: Regular rate and rhythm with normal S1/S2 without murmurs, rubs or gallops. Abdomen: Soft, non-tender, non-distended with normal bowel sounds. Musculoskelatal: No clubbing, cyanosis or edema bilaterally. Skin: Skin color, texture, turgor normal.  No rashes or lesions. Neurologic: grossly non-focal.  Psychiatric: Alert, confused (?baseline)      Labs:   Recent Labs     10/18/22  0557 10/18/22  1831 10/19/22  0438 10/19/22  1912 10/20/22  1021   WBC 9.3  --  9.7  --  9.1   HGB 7.8*   < > 7.4* 7.2* 7.1*   HCT 23.2*   < > 23.0* 21.7* 21.4*     --  198  --  224    < > = values in this interval not displayed.        Recent Labs     10/19/22  0438 10/19/22  0836 10/20/22  1021    136 136   K 4.1 3.7 3.3*    99 98*   CO2 20* 25 25   BUN 29* 30* 32*   CREATININE 1.7* 1.7* 2.2*   CALCIUM 8.2* 8.4 8.5   PHOS 2.4* 2.5 2.4*  2.4*  2.4*       No results for input(s): AST, ALT, BILIDIR, BILITOT, ALKPHOS in the last 72 hours. No results for input(s): INR in the last 72 hours. No results for input(s): Magalis Coombes in the last 72 hours. Studies:  CT ABDOMEN PELVIS WO CONTRAST Additional Contrast? None   Final Result   1. No evidence of intra-abdominal bleeding. 2.  Bilateral lower lobe opacities, atelectasis versus pneumonia. 3.  Small bilateral pleural effusions. XR CHEST PORTABLE   Final Result      Mild perihilar opacity with pulmonary vascular indistinctness may represent mild pulmonary edema. Bandlike opacity in the right midlung may represent atelectasis. Cardiomegaly. VL Extremity Venous Left    (Results Pending)           Assessment/Plan:  83 yo male with complex medical history who presented for hemodialysis due to lack of dialysis transport, has subsequently developed acute anemia. HENRY on CKD stage 4  ESRD: appears less likely  Pxt had 1 L UOP documented, if accurate, may be diuresising post recovery of HENRY? Joya Landin Will discuss with Nephrology  HD per nephrology: HD T/T/S schedule  Crystalloid, to help with BP as well  Continue Midodrine  Nephrology consulted  Social work consult      Acute anemia prob sec to GI bleed; on chronic anemia of CKD: POA  Acute anemia/GIB possibly sec to ulcer and anticioagulation  Received PRBC  CT abd/pelvis: no hemorrhage  Patient does have history of PUD earlier this summer. Family reports resolved. GI consulted. EGD: 1) small pyloric channel ulcer (5 mm) with intact clip in place from recent EGD. 2) no active upper GI bleeding source /traces of fresh blood identified on EGD. 3) Moderate diffuse gastritis. IV--> PO PPI BID  Tolerating diet   Monitor Hgb: Decrease freq of checks; iron studies reviewed. Folate mildly decreased. B12 WNL: Replenish Folate  Resume AC if/when OK with GI      P. 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 Initiated with cefepime and vancomycin on 8/24 then narrowed to cefazolin 2g q8h on 8/27. Repeat blood cultures negative. Due to hypotension was started on midodrine and BP meds were held. Required pressors in ICU for septic shock in ICU. Due to poor functional status patient was not a valvular surgical candidate; not candidate for TAVR or BAV due to AV vegetation. CT abd/pelvis and brain MRI without evidence of emboli. -Reportedly refused device explant  Ancef 2 g 10/12;  then transitioned to Keflex per med/rec, chart review of ID recs for chronic suppression  Also on Rifabutin home dose  Will need follow-up with UC ID       Stage 3 wound on buttocks, heels: POA  Wound care consulted      Left leg edema: POA  - Undetermined etiology  - R/o DVT , noah if unable to anticoagulate  - Resume OAC if ok with GI      Prob Dementia: POA  High risk for delirium w/hx of insomnia  Delirium precautions      Hypotension  - Possibly multifactorial including valvular heart disease.   - Was a major issue at recent hospital stays, had endocarditis, required dobutamine gtt/ICU stay and was placed then on midodrine TID and PRN with HD  - SBPs in the 80s- 90s  - AM cortisol: not low  - 500 ml IVF and also IV albumin to improve BP  - RYLAN Hose  - Cont midodrine  - PRN albumin per Nephro/with dialysis  - Monitor BPs           DVT Prophylaxis: on Eliquis: held with concern for bleed.  SCDs: Resume if/when ok with GI.  PT/OT Eval Status: Ongoing  Diet: ADULT ORAL NUTRITION SUPPLEMENT; Lunch, Dinner; Standard High Calorie/High Protein Oral Supplement  ADULT DIET; Regular; Low Sodium (2 gm); Low Potassium (Less than 3000 mg/day); Low Phosphorus (Less than 1000 mg)  Code Status: Full Code      Disposition - Inpatient pending progress  Pxt is from Home. Recent stays in 2 OSH with life threatening disease processes. Pall Med consulted. Appreciate input. If stable/no further issues, likely DC to SNF tomorrow if set up/bed available (Precert appears extended). I do not anticipate his BP will increase much, noah with recent hx, and he remains high risk for morbidity and mortality as well as high re-admission risk. Family ultimately want to take him home from SNF, if O/p HD (which appears has been an issue after recent Hospitalizations at OSH) is well and reliably set up and this may not be a bad idea, if he goes home with Pall/med/Hospice care. HD today  If going to SNF, possible DC tomorrow if ok with Nephrology.      Santos Moya MD

## 2022-10-21 NOTE — PLAN OF CARE
Problem: Pain  Goal: Verbalizes/displays adequate comfort level or baseline comfort level  Note: Patient c/o discomfort in bed overnight, yajaira Tylenol given per order. Repositioned frequently with pillows and wedges, specialty air mattress in use. Will continue to monitor. Guy Frey RN 10/21/2022      Problem: Skin/Tissue Integrity  Goal: Absence of new skin breakdown  Description: 1. Monitor for areas of redness and/or skin breakdown  2. Assess vascular access sites hourly  3. Every 4-6 hours minimum:  Change oxygen saturation probe site  4. Every 4-6 hours:  If on nasal continuous positive airway pressure, respiratory therapy assess nares and determine need for appliance change or resting period. Note: Patient has multiple wounds to buttocks and heels, cleansed as needed and Rx creams applied. Large buttock wound covered in foam dressing to help stay clean of stool. Turned frequently overnight d/t discomfort, specialty air mattress in use. Will continue skin care protocol. Guy Frey RN 10/21/2022      Problem: Safety - Adult  Goal: Free from fall injury  Note: Patient remained free from falls overnight. Made no attempts to get out of bed overnight. Bed alarm remains active, bed in low/locked position, non-skid footwear in place, and call light in reach. Will continue fall risk protocol. Guy Frey RN 10/21/2022      Problem: Cardiovascular - Adult  Goal: Maintains optimal cardiac output and hemodynamic stability  Note: BP remained hypotensive overnight, checked frequently but did not require any new interventions. IV Albumin given when patient returned from dialysis. Remaining vitals stable.     Guy Frey RN 10/21/2022

## 2022-10-21 NOTE — PLAN OF CARE
Problem: Pain  Goal: Verbalizes/displays adequate comfort level or baseline comfort level  10/21/2022 1724 by Umesh Castle RN  Outcome: Progressing  Flowsheets  Taken 10/21/2022 1724  Verbalizes/displays adequate comfort level or baseline comfort level:   Encourage patient to monitor pain and request assistance   Administer analgesics based on type and severity of pain and evaluate response   Assess pain using appropriate pain scale   Implement non-pharmacological measures as appropriate and evaluate response   Notify Licensed Independent Practitioner if interventions unsuccessful or patient reports new pain  Taken 10/21/2022 1714  Verbalizes/displays adequate comfort level or baseline comfort level: Encourage patient to monitor pain and request assistance  Taken 10/21/2022 0739  Verbalizes/displays adequate comfort level or baseline comfort level: Encourage patient to monitor pain and request assistance  Note: Discussed pharmacological and non-pharmacological interventions to assist with pain  10/21/2022 0703 by Josefa Stewart RN  Note: Patient c/o discomfort in bed overnight, yajaira Tylenol given per order. Repositioned frequently with pillows and wedges, specialty air mattress in use. Will continue to monitor. Josefa Stewart RN 10/21/2022      Problem: Skin/Tissue Integrity  Goal: Absence of new skin breakdown  Description: 1. Monitor for areas of redness and/or skin breakdown  2. Assess vascular access sites hourly  3. Every 4-6 hours minimum:  Change oxygen saturation probe site  4. Every 4-6 hours:  If on nasal continuous positive airway pressure, respiratory therapy assess nares and determine need for appliance change or resting period.   10/21/2022 1724 by Umesh Castle RN  Outcome: Progressing  Note: Assisting with turning every 2 hours, elevating heels, and specialty bed in use   10/21/2022 0703 by Josefa Stewart RN  Note: Patient has multiple wounds to buttocks and heels, cleansed as needed and Rx creams applied. Large buttock wound covered in foam dressing to help stay clean of stool. Turned frequently overnight d/t discomfort, specialty air mattress in use. Will continue skin care protocol. Tereza Shine RN 10/21/2022      Problem: Safety - Adult  Goal: Free from fall injury  10/21/2022 1724 by Ceci Davenport RN  Outcome: Progressing  Flowsheets  Taken 10/21/2022 1724  Free From Fall Injury: Instruct family/caregiver on patient safety  Taken 10/21/2022 0817  Free From Fall Injury: Instruct family/caregiver on patient safety  Note: Discussed falls precautions and interventions used to promote patient safety  10/21/2022 0703 by Tereza Shine RN  Note: Patient remained free from falls overnight. Made no attempts to get out of bed overnight. Bed alarm remains active, bed in low/locked position, non-skid footwear in place, and call light in reach. Will continue fall risk protocol.      Tereza Shine RN 10/21/2022

## 2022-10-21 NOTE — PROGRESS NOTES
Nephrology Consult Note        [facility]    [email or web address]       Phone: [phone]                                                  10/21/2022 2:37 PM     Patient: Hong Carlisle [id number]  [id number]  Date of Admit: 10/11/2022 LOS: 10 days      Interval History and Plan:  Patient seen at bedside with his daughter  Patient comfortable but lethargic  Was feeling SOB but comfortable now on 1 liter/min oxygen. No chest pain  BP remain soft but stable. Urine output 150 ml + 1 time   Phos low  Poor oral intake  Rest of lytes stable  Hb was 7.1 yesterday  AM cortisol fine      CXR pneumonia? Dialysis tomorrow per TTS schedule. Monitor BP closely, Continue Midodrine  Will plan for blood transfusion with dialysis tomorrow  Epogen during dialysis. S/P IV Iron  Monitor Hb and transfuse as needed  Replete phosphorus as needed. Improve nutrition. Avoid nephrotoxins  Monitor input, output and weight  Monitor labs and vitals closely  Renally dose all medications  Patient/family interested in home dialysis. Will arrange for education about home dialysis as outpatient. D/W patient/family      Thank you for allowing us to participate in this patient's care    In case of any question please call us at our 24 hour answering service [phone] or from 7 AM to 5 PM via [employer] or cell number    Jaxon Aragon MD  [facility]  [address], [address]  Fax: [phone]  Office: [phone]       Assessment & Plan     Renal function:  Acute Kidney Injury and started on Hemodialysis and discharged on 10/5 from [facility] schedule  Access: [facility]. No concern for infection.        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   Lab Results   Component Value Date    CALCIUM 8.2 (L) 10/21/2022    PHOS 1.8 (L) 10/21/2022         Acid/Base status:  Stable. Volume status/BP:  Hypotension. Patient has mild edema but no SOB at rest on room air. No acute volume overload concerns. Hematology:   CKD related anemia  Goal Hgb 10-11    Lab Results   Component Value Date    IRON 25 (L) 10/15/2022    TIBC 74 (L) 10/15/2022    FERRITIN 564.1 (H) 10/15/2022     Lab Results   Component Value Date    WBC 9.1 10/20/2022    HGB 7.1 (L) 10/20/2022    HCT 21.7 (L) 10/20/2022    MCV 85.0 10/20/2022     10/20/2022             Reason for Consult and Chief Complaint     Reason for consult: HENRY on HD    Chief complaint:   Chief Complaint   Patient presents with    Procedure     Via EMS from home, with c/o needing dialysis, states he goes on t, th, sat. States his first dialysis treatment was this past Saturday at [facility]. States he was not able to go there today to receive treatment. Pt denies cp, sob, fevers, chills. History of Present Illness   Angela Dominguez is a 82 y.o. 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 Patient is accompanied by her wife. Patient is comfortable on room air and denied SOB, chest pain, fever or cough. Review of Systems   Positive in bold or unable to assess     GEN: Fever, chills, night sweats. HEENT: Changes in vision, sore throat, rhinorrhea   CVS: Chest pain, palpitations, swelling or edema in legs  Pulmonary: Cough, hemoptysis, SOB  GI: Nausea, vomiting, diarrhea, constipation, abdominal pain  : Bladder incontinence, dysuria,hematuria.    MSK: Muscle or joint or bone pains  Skin: Rashes, ulcers, skin thickness  CNS: Headache, dizziness, confusion, focal weakness, seizure. Psych: Anxiety, agitation, depression. Reviewed all 12 systems, negative except as above. Past Medical History        Afib (CMS Dx)   on Eliquis    Aortic stenosis    Chronic kidney disease (CKD)    ESRD (end stage renal disease) on dialysis (CMS Dx) 2022    HFrEF (heart failure with reduced ejection fraction) (CMS Dx)   EF 40%    HLD (hyperlipidemia)    Hypertension    STEMI (ST elevation myocardial infarction) (CMS Dx) 06/2022    Tachy-federico syndrome (CMS Dx)   s/p pacemaker 7/2022       Past Surgical History     Past Surgical History:   Procedure Laterality Date    UPPER GASTROINTESTINAL ENDOSCOPY N/A 10/17/2022    EGD BIOPSY performed by Asael Melchor MD at [address] History     History reviewed. No pertinent family history. Social History     Social History     Tobacco Use    Smoking status: Never    Smokeless tobacco: Never   Substance Use Topics    Alcohol use: Not Currently          Past medical, family, and social histories were reviewed as previously documented. Updates were made as necessary. Inpatient Medications and Allergies       Scheduled Meds:    Allergies: No Known Allergies      Vital Signs     Vitals:    10/21/22 1320   BP: 103/64   Pulse: 83   Resp:    Temp:    SpO2:          Intake/Output Summary (Last 24 hours) at 10/21/2022 1437  Last data filed at 10/21/2022 1002  Gross per 24 hour   Intake 505 ml   Output 150 ml   Net 355 ml           Physical Exam     General appearance: NAD  Head: Normocephalic, without obvious abnormality, atraumatic   Mouth: Moist mucous membranes. Neck: Supple. Lungs: Good air entry bilaterally. No respiratory distress on 1 liter oxygen  Heart: S1, S2.  Abdomen: soft, non-tender non-distended  Extremities: Mild leg edema  Skin: No concerning rashes noted  Psych: good eye contact, normal affect  Neuro: AAO x 3  TDC in place.        Laboratory Data Please see above     Diagnostic Studies   Pertinent images reviewed

## 2022-10-21 NOTE — PROGRESS NOTES
Hospitalist Progress Note      PCP: Maru Avila MD    Date of Admission: 10/11/2022      Chief Complaint:  ESRD needing dialysis, volume overload     82 y.o. male with past medical history as below, including STEMI 6./2022, tachy-federico syndrome s/p PPM 7/2022, Afib on Eliquis, systolic CHF EF 91-59%, aortic stenosis, recent MSSA bacteremia on Ancef, who presents today with inability to get to dialysis. He has a chair time but it appears his transportation for dialysis was not set-up. Aside from this he has not developed any new symptoms since recent discharge from . At that admission he also need dialysis due to social work issues. He refused explantation of his PPM and has been on Ancef with planned switch to Keflex tomorrow for chronic suppression        Subjective:   Pxt is awake, daughter at bedside    States he is short of breath    Has had fluid boluses for low Bps; and compression applied to Les: BPs improved    Also some chest pressure    No chest pain    Still not eating much per family member at bedside     No abd pain    Went for EGD 10/18.    No bleeding noted; but still has dark stools  Per GI: holding eliquis : \"2 weeks\"        Medications:  Reviewed    Infusion Medications    sodium chloride       Scheduled Medications    sodium phosphate IVPB  20 mmol IntraVENous Once    lactated ringers bolus  500 mL IntraVENous Once    heparin (porcine)  5,000 Units SubCUTAneous 3 times per day    acetaminophen  650 mg Oral 3 times per day    Or    acetaminophen  650 mg Rectal 3 times per day    midodrine  10 mg Oral TID WC    pantoprazole  40 mg Oral BID AC    melatonin  5 mg Oral Nightly    epoetin destiny-epbx  10,000 Units IntraVENous Once per day on Tue Thu Sat    ferrous sulfate  325 mg Oral Daily with breakfast    Venelex   Topical BID    collagenase   Topical Daily    rifabutin  300 mg Oral Daily    sodium chloride flush  5-40 mL IntraVENous 2 times per day    cephALEXin  500 mg Oral Daily [Held by provider] apixaban  2.5 mg Oral BID    polyethylene glycol  17 g Oral Daily    timolol  1 drop Left Eye BID    dorzolamide  1 drop Left Eye TID    traZODone  50 mg Oral Nightly    [Held by provider] aspirin  81 mg Oral Daily    atorvastatin  80 mg Oral Nightly     PRN Meds: heparin flush, medicated lip ointment, albumin human, heparin (porcine), sodium chloride flush, sodium chloride, ondansetron **OR** ondansetron, polyethylene glycol      Intake/Output Summary (Last 24 hours) at 10/21/2022 1309  Last data filed at 10/21/2022 1002  Gross per 24 hour   Intake 505 ml   Output 150 ml   Net 355 ml         Exam:    /60   Pulse 88   Temp 97.5 °F (36.4 °C) (Oral)   Resp 18   Ht 5' 9\" (1.753 m)   Wt 203 lb 0.7 oz (92.1 kg)   SpO2 98%   BMI 29.98 kg/m²     General appearance: No apparent distress, appears stated age and cooperative. Neck: Supple, with full range of motion. No jugular venous distention. Respiratory:  Normal respiratory effort. Clear to auscultation, bilaterally without Rales/Wheezes/Rhonchi. Cardiovascular: Regular rate and rhythm with normal S1/S2 without murmurs, rubs or gallops. Abdomen: Soft, non-tender, non-distended with normal bowel sounds. Musculoskelatal: No clubbing, cyanosis or edema bilaterally. Skin: Skin color, texture, turgor normal.  No rashes or lesions. Neurologic: grossly non-focal.  Psychiatric: Alert, confused (?baseline)      Labs:   Recent Labs     10/19/22  0438 10/19/22  1912 10/20/22  1021 10/20/22  1607   WBC 9.7  --  9.1  --    HGB 7.4* 7.2* 7.1* 7.1*   HCT 23.0* 21.7* 21.4* 21.7*     --  224  --        Recent Labs     10/19/22  0836 10/20/22  1021 10/21/22  0521    136 134*   K 3.7 3.3* 4.8   CL 99 98* 102   CO2 25 25 21   BUN 30* 32* 18   CREATININE 1.7* 2.2* 1.5*   CALCIUM 8.4 8.5 8.2*   PHOS 2.5 2.4*  2.4*  2.4* 1.8*       No results for input(s): AST, ALT, BILIDIR, BILITOT, ALKPHOS in the last 72 hours.     No results for input(s): INR in the last 72 hours. No results for input(s): Nadya Childs in the last 72 hours. Studies:  VL Extremity Venous Left         CT ABDOMEN PELVIS WO CONTRAST Additional Contrast? None   Final Result   1. No evidence of intra-abdominal bleeding. 2.  Bilateral lower lobe opacities, atelectasis versus pneumonia. 3.  Small bilateral pleural effusions. XR CHEST PORTABLE   Final Result      Mild perihilar opacity with pulmonary vascular indistinctness may represent mild pulmonary edema. Bandlike opacity in the right midlung may represent atelectasis. Cardiomegaly. Assessment/Plan:  81 yo male with complex medical history who presented for hemodialysis due to lack of dialysis transport, has subsequently developed acute anemia. HENRY on CKD stage 4  ESRD: appears less likely  Pxt had 1 L UOP documented, if accurate, may be diuresising post recovery of HENRY? [place] Will discuss with Nephrology  HD per nephrology: HD T/T/S schedule  Crystalloid, to help with BP as well  Continue Midodrine  Nephrology consulted: continue monitoring for renal recovery  Social work consult      Acute anemia prob sec to GI bleed; on chronic anemia of CKD: POA  Acute anemia/GIB possibly sec to ulcer and anticioagulation  Received PRBC  CT abd/pelvis: no hemorrhage  Patient does have history of PUD earlier this summer. Family reports resolved. GI consulted. EGD: 1) small pyloric channel ulcer (5 mm) with intact clip in place from recent EGD. 2) no active upper GI bleeding source /traces of fresh blood identified on EGD. 3) Moderate diffuse gastritis. IV--> PO PPI BID  Tolerating diet   Monitor Hgb:  Iron studies reviewed. Folate mildly decreased. B12 WNL: Replenishing Folate  Per GI: Hold AC \"for 2 weeks\"  Monitor hgb and transfuse as needed to keep > 7      P. Afib: POA  Tachy federico syndrome s/p PPM s/p PPM  - Paced rhythm on telemetry  - Eliquis 2.5 mg BID: held with GIB: Per GI: Hold AC \"for 2 weeks\"      CAD, hx of STEMI  On statin,  ASA  Per records appears is off ASA 81 mg - was stopped (received 2 doses): Attempt to clarify why off; Defer to Cardiologists       Recent MSSA bacteremia, endocarditis  Significant recent hospitalization including ICU stays, need for Pressors etc  For Septic shock with MSSA bacteremia  Infective endocarditis, AYDEE showed aortic valve vegetations. Initiated with cefepime and vancomycin on 8/24 then narrowed to cefazolin 2g q8h on 8/27. Repeat blood cultures negative. Due to hypotension was started on midodrine and BP meds were held. Required pressors in ICU for septic shock in ICU. Due to poor functional status patient was not a valvular surgical candidate; not candidate for TAVR or BAV due to AV vegetation. CT abd/pelvis and brain MRI without evidence of emboli. -Reportedly refused device explant  Ancef 2 g 10/12;  then transitioned to Keflex per med/rec, chart review of ID recs for chronic suppression  Also on Rifabutin home dose  Will need follow-up with UC ID       Stage 3 wound on buttocks, heels: POA  Wound care consulted      Left leg edema: POA  - No DVT   - Resume OAC if ok with GI      Prob Dementia: POA  High risk for delirium w/hx of insomnia  Delirium precautions      Hypotension  - Possibly multifactorial including valvular heart disease.   - Was a major issue at recent hospital stays, had endocarditis, required dobutamine gtt/ICU stay and was placed then on midodrine TID and PRN with HD  - AM cortisol: not low  - PRN boluses crystalloids and - IV albumin to improve BP  - RYLAN Hose  - Cont midodrine  - PRN albumin per Nephro/with dialysis  - Monitor BPs           DVT Prophylaxis: on Eliquis: held with concern for bleed. SCDs: Resume if/when ok with GI.  PT/OT Eval Status: Ongoing  Diet: ADULT DIET; Regular; Low Sodium (2 gm); Low Potassium (Less than 3000 mg/day);  Low Phosphorus (Less than 1000 mg)  ADULT ORAL NUTRITION SUPPLEMENT; Lunch, Dinner, Breakfast; Standard High Calorie/High Protein Oral Supplement  ADULT ORAL NUTRITION SUPPLEMENT; Lunch, Dinner; Frozen Oral Supplement  Code Status: Full Code      Disposition - Inpatient pending progress  Pxt is from Home. Recent stays in 2 OSH with life threatening disease processes. Pall Med consulted. Appreciate input. If stable/no further issues, likely DC to SNF tomorrow if set up/bed available (Precert appears extended). I do not anticipate his BP will increase much, noah with recent hx, and he remains high risk for morbidity and mortality as well as high re-admission risk. Family ultimately want to take him home from SNF, if O/p HD (which appears has been an issue after recent Hospitalizations at OSH) is well and reliably set up and this may not be a bad idea, if he goes home with Pall/med/Hospice care. Peer to Peer done: SNF approved:  To [facility], possible DC tomorrow if nil new, after HD    Kip Cabrera MD

## 2022-10-21 NOTE — CARE COORDINATION
Plan ID # [id number]  Auth ID # [id number]  SNF  10/20/2022  10/21/2022-10/25/2022  Approved    CM spoke with Que Doty at [facility]. They will plan to admit patient tomorrow after he has dialysis at [facility]. NIKIA arranged transport for 1430 on Saturday 10/22 via [facility].       Jim Akins RN, BSN,   4th Floor Progressive Care Unit  [phone]

## 2022-10-21 NOTE — PROGRESS NOTES
Comprehensive Nutrition Assessment    Type and Reason for Visit:  Reassess    Nutrition Recommendations/Plan:   Diet; Regular Renal Diet  Modify ONS; Ensure Enlive TID and Magic cup BID     Malnutrition Assessment:  Malnutrition Status: At risk for malnutrition (Comment) (10/21/22 1312)    Context:  Acute Illness           Nutrition Assessment:    Follow up: Pt on a Regular Diet with Renal Modifications. PO intake of meals varied  from 1-75%. Nrsg reports pt taking Ensure Enlive supplement with much encouragement. Labs reviewed; K+ wnl, phos bnl. Current supplements appropriate based on lab values. Will increase Ensure supplement to Tid and trial magic cup for additional sherry/pro. Continue to follow. Nutrition Related Findings:    Na 134, K+ 4.8 wnl, phos 1.8. Glu 109. LBM(10/21). RUE/LUE trace edema, RLE/LLE +1 pitting edema. Wound Type: Pressure Injury, Stage II (PI to lt heel; US PI left upper buttock; St 2 PI left lower buttocks)       Current Nutrition Intake & Therapies:    Average Meal Intake: 1-25%, 26-50%, 51-75%, %  Average Supplements Intake: 51-75%, %  ADULT DIET; Regular; Low Sodium (2 gm); Low Potassium (Less than 3000 mg/day); Low Phosphorus (Less than 1000 mg)  ADULT ORAL NUTRITION SUPPLEMENT; Lunch, Dinner, Breakfast; Standard High Calorie/High Protein Oral Supplement  ADULT ORAL NUTRITION SUPPLEMENT; Lunch, Dinner; Frozen Oral Supplement    Anthropometric Measures:  Height: 5' 9\" (175.3 cm)  Ideal Body Weight (IBW): 160 lbs (73 kg)     Current Body Weight: 203 lb (92.1 kg), 129.4 % IBW. Current BMI (kg/m2): 30        Weight Adjustment For: No Adjustment           BMI Categories: Overweight (BMI 25.0-29. 9)    Estimated Daily Nutrient Needs:  Energy Requirements Based On: Kcal/kg (25-30 kcal/IBW/73 kg)  Energy (kcal/day): 6618-9411  Protein (g/day): 88-95  Method Used for Fluid Requirements: Standard Renal      Nutrition Diagnosis:   Inadequate oral intake related to inadequate protein-energy intake, increase demand for energy/nutrients as evidenced by intake 0-25%, intake 26-50%, intake 51-75%, wounds, other (comment) (HD)    Nutrition Interventions:   Food and/or Nutrient Delivery: Continue Current Diet, Modify Oral Nutrition Supplement  Nutrition Education/Counseling: No recommendation at this time  Coordination of Nutrition Care: Continue to monitor while inpatient       Goals:  Previous Goal Met: Progressing toward Goal(s)  Goals: PO intake 50% or greater, prior to discharge       Nutrition Monitoring and Evaluation:   Behavioral-Environmental Outcomes: None Identified  Food/Nutrient Intake Outcomes: Food and Nutrient Intake, Supplement Intake  Physical Signs/Symptoms Outcomes: Biochemical Data, Skin, Weight, Fluid Status or Edema    Discharge Planning:     Too soon to determine     Kiara Cost, [address]: [id number]

## 2022-10-22 NOTE — FLOWSHEET NOTE
Treatment time: 3.5 hrs     Net UF: 0    Pre weight: ROSENDA   Post weight: ROSENDA  EDW: tbd    Access used: R TDC  Access function: GOOD     Medications or blood products given: ALBUMIN 25GM, RETACRIT 20,000 UNITS    Regular outpatient schedule: TTS     Summary of response to treatment: PT Tolerated tx well. Kept volume even for BP support. Post VSS    Copy of dialysis treatment record placed in chart, to be scanned into EMR. 10/22/22 1330 10/22/22 1708   Vital Signs   BP (!) 83/42 (!) 90/56   Temp 97.8 °F (36.6 °C) 97.5 °F (36.4 °C)   Heart Rate 90 87   Tunneled Hemodialysis Catheter Right Subclavian   No placement date or time found.    Present on Admission/Arrival: Yes  Orientation: Right  Access Location: Subclavian   Site Assessment  --  Clean, dry & intact   Date of Last Dressing Change  --  10/22/22

## 2022-10-22 NOTE — PROGRESS NOTES
Nephrology Consult Note        [facility]    [email or web address]       Phone: [phone]                                                  10/22/2022 8:51 AM     Patient: Roya Rubio [id number]  [id number]  Date of Admit: 10/11/2022 LOS: 11 days      Interval History and Plan:  The patient is scheduled to to receive hemodialysis around noontime  The patient was examined, vital signs and lab works are reviewed  The patient most recent vital signs showed temperature 91/56, heart rate 89, pulse ox 100%  Potassium 5.3, bicarb 23    Hemoglobin 5.4    So we will keep the patient to schedule    The patient will require 1 unit of blood transfusion    I have reviewed the patient medications and I did not see any concerning medications I will change the patient Procrit dosage to 20,000  The patient TSAT was 34%, ferritin level was 561, therefore he is not iron deficient.     The patient was seen and examined in his room  The patient's wife is at his bedside  The patient was awake, but not much verbal  The patient appears to be reasonably comfortable  The patient looked pale and frail  The patient has bilateral leg edema, both legs wrapped with Ace bandage  The patient most recent set of vital signs showed temperature 97.7, heart rate 89 and blood pressure 91/56    Lab work from this morning showed sodium 139, potassium 5.3, bicarb 23, BUN 23, creatinine 2, calcium 7.8, albumin 2.7    WBC 9.1, hemoglobin 5.4 and the platelet was 57      Thank you for allowing us to participate in this patient's care    In case of any question please call us at our 24 hour answering service [phone] or from 7 AM to 5 PM via [facility] or cell number    Alicia Manuel MD  [facility] Nephrology  [address], [address]  Fax: [phone]  Office: [phone]       Assessment & Plan     Renal function:  Acute Kidney Injury and started on Hemodialysis and discharged on 10/5 from [facility] schedule  Access: TDC. No concern for infection. 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 Lab Results   Component Value Date    CALCIUM 7.8 (L) 10/22/2022    PHOS 2.9 10/22/2022         Acid/Base status:  Stable. Volume status/BP:  Hypotension. Patient has mild edema but no SOB at rest on room air. No acute volume overload concerns. Hematology:   CKD related anemia  Goal Hgb 10-11    Lab Results   Component Value Date    IRON 25 (L) 10/15/2022    TIBC 74 (L) 10/15/2022    FERRITIN 564.1 (H) 10/15/2022     Lab Results   Component Value Date    WBC 9.1 10/22/2022    HGB 5.4 (LL) 10/22/2022    HCT 16.5 (LL) 10/22/2022    MCV 86.9 10/22/2022    PLT 57 (L) 10/22/2022             Reason for Consult and Chief Complaint     Reason for consult: HENRY on HD    Chief complaint:   Chief Complaint   Patient presents with    Procedure     Via EMS from home, with c/o needing dialysis, states he goes on t, th, sat. States his first dialysis treatment was this past Saturday at [facility]. States he was not able to go there today to receive treatment. Pt denies cp, sob, fevers, chills. History of Present Illness   Tamiko Reyes is a 82 y.o. 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 Patient is accompanied by her wife. Patient is comfortable on room air and denied SOB, chest pain, fever or cough.        Review of Systems   Positive in bold or unable to assess     GEN: Fever, chills, night sweats. HEENT: Changes in vision, sore throat, rhinorrhea   CVS: Chest pain, palpitations, swelling or edema in legs  Pulmonary: Cough, hemoptysis, SOB  GI: Nausea, vomiting, diarrhea, constipation, abdominal pain  : Bladder incontinence, dysuria,hematuria. MSK: Muscle or joint or bone pains  Skin: Rashes, ulcers, skin thickness  CNS: Headache, dizziness, confusion, focal weakness, seizure. Psych: Anxiety, agitation, depression. Reviewed all 12 systems, negative except as above. Past Medical History        Afib (CMS Dx)   on Eliquis    Aortic stenosis    Chronic kidney disease (CKD)    ESRD (end stage renal disease) on dialysis (CMS Dx) 2022    HFrEF (heart failure with reduced ejection fraction) (CMS Dx)   EF 40%    HLD (hyperlipidemia)    Hypertension    STEMI (ST elevation myocardial infarction) (CMS Dx) 06/2022    Tachy-federico syndrome (CMS Dx)   s/p pacemaker 7/2022       Past Surgical History     Past Surgical History:   Procedure Laterality Date    UPPER GASTROINTESTINAL ENDOSCOPY N/A 10/17/2022    EGD BIOPSY performed by Jeramy Henderson MD at [address] History     History reviewed. No pertinent family history. Social History     Social History     Tobacco Use    Smoking status: Never    Smokeless tobacco: Never   Substance Use Topics    Alcohol use: Not Currently          Past medical, family, and social histories were reviewed as previously documented. Updates were made as necessary.       Inpatient Medications and Allergies       Scheduled Meds:    Allergies: No Known Allergies      Vital Signs     Vitals:    10/22/22 0340   BP: (!) 91/56   Pulse: 89   Resp: 19   Temp: 97.7 °F (36.5 °C)   SpO2: 100%         Intake/Output Summary (Last 24 hours) at 10/22/2022 0851  Last data filed at 10/22/2022 0340  Gross per 24 hour   Intake 490 ml   Output 75 ml   Net 415 ml           Physical Exam     General appearance: NAD  Head: Normocephalic, without obvious abnormality, atraumatic   Mouth: Moist mucous membranes. Neck: Supple. Lungs: Good air entry bilaterally. No respiratory distress on 1 liter oxygen  Heart: S1, S2.  Abdomen: soft, non-tender non-distended  Extremities: Mild leg edema  Skin: No concerning rashes noted  Psych: good eye contact, normal affect  Neuro: AAO x 3  TDC in place.        Laboratory Data     Please see above     Diagnostic Studies   Pertinent images reviewed

## 2022-10-22 NOTE — PROGRESS NOTES
Hospitalist Progress Note      PCP: Alexus Mayers MD    Date of Admission: 10/11/2022      Chief Complaint:  ESRD needing dialysis, volume overload     82 y.o. male with past medical history as below, including STEMI 6./2022, tachy-federico syndrome s/p PPM 7/2022, Afib on Eliquis, systolic CHF EF 49-41%, aortic stenosis, recent MSSA bacteremia on Ancef, who presents today with inability to get to dialysis. He has a chair time but it appears his transportation for dialysis was not set-up. Aside from this he has not developed any new symptoms since recent discharge from . At that admission he also need dialysis due to social work issues. He refused explantation of his PPM and has been on Ancef with planned switch to Keflex tomorrow for chronic suppression      Subjective:   Pxt is awake    Had fluid boluses for low Bps; and compression applied to LEs: BPs improved but remain soft: appears a chronic issue    No chest pain    Still not eating much per family member at bedside     No abd pain    Went for EGD 10/18. No bleeding noted; but still has dark stools  Per GI: holding eliquis : \"2 weeks\"    His hgb is lower today, but so are all cell lines, raising question to accuracy as an index of new decline in hgb. Planned transfusion at HD; should improve his overall wellbeing, and prior hgb was BL anyway at 7. Had a CXR 10/21: findings likely sec to fluid boluses he had received. No S/S of pneumonia at this time, but continues to be monitored.          Medications:  Reviewed    Infusion Medications    sodium chloride       Scheduled Medications    [START ON 10/25/2022] epoetin destiny-epbx  20,000 Units IntraVENous Once per day on Tue Thu Sat    heparin (porcine)  5,000 Units SubCUTAneous 3 times per day    acetaminophen  650 mg Oral 3 times per day    Or    acetaminophen  650 mg Rectal 3 times per day    midodrine  10 mg Oral TID WC    pantoprazole  40 mg Oral BID AC    melatonin  5 mg Oral Nightly ferrous sulfate  325 mg Oral Daily with breakfast    Venelex   Topical BID    collagenase   Topical Daily    rifabutin  300 mg Oral Daily    sodium chloride flush  5-40 mL IntraVENous 2 times per day    cephALEXin  500 mg Oral Daily    [Held by provider] apixaban  2.5 mg Oral BID    polyethylene glycol  17 g Oral Daily    timolol  1 drop Left Eye BID    dorzolamide  1 drop Left Eye TID    traZODone  50 mg Oral Nightly    [Held by provider] aspirin  81 mg Oral Daily    atorvastatin  80 mg Oral Nightly     PRN Meds: heparin flush, medicated lip ointment, albumin human, heparin (porcine), sodium chloride flush, sodium chloride, ondansetron **OR** ondansetron, polyethylene glycol      Intake/Output Summary (Last 24 hours) at 10/22/2022 0858  Last data filed at 10/22/2022 0340  Gross per 24 hour   Intake 490 ml   Output 75 ml   Net 415 ml         Exam:    BP (!) 91/56   Pulse 89   Temp 97.7 °F (36.5 °C) (Axillary)   Resp 19   Ht 5' 9\" (1.753 m)   Wt 203 lb 0.7 oz (92.1 kg)   SpO2 100%   BMI 29.98 kg/m²     General appearance: No apparent distress, appears stated age and cooperative. Neck: Supple, with full range of motion. No jugular venous distention. Respiratory:  Normal respiratory effort. Clear to auscultation, bilaterally without Rales/Wheezes/Rhonchi. Cardiovascular: Regular rate and rhythm with normal S1/S2 without murmurs, rubs or gallops. Abdomen: Soft, non-tender, non-distended with normal bowel sounds. Musculoskelatal: No clubbing, cyanosis or edema bilaterally. Skin: Skin color, texture, turgor normal.  No rashes or lesions.   Neurologic: grossly non-focal.  Psychiatric: Alert, confused (?baseline)      Labs:   Recent Labs     10/20/22  1021 10/20/22  1607 10/21/22  1648 10/22/22  0600   WBC 9.1  --  10.9 9.1   HGB 7.1* 7.1* 7.0* 5.4*   HCT 21.4* 21.7* 23.1* 16.5*     --  238 57*       Recent Labs     10/20/22  1021 10/21/22  0521 10/22/22  0600    134* 139   K 3.3* 4.8 5.3*   CL 98* 102 105   CO2 25 21 23   BUN 32* 18 23*   CREATININE 2.2* 1.5* 2.0*   CALCIUM 8.5 8.2* 7.8*   PHOS 2.4*  2.4*  2.4* 1.8* 2.9       No results for input(s): AST, ALT, BILIDIR, BILITOT, ALKPHOS in the last 72 hours. No results for input(s): INR in the last 72 hours. No results for input(s): Pamla Clines in the last 72 hours. Studies:  XR CHEST PORTABLE   Final Result      1. New patchy airspace disease and/or pleural effusion right base, slight haziness in the left base which is new as well   2. Stable cardiac enlargement and dual lead central venous right IJ catheter is unchanged               VL Extremity Venous Left         CT ABDOMEN PELVIS WO CONTRAST Additional Contrast? None   Final Result   1. No evidence of intra-abdominal bleeding. 2.  Bilateral lower lobe opacities, atelectasis versus pneumonia. 3.  Small bilateral pleural effusions. XR CHEST PORTABLE   Final Result      Mild perihilar opacity with pulmonary vascular indistinctness may represent mild pulmonary edema. Bandlike opacity in the right midlung may represent atelectasis. Cardiomegaly. Assessment/Plan:  81 yo male with complex medical history who presented for hemodialysis due to lack of dialysis transport, has subsequently developed acute anemia. HENRY on CKD stage 4  ESRD: appears less likely  Pxt had 1 L UOP documented, if accurate, may be diuresising post recovery of HENRY? Alejandra Green Will discuss with Nephrology  HD per nephrology: HD T/T/S schedule  Crystalloid, to help with BP as well  Continue Midodrine  Nephrology consulted: continue monitoring for renal recovery  Social work consult      Acute anemia prob sec to GI bleed; on chronic anemia of CKD: POA  Acute anemia/GIB possibly sec to ulcer and anticioagulation  Received PRBC  CT abd/pelvis: no hemorrhage  Patient does have history of PUD earlier this summer. Family reports resolved. GI consulted.     EGD: 1) small pyloric channel ulcer (5 mm) with intact clip in place from recent EGD. 2) no active upper GI bleeding source /traces of fresh blood identified on EGD. 3) Moderate diffuse gastritis. IV--> PO PPI BID  Tolerating diet   Monitor Hgb:  Iron studies reviewed. Folate mildly decreased. B12 WNL: Replenishing Folate  Per GI: Hold AC \"for 2 weeks\"  Monitor hgb and transfuse as needed to keep > 7      P. Afib: POA  Tachy federico syndrome s/p PPM s/p PPM  - Paced rhythm on telemetry  - Eliquis 2.5 mg BID: held with GIB: Per GI: Hold AC \"for 2 weeks\"      CAD, hx of STEMI  On statin,  ASA  Per records appears is off ASA 81 mg - was stopped (received 2 doses): Attempt to clarify why off; Defer to Cardiologists       Recent MSSA bacteremia, endocarditis  Significant recent hospitalization including ICU stays, need for Pressors etc  For Septic shock with MSSA bacteremia  Infective endocarditis, AYDEE showed aortic valve vegetations. Initiated with cefepime and vancomycin on 8/24 then narrowed to cefazolin 2g q8h on 8/27. Repeat blood cultures negative. Due to hypotension was started on midodrine and BP meds were held. Required pressors in ICU for septic shock in ICU. Due to poor functional status patient was not a valvular surgical candidate; not candidate for TAVR or BAV due to AV vegetation. CT abd/pelvis and brain MRI without evidence of emboli.      -Reportedly refused device explant  Ancef 2 g 10/12;  then transitioned to Keflex per med/rec, chart review of ID recs for chronic suppression  Also on Rifabutin home dose  Will need follow-up with UC ID       Stage 3 wound on buttocks, heels: POA  Wound care consulted      Left leg edema: POA  - No DVT   - Resume OAC if ok with GI      Prob Dementia: POA  High risk for delirium w/hx of insomnia  Delirium precautions      Hypotension  - Possibly multifactorial including valvular heart disease.   - Was a major issue at recent hospital stays, had endocarditis, required dobutamine gtt/ICU stay and was placed then on midodrine TID and PRN with HD  - AM cortisol: not low  - PRN boluses crystalloids and - IV albumin to improve BP  - RYLAN Hose  - Cont midodrine  - PRN albumin per Nephro/with dialysis  - Monitor BPs           DVT Prophylaxis: on Eliquis: held with concern for bleed. SCDs: Resume if/when ok with GI.  PT/OT Eval Status: Ongoing  Diet: ADULT DIET; Regular; Low Sodium (2 gm); Low Potassium (Less than 3000 mg/day); Low Phosphorus (Less than 1000 mg)  ADULT ORAL NUTRITION SUPPLEMENT; Lunch, Dinner, Breakfast; Standard High Calorie/High Protein Oral Supplement  ADULT ORAL NUTRITION SUPPLEMENT; Lunch, Dinner; Frozen Oral Supplement  Code Status: Full Code      Disposition - Inpatient pending progress  Pxt is from Home. Recent stays in 2 OSH with life threatening disease processes. Pall Med consulted. Appreciate input. If stable/no further issues, likely DC to SNF tomorrow if set up/bed available ([facility] appears extended). I do not anticipate his BP will increase much, noah with recent hx, and he remains high risk for morbidity and mortality as well as high re-admission risk. Family ultimately want to take him home from SNF, if O/p HD (which appears has been an issue after recent Hospitalizations at OSH) is well and reliably set up and this may not be a bad idea, if he goes home with Pall/med/Hospice care. Peer to Peer done: SNF approved:  To [place] , possible DC tomorrow if nil new    No Prado MD

## 2022-10-22 NOTE — PLAN OF CARE
Problem: Skin/Tissue Integrity  Goal: Absence of new skin breakdown  Description: 1. Monitor for areas of redness and/or skin breakdown  2. Assess vascular access sites hourly  3. Every 4-6 hours minimum:  Change oxygen saturation probe site  4. Every 4-6 hours:  If on nasal continuous positive airway pressure, respiratory therapy assess nares and determine need for appliance change or resting period.   Outcome: Progressing     Problem: Safety - Adult  Goal: Free from fall injury  Outcome: Progressing     Problem: ABCDS Injury Assessment  Goal: Absence of physical injury  Outcome: Progressing     Problem: Nutrition Deficit:  Goal: Optimize nutritional status  Outcome: Progressing     Problem: Cardiovascular - Adult  Goal: Maintains optimal cardiac output and hemodynamic stability  Outcome: Progressing     Problem: Hematologic - Adult  Goal: Maintains hematologic stability  Outcome: Progressing

## 2022-10-22 NOTE — PLAN OF CARE
Problem: Discharge Planning  Goal: Discharge to home or other facility with appropriate resources  10/22/2022 0047 by Harrison Flores RN  Outcome: Zulma Motta (Taken 10/21/2022 2000)  Discharge to home or other facility with appropriate resources:   Identify barriers to discharge with patient and caregiver   Arrange for needed discharge resources and transportation as appropriate   Identify discharge learning needs (meds, wound care, etc)   Refer to discharge planning if patient needs post-hospital services based on physician order or complex needs related to functional status, cognitive ability or social support system  10/21/2022 1724 by Landy Jeffery RN  Outcome: Progressing  Flowsheets  Taken 10/21/2022 1156 by Harrison Flores RN  Discharge to home or other facility with appropriate resources:   Identify barriers to discharge with patient and caregiver   Arrange for needed discharge resources and transportation as appropriate   Identify discharge learning needs (meds, wound care, etc)   Refer to discharge planning if patient needs post-hospital services based on physician order or complex needs related to functional status, cognitive ability or social support system  Taken 10/21/2022 0810 by Landy Jeffery RN  Discharge to home or other facility with appropriate resources: Identify barriers to discharge with patient and caregiver     Problem: Pain  Goal: Verbalizes/displays adequate comfort level or baseline comfort level  10/22/2022 0047 by Harrison Flores RN  Outcome: Progressing  Flowsheets (Taken 10/21/2022 2000)  Verbalizes/displays adequate comfort level or baseline comfort level:   Encourage patient to monitor pain and request assistance   Assess pain using appropriate pain scale   Administer analgesics based on type and severity of pain and evaluate response   Implement non-pharmacological measures as appropriate and evaluate response   Notify Licensed Independent Practitioner if interventions unsuccessful or patient reports new pain  10/21/2022 1724 by Naida Schwartz RN  Outcome: Progressing  Flowsheets  Taken 10/21/2022 1724  Verbalizes/displays adequate comfort level or baseline comfort level:   Encourage patient to monitor pain and request assistance   Administer analgesics based on type and severity of pain and evaluate response   Assess pain using appropriate pain scale   Implement non-pharmacological measures as appropriate and evaluate response   Notify Licensed Independent Practitioner if interventions unsuccessful or patient reports new pain  Taken 10/21/2022 1714  Verbalizes/displays adequate comfort level or baseline comfort level: Encourage patient to monitor pain and request assistance  Taken 10/21/2022 0739  Verbalizes/displays adequate comfort level or baseline comfort level: Encourage patient to monitor pain and request assistance  Note: Discussed pharmacological and non-pharmacological interventions to assist with pain     Problem: Skin/Tissue Integrity  Goal: Absence of new skin breakdown  Description: 1. Monitor for areas of redness and/or skin breakdown  2. Assess vascular access sites hourly  3. Every 4-6 hours minimum:  Change oxygen saturation probe site  4. Every 4-6 hours:  If on nasal continuous positive airway pressure, respiratory therapy assess nares and determine need for appliance change or resting period.   10/22/2022 0047 by Deejay Giron RN  Outcome: Progressing  10/21/2022 1724 by Naida Schwartz RN  Outcome: Progressing  Note: Assisting with turning every 2 hours, elevating heels, and specialty bed in use      Problem: Safety - Adult  Goal: Free from fall injury  10/22/2022 0047 by Deejay Giron RN  Outcome: Progressing  Flowsheets (Taken 10/21/2022 2000)  Free From Fall Injury:   Instruct family/caregiver on patient safety   Based on caregiver fall risk screen, instruct family/caregiver to ask for assistance with transferring infant if caregiver noted to have fall risk factors  10/21/2022 1724 by Alona Zabala RN  Outcome: Progressing  Flowsheets  Taken 10/21/2022 1724  Free From Fall Injury: Instruct family/caregiver on patient safety  Taken 10/21/2022 0817  Free From Fall Injury: Instruct family/caregiver on patient safety  Note: Discussed falls precautions and interventions used to promote patient safety     Problem: ABCDS Injury Assessment  Goal: Absence of physical injury  10/22/2022 0047 by Earnestine Bryan RN  Outcome: Progressing  [address] (Taken 10/21/2022 2000)  Absence of Physical Injury: Implement safety measures based on patient assessment  10/21/2022 1724 by Alona Zabala RN  Outcome: Progressing  Flowsheets (Taken 10/21/2022 0817)  Absence of Physical Injury: Implement safety measures based on patient assessment     Problem: Nutrition Deficit:  Goal: Optimize nutritional status  10/22/2022 0047 by Earnestine Bryan RN  Outcome: Progressing  10/21/2022 1724 by Alona Zabala RN  Outcome: Progressing  Flowsheets (Taken 10/21/2022 1301 by Yolie Gibbs RD)  Nutrient intake appropriate for improving, restoring, or maintaining nutritional needs:   Monitor oral intake, labs, and treatment plans   Recommend appropriate diets, oral nutritional supplements, and vitamin/mineral supplements     Problem: Cardiovascular - Adult  Goal: Maintains optimal cardiac output and hemodynamic stability  10/22/2022 0047 by Earnestine Bryan RN  Outcome: Progressing  Flowsheets (Taken 10/21/2022 2000)  Maintains optimal cardiac output and hemodynamic stability:   Monitor blood pressure and heart rate   Monitor urine output and notify Licensed Independent Practitioner for values outside of normal range   Assess for signs of decreased cardiac output  10/21/2022 1724 by Alona Zabala RN  Outcome: Progressing  Goal: Absence of cardiac dysrhythmias or at baseline  10/22/2022 0047 by Earnestine Bryan RN  Outcome: Progressing  Flowsheets (Taken 10/21/2022 2000)  Absence of cardiac dysrhythmias or at baseline:   Monitor cardiac rate and rhythm   Assess for signs of decreased cardiac output  10/21/2022 1724 by Carroll Santoro RN  Outcome: Progressing     Problem: Hematologic - Adult  Goal: Maintains hematologic stability  10/22/2022 0047 by Jose Luis Luis RN  Outcome: Progressing  Flowsheets (Taken 10/21/2022 2000)  Maintains hematologic stability:   Assess for signs and symptoms of bleeding or hemorrhage   Monitor labs for bleeding or clotting disorders  10/21/2022 1724 by Carroll Santoro RN  Outcome: Progressing  Flowsheets  Taken 10/21/2022 1156 by Jose Luis Luis RN  Maintains hematologic stability:   Assess for signs and symptoms of bleeding or hemorrhage   Monitor labs for bleeding or clotting disorders  Taken 10/21/2022 0810 by Carroll Santoro RN  Maintains hematologic stability: Assess for signs and symptoms of bleeding or hemorrhage

## 2022-10-23 NOTE — PLAN OF CARE
Problem: Pain  Goal: Verbalizes/displays adequate comfort level or baseline comfort level  10/23/2022 1857 by Bo Heredia RN  Outcome: Progressing     Problem: Skin/Tissue Integrity  Goal: Absence of new skin breakdown  Description: 1. Monitor for areas of redness and/or skin breakdown  2. Assess vascular access sites hourly  3. Every 4-6 hours minimum:  Change oxygen saturation probe site  4. Every 4-6 hours:  If on nasal continuous positive airway pressure, respiratory therapy assess nares and determine need for appliance change or resting period. 10/23/2022 1857 by Bo Heredia RN  Outcome: Not Progressing     Problem: Safety - Adult  Goal: Free from fall injury  Outcome: Progressing     Problem: ABCDS Injury Assessment  Goal: Absence of physical injury  10/23/2022 1857 by Bo Heredia RN  Outcome: Progressing     Problem: Nutrition Deficit:  Goal: Optimize nutritional status  10/23/2022 1857 by Bo Heredia RN  Outcome: Progressing     Problem: Cardiovascular - Adult  Goal: Maintains optimal cardiac output and hemodynamic stability  10/23/2022 1857 by Bo Heredia RN  Outcome: Progressing     Problem: Cardiovascular - Adult  Goal: Absence of cardiac dysrhythmias or at baseline  10/23/2022 1857 by Bo Heredia RN  Outcome: Progressing     Problem: Hematologic - Adult  Goal: Maintains hematologic stability  10/23/2022 1857 by oB Heredia RN  Outcome: Progressing     Problem: Skin/Tissue Integrity  Goal: Absence of new skin breakdown  Description: 1. Monitor for areas of redness and/or skin breakdown  2. Assess vascular access sites hourly  3. Every 4-6 hours minimum:  Change oxygen saturation probe site  4. Every 4-6 hours:  If on nasal continuous positive airway pressure, respiratory therapy assess nares and determine need for appliance change or resting period.   10/23/2022 1857 by Bo Heredia RN  Outcome: Not Progressing  10/23/2022 1857 by Bo Heredia RN  Outcome: Progressing

## 2022-10-23 NOTE — PROGRESS NOTES
Hospitalist Progress Note      PCP: Judy Knox MD    Date of Admission: 10/11/2022      Chief Complaint:  ESRD needing dialysis, volume overload     82 y.o. 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 He has a chair time but it appears his transportation for dialysis was not set-up. Aside from this he has not developed any new symptoms since recent discharge from . At that admission he also need dialysis due to social work issues. He refused explantation of his PPM and has been on Ancef with planned switch to Keflex tomorrow for chronic suppression      Subjective:   Pxt is awake    Had fluid boluses for low Bps; and compression applied to LEs: BPs improved but remain soft: appears a chronic issue per discussion with family and review of records. No chest pain    Family member at bedside     No abd pain    Went for EGD 10/18. No bleeding noted; but still has dark stools  Per GI: holding eliquis : \"2 weeks\"    Had a CXR 10/21: findings likely sec to fluid boluses he had received. No S/S of pneumonia at this time, sat 100% toda. Did not get HD yesterday.  Nephro plans to dialyze patient tomorrow        Medications:  Reviewed    Infusion Medications    sodium chloride       Scheduled Medications    epoetin destiny-epbx  20,000 Units IntraVENous Once per day on Tue Thu Sat    heparin (porcine)  5,000 Units SubCUTAneous 3 times per day    acetaminophen  650 mg Oral 3 times per day    Or    acetaminophen  650 mg Rectal 3 times per day    midodrine  10 mg Oral TID WC    pantoprazole  40 mg Oral BID AC    melatonin  5 mg Oral Nightly    ferrous sulfate  325 mg Oral Daily with breakfast    Venelex   Topical BID    collagenase   Topical Daily    rifabutin  300 mg Oral Daily    sodium chloride flush  5-40 mL IntraVENous 2 times per day    cephALEXin  500 mg Oral Daily    [Held by provider] apixaban  2.5 mg Oral BID    polyethylene glycol  17 g Oral Daily    timolol  1 drop Left Eye BID    dorzolamide  1 drop Left Eye TID    traZODone  50 mg Oral Nightly    [Held by provider] aspirin  81 mg Oral Daily    atorvastatin  80 mg Oral Nightly     PRN Meds: diphenhydrAMINE, heparin flush, medicated lip ointment, albumin human, heparin (porcine), sodium chloride flush, sodium chloride, ondansetron **OR** ondansetron, polyethylene glycol      Intake/Output Summary (Last 24 hours) at 10/23/2022 0921  Last data filed at 10/23/2022 0400  Gross per 24 hour   Intake 1150 ml   Output 1070 ml   Net 80 ml         Exam:    BP (!) 93/51   Pulse 84   Temp 98 °F (36.7 °C) (Axillary)   Resp 20   Ht 5' 9\" (1.753 m)   Wt 203 lb 0.7 oz (92.1 kg)   SpO2 96%   BMI 29.98 kg/m²     General appearance: No apparent distress, appears stated age and cooperative. Neck: Supple, with full range of motion. No jugular venous distention. Respiratory:  Normal respiratory effort. Clear to auscultation, bilaterally without Rales/Wheezes/Rhonchi. Cardiovascular: Regular rate and rhythm with normal S1/S2 without murmurs, rubs or gallops. Abdomen: Soft, non-tender, non-distended with normal bowel sounds. Musculoskelatal: No clubbing, cyanosis or edema bilaterally. Skin: Skin color, texture, turgor normal.  No rashes or lesions.   Neurologic: grossly non-focal.  Psychiatric: Alert, confused (?baseline)      Labs:   Recent Labs     10/21/22  1648 10/22/22  0600 10/22/22  1151 10/23/22  0547   WBC 10.9 9.1  --  21.0*   HGB 7.0* 5.4* 7.3* 7.1*   HCT 23.1* 16.5* 23.0* 22.3*    57*  --  196       Recent Labs     10/21/22  0521 10/22/22  0600 10/23/22  0547   * 139 138   K 4.8 5.3* 3.3*    105 100   CO2 21 23 26   BUN 18 23* 13   CREATININE 1.5* 2.0* 1.5*   CALCIUM 8.2* 7.8* 8.6   PHOS 1.8* 2.9 1.7*       No results for input(s): AST, ALT, BILIDIR, BILITOT, ALKPHOS in the last 72 hours. No results for input(s): INR in the last 72 hours. No results for input(s): [place] Isabela in the last 72 hours. Studies:  XR CHEST PORTABLE   Final Result      1. New patchy airspace disease and/or pleural effusion right base, slight haziness in the left base which is new as well   2. Stable cardiac enlargement and dual lead central venous right IJ catheter is unchanged               VL Extremity Venous Left   Final Result      CT ABDOMEN PELVIS WO CONTRAST Additional Contrast? None   Final Result   1. No evidence of intra-abdominal bleeding. 2.  Bilateral lower lobe opacities, atelectasis versus pneumonia. 3.  Small bilateral pleural effusions. XR CHEST PORTABLE   Final Result      Mild perihilar opacity with pulmonary vascular indistinctness may represent mild pulmonary edema. Bandlike opacity in the right midlung may represent atelectasis. Cardiomegaly. Assessment/Plan:  83 yo male with complex medical history who presented for hemodialysis due to lack of dialysis transport, has subsequently developed acute anemia. HENRY on CKD stage 4  ESRD: appears less likely  Pxt had 1 L UOP documented, if accurate, may be diuresising post recovery of HENRY? Magalis Villegas Will discuss with Nephrology  HD per nephrology: HD T/T/S schedule  Crystalloid, to help with BP as well  Continue Midodrine  Nephrology consulted: continue monitoring for renal recovery  Social work consult      Acute anemia prob sec to GI bleed; on chronic anemia of CKD: POA  Acute anemia/GIB possibly sec to ulcer and anticioagulation  Received PRBC  CT abd/pelvis: no hemorrhage  Patient does have history of PUD earlier this summer. Family reports resolved. GI consulted. EGD: 1) small pyloric channel ulcer (5 mm) with intact clip in place from recent EGD. 2) no active upper GI bleeding source /traces of fresh blood identified on EGD.   3) Moderate diffuse gastritis. IV--> PO PPI BID  Tolerating diet   Monitor Hgb:  Iron studies reviewed. Folate mildly decreased. B12 WNL: Replenishing Folate  Per GI: Hold AC \"for 2 weeks\"  Monitor hgb and transfuse as needed to keep > 7      P. Afib: POA  Tachy federico syndrome s/p PPM s/p PPM  - Paced rhythm on telemetry  - Eliquis 2.5 mg BID: held with GIB: Per GI: Hold AC \"for 2 weeks\"      CAD, hx of STEMI  On statin,  ASA  Per records appears is off ASA 81 mg - was stopped (received 2 doses): Attempt to clarify why off; Defer to Cardiologists       Recent MSSA bacteremia, endocarditis  Significant recent hospitalization including ICU stays, need for Pressors etc  For Septic shock with MSSA bacteremia  Infective endocarditis, AYDEE showed aortic valve vegetations. Initiated with cefepime and vancomycin on 8/24 then narrowed to cefazolin 2g q8h on 8/27. Repeat blood cultures negative. Due to hypotension was started on midodrine and BP meds were held. Required pressors in ICU for septic shock in ICU. Due to poor functional status patient was not a valvular surgical candidate; not candidate for TAVR or BAV due to AV vegetation. CT abd/pelvis and brain MRI without evidence of emboli.      -Reportedly refused device explant  Ancef 2 g 10/12;  then transitioned to Keflex per med/rec, chart review of ID recs for chronic suppression  Also on Rifabutin home dose  Will need follow-up with UC ID       Stage 3 wound on buttocks, heels: POA  Wound care consulted      Left leg edema: POA  - No DVT   - Resume OAC if ok with GI      Prob Dementia: POA  High risk for delirium w/hx of insomnia  Delirium precautions      Hypotension  - Possibly multifactorial including valvular heart disease.   - Was a major issue at recent hospital stays, had endocarditis, required dobutamine gtt/ICU stay and was placed then on midodrine TID and PRN with HD  - AM cortisol: not low  - PRN boluses crystalloids and - IV albumin to improve BP  - RYLAN Hose  - Cont midodrine  - PRN albumin per Nephro/with dialysis  - Monitor BPs           DVT Prophylaxis: on Eliquis: held with concern for bleed. SCDs: Resume if/when ok with GI.  PT/OT Eval Status: Ongoing  Diet: ADULT DIET; Regular; Low Sodium (2 gm); Low Potassium (Less than 3000 mg/day); Low Phosphorus (Less than 1000 mg)  ADULT ORAL NUTRITION SUPPLEMENT; Lunch, Dinner, Breakfast; Standard High Calorie/High Protein Oral Supplement  ADULT ORAL NUTRITION SUPPLEMENT; Lunch, Dinner; Frozen Oral Supplement  Code Status: Full Code      Disposition - Inpatient pending progress  - Pxt is from Home. - Recent stays in 2 OSH with life threatening disease processes. - Pwas not dialyzed yesterday. Nephro plans to dialyze patient tomorrow  - Peer to Peer done: SNF approved:  To [facility] SNF, possible DC tomorrow after HD    Rishi Bass MD

## 2022-10-23 NOTE — PROGRESS NOTES
Nephrology Consult Note        [facility]    [email or web address]       Phone: [phone]                                                  10/23/2022 9:38 AM     Patient: Lilibeth Escalante [id number]  [id number]  Date of Admit: 10/11/2022 LOS: 12 days      Interval History and Plan:    The patient did not get hemodialysis treatment yesterday because of low blood pressure  Lab work from this morning showed potassium 3.3, bicarb 26 and BUN 13  The patient blood pressure now was 93/51  The patient has no pitting edema  The patient neuropathy for yesterday was only 20 mL? Therefore, the patient will not require dialysis for today    The patient's wife and son was at his bedside and explained to them the situation yesterday and the plan from renal aspect for today. Plan to dialyze patient tomorrow      The patient was seen and examined in his room with his wife and son at his bedside  The patient that he was not very awake, alert and converse  He appears to be comfortable though he looked pale and frail  The patient was deafly not under any acute distress  The patient most recent set of vital signs showed respiration 20, blood pressure 93/51  Lab work from this morning showed sodium 138, potassium 3.3, bicarb 26, BUN 13, creatinine was 1.5, glucose 98    WBC 21, hemoglobin 7.1. The patient's hemoglobin level yesterday was 5.4, however that must be a lab error because the repeated 1 was 7.3    I reviewed his medications and I do not see any concerning medications.       Thank you for allowing us to participate in this patient's care    In case of any question please call us at our 24 hour answering service [phone] or from 7 AM to 5 PM via [address] [place] [address] or cell number    Eliz Schaeffer MD  [facility]  [address]  [place], [address]  Fax: [phone]  Office: [phone]       Assessment & Plan     Renal function:  Acute Kidney Injury and started on Hemodialysis and discharged on 10/5 from [facility]  TTS schedule  Access: TDC. No concern for infection. 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 Lab Results   Component Value Date    CALCIUM 8.6 10/23/2022    PHOS 1.7 (L) 10/23/2022         Acid/Base status:  Stable. Volume status/BP:  Hypotension. Patient has mild edema but no SOB at rest on room air. No acute volume overload concerns. Hematology:   CKD related anemia  Goal Hgb 10-11    Lab Results   Component Value Date    IRON 25 (L) 10/15/2022    TIBC 74 (L) 10/15/2022    FERRITIN 564.1 (H) 10/15/2022     Lab Results   Component Value Date    WBC 21.0 (H) 10/23/2022    HGB 7.1 (L) 10/23/2022    HCT 22.3 (L) 10/23/2022    MCV 88.1 10/23/2022     10/23/2022             Reason for Consult and Chief Complaint     Reason for consult: HENRY on HD    Chief complaint:   Chief Complaint   Patient presents with    Procedure     Via EMS from home, with c/o needing dialysis, states he goes on t, th, sat. States his first dialysis treatment was this past Saturday at [facility]. States he was not able to go there today to receive treatment. Pt denies cp, sob, fevers, chills. History of Present Illness   Roxi Stewart is a 82 y.o. 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 Patient is accompanied by her wife. Patient is comfortable on room air and denied SOB, chest pain, fever or cough.        Review of Systems   Positive in bold or unable to assess     GEN: Fever, chills, night sweats. HEENT: Changes in vision, sore throat, rhinorrhea   CVS: Chest pain, palpitations, swelling or edema in legs  Pulmonary: Cough, hemoptysis, SOB  GI: Nausea, vomiting, diarrhea, constipation, abdominal pain  : Bladder incontinence, dysuria,hematuria. MSK: Muscle or joint or bone pains  Skin: Rashes, ulcers, skin thickness  CNS: Headache, dizziness, confusion, focal weakness, seizure. Psych: Anxiety, agitation, depression. Reviewed all 12 systems, negative except as above. Past Medical History        Afib (CMS Dx)   on Eliquis    Aortic stenosis    Chronic kidney disease (CKD)    ESRD (end stage renal disease) on dialysis (CMS Dx) 2022    HFrEF (heart failure with reduced ejection fraction) (CMS Dx)   EF 40%    HLD (hyperlipidemia)    Hypertension    STEMI (ST elevation myocardial infarction) (CMS Dx) 06/2022    Tachy-federico syndrome (CMS Dx)   s/p pacemaker 7/2022       Past Surgical History     Past Surgical History:   Procedure Laterality Date    UPPER GASTROINTESTINAL ENDOSCOPY N/A 10/17/2022    EGD BIOPSY performed by Tung Barron MD at [place] [address] History     History reviewed. No pertinent family history. Social History     Social History     Tobacco Use    Smoking status: Never    Smokeless tobacco: Never   Substance Use Topics    Alcohol use: Not Currently          Past medical, family, and social histories were reviewed as previously documented. Updates were made as necessary.       Inpatient Medications and Allergies       Scheduled Meds:    Allergies: No Known Allergies      Vital Signs     Vitals:    10/23/22 0851   BP: (!) 93/51   Pulse: 84   Resp: 20   Temp:    SpO2: 96%         Intake/Output Summary (Last 24 hours) at 10/23/2022 9051  Last data filed at 10/23/2022 0400  Gross per 24 hour   Intake 1150 ml   Output 1070 ml   Net 80 ml           Physical Exam     General appearance: NAD  Head: Normocephalic, without obvious abnormality, atraumatic   Mouth: Moist mucous membranes. Neck: Supple. Lungs: Good air entry bilaterally. No respiratory distress on 1 liter oxygen  Heart: S1, S2.  Abdomen: soft, non-tender non-distended  Extremities: Mild leg edema  Skin: No concerning rashes noted  Psych: good eye contact, normal affect  Neuro: AAO x 3  TDC in place.        Laboratory Data     Please see above     Diagnostic Studies   Pertinent images reviewed

## 2022-10-23 NOTE — PLAN OF CARE
Problem: Pain  Goal: Verbalizes/displays adequate comfort level or baseline comfort level  Outcome: Progressing  Flowsheets (Taken 10/23/2022 0341)  Verbalizes/displays adequate comfort level or baseline comfort level:   Encourage patient to monitor pain and request assistance   Assess pain using appropriate pain scale   Administer analgesics based on type and severity of pain and evaluate response   Implement non-pharmacological measures as appropriate and evaluate response     Problem: Skin/Tissue Integrity  Goal: Absence of new skin breakdown  Description: 1. Monitor for areas of redness and/or skin breakdown  2. Assess vascular access sites hourly  3. Every 4-6 hours minimum:  Change oxygen saturation probe site  4. Every 4-6 hours:  If on nasal continuous positive airway pressure, respiratory therapy assess nares and determine need for appliance change or resting period.   10/22/2022 1856 by Bo Heredia RN  Outcome: Progressing     Problem: Safety - Adult  Goal: Free from fall injury  10/23/2022 0342 by Baldo Ruelas RN  Outcome: Progressing  Note: Q2 hrly turning , no new skin issues noted      Problem: Cardiovascular - Adult  Goal: Maintains optimal cardiac output and hemodynamic stability  10/23/2022 0342 by Baldo Ruelas RN  Outcome: Progressing  Flowsheets (Taken 10/23/2022 0342)  Maintains optimal cardiac output and hemodynamic stability:   Monitor blood pressure and heart rate   Monitor urine output and notify Licensed Independent Practitioner for values outside of normal range   Assess for signs of decreased cardiac output

## 2022-10-24 NOTE — PROGRESS NOTES
Hospitalist Progress Note      PCP: Uvaldo Pride MD    Date of Admission: 10/11/2022      Chief Complaint:  ESRD needing dialysis, volume overload     82 y.o. 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 He has a chair time but it appears his transportation for dialysis was not set-up. Aside from this he has not developed any new symptoms since recent discharge from . At that admission he also need dialysis due to social work issues. He refused explantation of his PPM and has been on Ancef with planned switch to Keflex tomorrow for chronic suppression      Subjective:   Pxt is awake    Had fluid boluses for low Bps; and compression applied to LEs: BPs improved but remain soft: appears a chronic issue per discussion with family and review of records. No chest pain    Went for EGD 10/18. No bleeding noted; but still has dark stools  Per GI: holding eliquis : \"2 weeks\"    Had a CXR 10/21: findings likely sec to fluid boluses he had received. No S/S of pneumonia at this time, sat 100%     He is sitting OOB today    Family member at bedside     No abd pain    Did not get HD satursday.  Nephro plans to dialyze patient tomorrow: TTS schedule    Not eating much: discussed with nephro: we liberalized diet to hopefully increase PO intake        Medications:  Reviewed    Infusion Medications    sodium chloride       Scheduled Medications    mirtazapine  15 mg Oral Nightly    phosphorus  250 mg Oral TID    epoetin destiny-epbx  20,000 Units IntraVENous Once per day on Tue Thu Sat    heparin (porcine)  5,000 Units SubCUTAneous 3 times per day    acetaminophen  650 mg Oral 3 times per day    Or    acetaminophen  650 mg Rectal 3 times per day    midodrine  10 mg Oral TID WC    pantoprazole  40 mg Oral BID AC melatonin  5 mg Oral Nightly    ferrous sulfate  325 mg Oral Daily with breakfast    Venelex   Topical BID    collagenase   Topical Daily    rifabutin  300 mg Oral Daily    sodium chloride flush  5-40 mL IntraVENous 2 times per day    cephALEXin  500 mg Oral Daily    [Held by provider] apixaban  2.5 mg Oral BID    polyethylene glycol  17 g Oral Daily    timolol  1 drop Left Eye BID    dorzolamide  1 drop Left Eye TID    traZODone  50 mg Oral Nightly    [Held by provider] aspirin  81 mg Oral Daily    atorvastatin  80 mg Oral Nightly     PRN Meds: diphenhydrAMINE, heparin flush, medicated lip ointment, albumin human, heparin (porcine), sodium chloride flush, sodium chloride, ondansetron **OR** ondansetron, polyethylene glycol      Intake/Output Summary (Last 24 hours) at 10/24/2022 1352  Last data filed at 10/24/2022 0559  Gross per 24 hour   Intake 80 ml   Output 0 ml   Net 80 ml         Exam:    BP (!) 92/50   Pulse 91   Temp 98 °F (36.7 °C) (Axillary)   Resp 18   Ht 5' 9\" (1.753 m)   Wt 203 lb 0.7 oz (92.1 kg)   SpO2 96%   BMI 29.98 kg/m²     General appearance: No apparent distress, appears stated age and cooperative. Neck: Supple, with full range of motion. No jugular venous distention. Respiratory:  Normal respiratory effort. Clear to auscultation, bilaterally without Rales/Wheezes/Rhonchi. Cardiovascular: Regular rate and rhythm with normal S1/S2 without murmurs, rubs or gallops. Abdomen: Soft, non-tender, non-distended with normal bowel sounds. Musculoskelatal: No clubbing, cyanosis or edema bilaterally. Skin: Skin color, texture, turgor normal.  No rashes or lesions.   Neurologic: grossly non-focal.  Psychiatric: Alert, confused (?baseline)      Labs:   Recent Labs     10/23/22  0547 10/23/22  1216 10/24/22  0534   WBC 21.0* 23.2* 20.9*   HGB 7.1* 7.6* 7.3*   HCT 22.3* 24.2* 22.9*    208 194       Recent Labs     10/22/22  0600 10/23/22  0547 10/24/22  0534    138 136   K 5.3* 3.3* 3.3*    100 100   CO2 23 26 24   BUN 23* 13 20   CREATININE 2.0* 1.5* 2.1*   CALCIUM 7.8* 8.6 9.0   PHOS 2.9 1.7* 2.0*       No results for input(s): AST, ALT, BILIDIR, BILITOT, ALKPHOS in the last 72 hours. No results for input(s): INR in the last 72 hours. No results for input(s): Hamp Memory in the last 72 hours. Studies:  XR CHEST PORTABLE   Final Result      1. New patchy airspace disease and/or pleural effusion right base, slight haziness in the left base which is new as well   2. Stable cardiac enlargement and dual lead central venous right IJ catheter is unchanged               VL Extremity Venous Left   Final Result      CT ABDOMEN PELVIS WO CONTRAST Additional Contrast? None   Final Result   1. No evidence of intra-abdominal bleeding. 2.  Bilateral lower lobe opacities, atelectasis versus pneumonia. 3.  Small bilateral pleural effusions. XR CHEST PORTABLE   Final Result      Mild perihilar opacity with pulmonary vascular indistinctness may represent mild pulmonary edema. Bandlike opacity in the right midlung may represent atelectasis. Cardiomegaly. Assessment/Plan:  83 yo male with complex medical history who presented for hemodialysis due to lack of dialysis transport, has subsequently developed acute anemia. HENRY on CKD stage 4  ESRD: appears less likely  Pxt had 1 L UOP documented, if accurate, may be diuresising post recovery of HENRY? Jean-Paul Hopkins Will discuss with Nephrology  HD per nephrology: HD T/T/S schedule  Crystalloid, to help with BP as well  Continue Midodrine  Nephrology consulted: continue monitoring for renal recovery  Social work consult      Acute anemia prob sec to GI bleed; on chronic anemia of CKD: POA  Acute anemia/GIB possibly sec to ulcer and anticioagulation  Received PRBC  CT abd/pelvis: no hemorrhage  Patient does have history of PUD earlier this summer. Family reports resolved. GI consulted.     EGD: 1) small pyloric channel ulcer (5 mm) with intact clip in place from recent EGD. 2) no active upper GI bleeding source /traces of fresh blood identified on EGD. 3) Moderate diffuse gastritis. IV--> PO PPI BID  Tolerating diet   Monitor Hgb:  Iron studies reviewed. Folate mildly decreased. B12 WNL: Replenishing Folate  Per GI: Hold AC \"for 2 weeks\"  Monitor hgb and transfuse as needed to keep > 7      P. Afib: POA  Tachy federico syndrome s/p PPM s/p PPM  - Paced rhythm on telemetry  - Eliquis 2.5 mg BID: held with GIB: Per GI: Hold AC \"for 2 weeks\"      CAD, hx of STEMI  On statin,  ASA  Per records appears is off ASA 81 mg - was stopped (received 2 doses): Attempt to clarify why off; Defer to Cardiologists       Recent MSSA bacteremia, endocarditis  Significant recent hospitalization including ICU stays, need for Pressors etc  For Septic shock with MSSA bacteremia  Infective endocarditis, AYDEE showed aortic valve vegetations. Initiated with cefepime and vancomycin on 8/24 then narrowed to cefazolin 2g q8h on 8/27. Repeat blood cultures negative. Due to hypotension was started on midodrine and BP meds were held. Required pressors in ICU for septic shock in ICU. Due to poor functional status patient was not a valvular surgical candidate; not candidate for TAVR or BAV due to AV vegetation. CT abd/pelvis and brain MRI without evidence of emboli. -Reportedly refused device explant  Ancef 2 g 10/12;  then transitioned to Keflex per med/rec, chart review of ID recs for chronic suppression  Also on Rifabutin home dose  Will need follow-up with UC ID       Stage 3 wound on buttocks, heels: POA  Wound care consulted      Left leg edema: POA  - No DVT   - Resume OAC if ok with GI      Possible Dementia: POA  High risk for delirium w/hx of insomnia  - Appears really has not been noted with dementia type symptoms, and reportedly was very functional, works out in basement, lifting weights, up until recent acute illness past 2 moths or so.    - Encourage Po intake: diet liberalized and remeron added to increase oral intake      Hypotension  - Possibly multifactorial including valvular heart disease.   - Was a major issue at recent hospital stays, had endocarditis, required dobutamine gtt/ICU stay and was placed then on midodrine TID and PRN with HD  - AM cortisol: not low  - PRN boluses crystalloids and - IV albumin to improve BP  - RYLAN Hose  - Cont midodrine  - PRN albumin per Nephro/with dialysis  - Monitor BPs           DVT Prophylaxis: on Eliquis: held with concern for bleed. SCDs: Resume if/when ok with GI.  PT/OT Eval Status: Ongoing  Diet: ADULT ORAL NUTRITION SUPPLEMENT; Lunch, Dinner, Breakfast; Standard High Calorie/High Protein Oral Supplement  ADULT ORAL NUTRITION SUPPLEMENT; Lunch, Dinner; Frozen Oral Supplement  ADULT DIET; Regular  Code Status: Full Code      Disposition - Inpatient pending progress  - Pxt is from Home. - Recent stays in 2 OSH with life threatening disease processes. - Pwas not dialyzed saturday. Nephro plans to dialyze patient tomorrow  - Peer to Peer done: SNF approved:  To [facility], possible DC tomorrow after HD    Charity Espinoza MD

## 2022-10-24 NOTE — CARE COORDINATION
CM spoke with patient's wife at bedside. They are agreeable to [facility], in hopes for short rehab enough to get him back home where they can provide his care. CM spoke left voicemail for Jacob Martin that pt should discharge tomorrow. Patient's insurance approved through 10/25. Will need a hens, transport and run sheets.       [facility]:    [id number]  [id number]  SNF  10/20/2022  10/21/2022-10/25/2022  Approved      Shae Fitzpatrick RN, BSN,   4th Floor Progressive Care Unit  [phone]

## 2022-10-24 NOTE — PLAN OF CARE
Problem: Pain  Goal: Verbalizes/displays adequate comfort level or baseline comfort level  Outcome: Progressing     Problem: Skin/Tissue Integrity  Goal: Absence of new skin breakdown  Description: 1. Monitor for areas of redness and/or skin breakdown  2. Assess vascular access sites hourly  3. Every 4-6 hours minimum:  Change oxygen saturation probe site  4. Every 4-6 hours:  If on nasal continuous positive airway pressure, respiratory therapy assess nares and determine need for appliance change or resting period. Outcome: Not Progressing     Problem: Safety - Adult  Goal: Free from fall injury  Outcome: Progressing     Problem: ABCDS Injury Assessment  Goal: Absence of physical injury  Outcome: Progressing     Problem: Nutrition Deficit:  Goal: Optimize nutritional status  Outcome: Progressing     Problem: Cardiovascular - Adult  Goal: Maintains optimal cardiac output and hemodynamic stability  Outcome: Progressing     Problem: Cardiovascular - Adult  Goal: Absence of cardiac dysrhythmias or at baseline  Outcome: Progressing     Problem: Hematologic - Adult  Goal: Maintains hematologic stability  Outcome: Progressing     Problem: Skin/Tissue Integrity  Goal: Absence of new skin breakdown  Description: 1. Monitor for areas of redness and/or skin breakdown  2. Assess vascular access sites hourly  3. Every 4-6 hours minimum:  Change oxygen saturation probe site  4. Every 4-6 hours:  If on nasal continuous positive airway pressure, respiratory therapy assess nares and determine need for appliance change or resting period.   Outcome: Not Progressing

## 2022-10-24 NOTE — PLAN OF CARE
Problem: Pain  Goal: Verbalizes/displays adequate comfort level or baseline comfort level  10/24/2022 0216 by Ahmet Ayala RN  Outcome: Progressing  Flowsheets (Taken 10/24/2022 0216)  Verbalizes/displays adequate comfort level or baseline comfort level:   Assess pain using appropriate pain scale   Administer analgesics based on type and severity of pain and evaluate response   Encourage patient to monitor pain and request assistance     Problem: Skin/Tissue Integrity  Goal: Absence of new skin breakdown  Description: 1. Monitor for areas of redness and/or skin breakdown  2. Assess vascular access sites hourly  3. Every 4-6 hours minimum:  Change oxygen saturation probe site  4. Every 4-6 hours:  If on nasal continuous positive airway pressure, respiratory therapy assess nares and determine need for appliance change or resting period. 10/24/2022 0216 by Ahmet Ayala RN  Outcome: Progressing  Note: Every 2 hourly turning , change dressing on the sacral area as needed , keep the buttocks free of stool and urine     Problem: Safety - Adult  Goal: Free from fall injury  10/24/2022 0216 by Ahmet Ayala RN  Outcome: Progressing  Flowsheets (Taken 10/24/2022 0216)  Free From Fall Injury: Instruct family/caregiver on patient safety  Note: Fall protocol in place, call bell within reach , bed at the lowest position      Problem: Skin/Tissue Integrity  Goal: Absence of new skin breakdown  Description: 1. Monitor for areas of redness and/or skin breakdown  2. Assess vascular access sites hourly  3. Every 4-6 hours minimum:  Change oxygen saturation probe site  4. Every 4-6 hours:  If on nasal continuous positive airway pressure, respiratory therapy assess nares and determine need for appliance change or resting period.   10/24/2022 0216 by Ahmet Ayala RN  Outcome: Progressing  Note: Every 2 hourly turning , change dressing on the sacral area as needed , keep the buttocks free of stool and urine  10/23/2022 1857 by Bria Sanchez, RN  Outcome: Not Progressing  10/23/2022 1857 by Bria Sanchez, RN  Outcome: Progressing

## 2022-10-24 NOTE — PROGRESS NOTES
Nephrology Consult Note        [facility]    [email or web address]       Phone: [phone]                                                  10/24/2022 8:37 AM     Patient: Hong Carlisle [id number]  [id number]  Date of Admit: 10/11/2022 LOS: 13 days      Interval History and Plan:  Patient seen at bedside  Patient comfortable but lethargic  K 3.3  BUN low as patient is not eating much  No urine output charted  Patient on antibiotics for pneumonia  BP remain soft  Per patient's wife patient has poor appetite and made only a little urine        Dialysis tomorrow per TTS schedule. Monitor BP closely, Continue Midodrine  Epogen during dialysis. S/P IV Iron  Monitor Hb and transfuse as needed  Replete phosphorus as needed. Regular diet. Will remove all restrictions and consider adding Mirtazapine for appetite stimulation  Improve nutrition. Avoid nephrotoxins  Please have strict I/O  Monitor labs and vitals closely  Renally dose all medications  Patient/family interested in home dialysis. Will arrange for education about home dialysis as outpatient. D/W patient/family      Thank you for allowing us to participate in this patient's care    In case of any question please call us at our 24 hour answering service [phone] or from 7 AM to 5 PM via [employer] or cell number    Jaxon Aragon MD  [facility]  [address] [facility] [address], [address]  Fax: [phone]  Office: [phone]       Assessment & Plan     Renal function:  Acute Kidney Injury and started on Hemodialysis and discharged on 10/5 from [facility] schedule  Access: [facility]. No concern for infection.    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 Lab Results   Component Value Date    CALCIUM 9.0 10/24/2022    PHOS 2.0 (L) 10/24/2022         Acid/Base status:  Stable. Volume status/BP:  Hypotension. Patient has mild edema but no SOB at rest on room air. No acute volume overload concerns. Hematology:   CKD related anemia  Goal Hgb 10-11    Lab Results   Component Value Date    IRON 25 (L) 10/15/2022    TIBC 74 (L) 10/15/2022    FERRITIN 564.1 (H) 10/15/2022     Lab Results   Component Value Date    WBC 20.9 (H) 10/24/2022    HGB 7.3 (L) 10/24/2022    HCT 22.9 (L) 10/24/2022    MCV 88.1 10/24/2022     10/24/2022             Reason for Consult and Chief Complaint     Reason for consult: HERNY on HD    Chief complaint:   Chief Complaint   Patient presents with    Procedure     Via EMS from home, with c/o needing dialysis, states he goes on t, th, sat. States his first dialysis treatment was this past Saturday at [facility]. States he was not able to go there today to receive treatment. Pt denies cp, sob, fevers, chills. History of Present Illness   Monica Lopez is a 82 y.o. with PMH significant for HFrEF (40%), STEMI on 6/2022, s/p pacemaker 7/2022 2/2 Tachybrady syndrome, Afib on Eliquis, Aortic Stenosis, HLD, HTN, HENRY on Hemodialysis was discharged from [facility] on 10/5 but due to transportation issues patient was not able to go to outpatient dialysis clinic and he was brought to ER by family for dialysis. Patient is accompanied by her wife. Patient is comfortable on room air and denied SOB, chest pain, fever or cough. Review of Systems   Positive in bold or unable to assess     GEN: Fever, chills, night sweats.   HEENT: Changes in vision, sore throat, rhinorrhea   CVS: Chest pain, palpitations, swelling or edema in legs  Pulmonary: Cough, hemoptysis, SOB  GI: Nausea, vomiting, diarrhea, constipation, abdominal pain  : Bladder incontinence, dysuria,hematuria. MSK: Muscle or joint or bone pains  Skin: Rashes, ulcers, skin thickness  CNS: Headache, dizziness, confusion, focal weakness, seizure. Psych: Anxiety, agitation, depression. Reviewed all 12 systems, negative except as above. Past Medical History        Afib (CMS Dx)   on Eliquis    Aortic stenosis    Chronic kidney disease (CKD)    ESRD (end stage renal disease) on dialysis (CMS Dx) 2022    HFrEF (heart failure with reduced ejection fraction) (CMS Dx)   EF 40%    HLD (hyperlipidemia)    Hypertension    STEMI (ST elevation myocardial infarction) (CMS Dx) 06/2022    Tachy-federico syndrome (CMS Dx)   s/p pacemaker 7/2022       Past Surgical History     Past Surgical History:   Procedure Laterality Date    UPPER GASTROINTESTINAL ENDOSCOPY N/A 10/17/2022    EGD BIOPSY performed by Marlen Gauthier MD at [address] History     History reviewed. No pertinent family history. Social History     Social History     Tobacco Use    Smoking status: Never    Smokeless tobacco: Never   Substance Use Topics    Alcohol use: Not Currently          Past medical, family, and social histories were reviewed as previously documented. Updates were made as necessary. Inpatient Medications and Allergies       Scheduled Meds:    Allergies: No Known Allergies      Vital Signs     Vitals:    10/24/22 0307   BP: 130/89   Pulse: 97   Resp: 17   Temp: 98.2 °F (36.8 °C)   SpO2: 97%         Intake/Output Summary (Last 24 hours) at 10/24/2022 0837  Last data filed at 10/24/2022 0559  Gross per 24 hour   Intake 80 ml   Output 0 ml   Net 80 ml           Physical Exam     General appearance: NAD  Head: Normocephalic, without obvious abnormality, atraumatic   Mouth: Moist mucous membranes. Neck: Supple. Lungs: Good air entry bilaterally.  No respiratory distress on 1 liter oxygen  Heart: S1, S2.  Abdomen: soft, non-tender non-distended  Extremities: Mild leg edema  Skin: No concerning rashes noted  Psych: good eye contact, normal affect  Neuro: AAO x 3  TDC in place.        Laboratory Data     Please see above     Diagnostic Studies   Pertinent images reviewed

## 2022-10-25 NOTE — CARE COORDINATION
Case Management Assessment            Discharge Note                    Date / Time of Note: 10/25/2022 3:40 PM                  Discharge Note Completed by: Alyce Burciaga RN    Patient Name: Taisha Dietrich   YOB: 1935  Diagnosis: ESRD needing dialysis [facility]) [N18.6, Z99.2]   Date / Time: 10/11/2022 10:56 AM    Current PCP: Maru [place] MD  Clinic patient: No    Hospitalization in the last 30 days: No    Advance Directives:  Code Status: Full Code  [place] DNR form completed and on chart: Not Indicated    Financial:  Payor: Jennifer Weiss / Plan: Devyn Donis / Product Type: *No Product type* /      Pharmacy:    [address] [place] [address]  Phone: [phone] Fax: [phone]      Assistance purchasing medications?: Potential Assistance Purchasing Medications: No  Assistance provided by Case Management: None at this time    Does patient want to participate in local refill/ meds to beds program?: No    Meds To Beds General Rules:  1. Can ONLY be done Monday- Friday between 8:30am-5pm  2. Prescription(s) must be in pharmacy by 3pm to be filled same day  3. Copy of patient's insurance/ prescription drug card and patient face sheet must be sent along with the prescription(s)  4. Cost of Rx cannot be added to hospital bill. If financial assistance is needed, please contact unit  or ;  or  CANNOT provide pharmacy voucher for patients co-pays  5.  Patients can then  the prescription on their way out of the hospital at discharge, or pharmacy can deliver to the bedside if staff is available. (payment due at time of pick-up or delivery - cash, check, or card accepted)     Able to afford home medications/ co-pay costs: No    ADLS:  Current PT AM-PAC Score: 6 /24  Current OT AM-PAC Score: 12 /24      DISCHARGE Disposition: [facility] (SNF): [place] [address].  Phone: [phone] Fax: [phone]    LOC at discharge: Skilled  Luisa Nino Completed: Yes    Notification completed in HENS/PAS?:  At [place] [address]. Document ID : [id number]    IMM Completed:   No    Transportation:  Transportation PLAN for discharge: EMS transportation   Mode of Transport: Ambulance stretcher - BLS  Reason for medical transport: Other: bed bound, ESRD  Name of [address] [place] [address], [address]: [facility]  Phone: [phone]  Time of Transport: 9:30pm    Transport form completed: Yes      Additional CM Notes: The Plan for Transition of Care is related to the following treatment goals of ESRD needing dialysis ([place] [address].) [N18.6, Z99.2]    The Patient and/or patient representative Saba Lezama and his family were provided with a choice of provider and agrees with the discharge plan Yes    Freedom of choice list was provided with basic dialogue that supports the patient's individualized plan of care/goals and shares the quality data associated with the providers.  Yes    Care Transitions patient: No    Lennox Piper, RN  [facility]  Case Management Department  Ph: [phone]  Fax: [phone]

## 2022-10-25 NOTE — PROGRESS NOTES
Nephrology Consult Note        [facility]    [facility]       Phone: [phone]                                                  10/25/2022 7:47 AM     Patient: Carter Viera [id number]  [id number]  Date of Admit: 10/11/2022 LOS: 14 days      Interval History and Plan:  Patient seen during dialysis  Patient comfortable  Denied SOB  MAP around 60  K 3.5  Not much urine output  No signs of kidney function recovery      Dialysis today per TTS schedule. Keep even fluid balance   Monitor BP closely, Continue Midodrine  Epogen during dialysis. S/P IV Iron  Monitor Hb and transfuse as needed  Replete phosphorus as needed. Regular diet. Removed all restrictions and Mirtazapine started for appetite stimulation  Avoid nephrotoxins  Please have strict I/O  Monitor labs and vitals closely  Renally dose all medications      D/W patient and dialysis nurse      Thank you for allowing us to participate in this patient's care    In case of any question please call us at our 24 hour answering service [phone] or from 7 AM to 5 PM via [employer] or cell number    Shannan Lemon MD  [facility]  [address] [address], [address]  Fax: [phone]  Office: [phone]       Assessment & Plan     Renal function:  Acute Kidney Injury and started on Hemodialysis and discharged on 10/5 from [facility] schedule  Access: [facility] [place]. No concern for infection. 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 Lab Results   Component Value Date    CALCIUM 8.8 10/25/2022    PHOS 2.5 10/25/2022         Acid/Base status:  Stable.      Volume status/BP:  Hypotension. Patient has mild edema but no SOB at rest on room air. No acute volume overload concerns. Hematology:   CKD related anemia  Goal Hgb 10-11    Lab Results   Component Value Date    IRON 25 (L) 10/15/2022    TIBC 74 (L) 10/15/2022    FERRITIN 564.1 (H) 10/15/2022     Lab Results   Component Value Date    WBC 23.4 (H) 10/25/2022    HGB 7.5 (L) 10/25/2022    HCT 24.4 (L) 10/25/2022    MCV 90.0 10/25/2022     10/25/2022             Reason for Consult and Chief Complaint     Reason for consult: HENRY on HD    Chief complaint:   Chief Complaint   Patient presents with    Procedure     Via EMS from home, with c/o needing dialysis, states he goes on t, th, sat. States his first dialysis treatment was this past Saturday at [facility]. States he was not able to go there today to receive treatment. Pt denies cp, sob, fevers, chills. History of Present Illness   Cliff Galvan is a 82 y.o. 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 Patient is accompanied by her wife. Patient is comfortable on room air and denied SOB, chest pain, fever or cough. Review of Systems   Positive in bold or unable to assess     GEN: Fever, chills, night sweats. HEENT: Changes in vision, sore throat, rhinorrhea   CVS: Chest pain, palpitations, swelling or edema in legs  Pulmonary: Cough, hemoptysis, SOB  GI: Nausea, vomiting, diarrhea, constipation, abdominal pain  : Bladder incontinence, dysuria,hematuria. MSK: Muscle or joint or bone pains  Skin: Rashes, ulcers, skin thickness  CNS: Headache, dizziness, confusion, focal weakness, seizure. Psych: Anxiety, agitation, depression.   Reviewed all 12 systems, negative except as above. Past Medical History        Afib (CMS Dx)   on Eliquis    Aortic stenosis    Chronic kidney disease (CKD)    ESRD (end stage renal disease) on dialysis (CMS Dx) 2022    HFrEF (heart failure with reduced ejection fraction) (CMS Dx)   EF 40%    HLD (hyperlipidemia)    Hypertension    STEMI (ST elevation myocardial infarction) (CMS Dx) 06/2022    Tachy-federico syndrome (CMS Dx)   s/p pacemaker 7/2022       Past Surgical History     Past Surgical History:   Procedure Laterality Date    UPPER GASTROINTESTINAL ENDOSCOPY N/A 10/17/2022    EGD BIOPSY performed by Renny Mike MD at [address] History     History reviewed. No pertinent family history. Social History     Social History     Tobacco Use    Smoking status: Never    Smokeless tobacco: Never   Substance Use Topics    Alcohol use: Not Currently          Past medical, family, and social histories were reviewed as previously documented. Updates were made as necessary. Inpatient Medications and Allergies       Scheduled Meds:    Allergies: No Known Allergies      Vital Signs     Vitals:    10/25/22 0412   BP: (!) 97/55   Pulse: 89   Resp: 17   Temp: 97.8 °F (36.6 °C)   SpO2: 95%         Intake/Output Summary (Last 24 hours) at 10/25/2022 0747  Last data filed at 10/25/2022 0559  Gross per 24 hour   Intake 510 ml   Output 20 ml   Net 490 ml           Physical Exam     General appearance: NAD  Head: Normocephalic, without obvious abnormality, atraumatic   Mouth: Moist mucous membranes. Neck: Supple. Lungs: Good air entry bilaterally. No respiratory distress on oxygen  Heart: S1, S2.  Abdomen: soft, non-tender non-distended  Extremities: Mild leg edema  Skin: No concerning rashes noted  Psych: good eye contact, normal affect  Neuro: AAO x 3  TDC in place.        Laboratory Data     Please see above     Diagnostic Studies   Pertinent images reviewed

## 2022-10-25 NOTE — PLAN OF CARE
Problem: Skin/Tissue Integrity  Goal: Absence of new skin breakdown  Description: 1. Monitor for areas of redness and/or skin breakdown  2. Assess vascular access sites hourly  3. Every 4-6 hours minimum:  Change oxygen saturation probe site  4. Every 4-6 hours:  If on nasal continuous positive airway pressure, respiratory therapy assess nares and determine need for appliance change or resting period. 10/25/2022 0553 by Cole Flores RN  Outcome: Progressing  Note: Dressing changed, wound cleansed with the cream recommended by the wound nurse and flushed with saline , every 2 hrly turning observed , no new skin issues noted      Problem: Safety - Adult  Goal: Free from fall injury  10/25/2022 0553 by Cole Flores RN  Outcome: Progressing  Flowsheets (Taken 10/25/2022 0553)  Free From Fall Injury: Instruct family/caregiver on patient safety  Note: Fall protocol in place, bed at the lowest position , reinforced pt about the fall prevention      Problem: Cardiovascular - Adult  Goal: Maintains optimal cardiac output and hemodynamic stability  10/25/2022 0553 by Cole Flores RN  Outcome: Progressing  Flowsheets (Taken 10/25/2022 0553)  Maintains optimal cardiac output and hemodynamic stability:   Monitor blood pressure and heart rate   Assess for signs of decreased cardiac output  Note: Monitor pt's vitals , uneventful overnight . Problem: Skin/Tissue Integrity  Goal: Absence of new skin breakdown  Description: 1. Monitor for areas of redness and/or skin breakdown  2. Assess vascular access sites hourly  3. Every 4-6 hours minimum:  Change oxygen saturation probe site  4. Every 4-6 hours:  If on nasal continuous positive airway pressure, respiratory therapy assess nares and determine need for appliance change or resting period.   10/25/2022 0553 by Cole Flores RN  Outcome: Progressing  Note: Dressing changed, wound cleansed with the cream recommended by the wound nurse and flushed with saline , every 2 hrly turning observed , no new skin issues noted   10/24/2022 1927 by Darshana Gonzalez, RN  Outcome: Not Progressing

## 2022-10-25 NOTE — PROGRESS NOTES
Physical Therapy/Occupational Therapy Attempt    Pt is currently off the floor for dialysis. Will f/u at a later date if schedule permits.      Ailyn Noyola , PT, DPT #[id number]  Eamon Singleton, OTR/L

## 2022-10-25 NOTE — DISCHARGE SUMMARY
Hospital Discharge Summary    Patient's PCP: Cuong Sinclair MD  Admit Date: 10/11/2022   Discharge Date: 10/25/2022    Admitting Physician: Chantell López DO  Discharge Physician: Lisa Mixon MD   Consults: GI, nephrology, and palliative care        Discharge Diagnoses: HENRY on CKD stage 4  ESRD: appears less likely  Pxt had 1 L UOP documented, if accurate, may be diuresising post recovery of HENRY? Elvialeann Hernandez Will discuss with Nephrology  HD per nephrology: HD T/T/S schedule  Crystalloid, to help with BP as well  Continue Midodrine  Nephrology consulted: continue monitoring for renal recovery  Social work consult        Acute anemia prob sec to GI bleed; on chronic anemia of CKD: POA  Acute anemia/GIB possibly sec to ulcer and anticioagulation  Received PRBC  CT abd/pelvis: no hemorrhage  Patient does have history of PUD earlier this summer. Family reports resolved. GI consulted. EGD: 1) small pyloric channel ulcer (5 mm) with intact clip in place from recent EGD. 2) no active upper GI bleeding source /traces of fresh blood identified on EGD. 3) Moderate diffuse gastritis. IV--> PO PPI BID  Tolerating diet   Monitor Hgb:  Iron studies reviewed. Folate mildly decreased. B12 WNL: Replenishing Folate  Per GI: Hold AC \"for 2 weeks\"  Monitor hgb and transfuse as needed to keep > 7        P. Afib: POA  Tachy federico syndrome s/p PPM s/p PPM  - Paced rhythm on telemetry  - Eliquis 2.5 mg BID: held with GIB: Per GI: Hold AC \"for 2 weeks\"        CAD, hx of STEMI  On statin,  ASA  Per records appears is off ASA 81 mg - was stopped (received 2 doses): Attempt to clarify why off; Defer to Cardiologists        Recent MSSA bacteremia, endocarditis  Significant recent hospitalization including ICU stays, need for Pressors etc  For Septic shock with MSSA bacteremia  Infective endocarditis, AYDEE showed aortic valve vegetations. Initiated with cefepime and vancomycin on 8/24 then narrowed to cefazolin 2g q8h on 8/27. Repeat blood cultures negative. Due to hypotension was started on midodrine and BP meds were held. Required pressors in ICU for septic shock in ICU. Due to poor functional status patient was not a valvular surgical candidate; not candidate for TAVR or BAV due to AV vegetation. CT abd/pelvis and brain MRI without evidence of emboli. -Reportedly refused device explant  Ancef 2 g 10/12;  then transitioned to Keflex per med/rec, chart review of ID recs for chronic suppression  Also on Rifabutin home dose  Will need follow-up with UC ID        Stage 3 wound on buttocks, heels: POA  Wound care consulted        Left leg edema: POA  - No DVT         Possible Dementia: POA  High risk for delirium w/hx of insomnia  - Appears really has not been noted with dementia type symptoms, and reportedly was very functional, works out in basement, lifting weights, up until recent acute illness past 2 moths or so. - Encourage Po intake: diet liberalized and remeron added to increase oral intake        Hypotension  - Possibly multifactorial including valvular heart disease.   - Was a major issue at recent hospital stays, had endocarditis, required dobutamine gtt/ICU stay and was placed then on midodrine TID and PRN with HD  - AM cortisol: not low  - PRN boluses crystalloids and - IV albumin to improve BP  - RYLAN Hose  - Cont midodrine  - PRN albumin per Nephro/with dialysis  - Monitor BPs         Patient Active Problem List   Diagnosis    ESRD needing dialysis [facility])       Physical Exam: BP 93/75   Pulse (!) 111   Temp 98.6 °F (37 °C) (Oral)   Resp 19   Ht 5' 9\" (1.753 m)   Wt 202 lb 13.2 oz (92 kg)   SpO2 94%   BMI 29.95 kg/m²   No results for input(s): POCGLU in the last 72 hours. General appearance: No apparent distress, appears stated age and cooperative. Neck: Supple, with full range of motion. No jugular venous distention. Respiratory:  Normal respiratory effort.  Clear to auscultation, bilaterally without Rales/Wheezes/Rhonchi. Cardiovascular: Regular rate and rhythm with normal S1/S2 without murmurs, rubs or gallops. Abdomen: Soft, non-tender, non-distended with normal bowel sounds. Musculoskelatal: No clubbing, cyanosis or edema bilaterally. Skin: Skin color, texture, turgor normal.  No rashes or lesions. Neurologic: grossly non-focal.  Psychiatric: Alert, confused (?baseline)    LABS:  Recent Labs     10/25/22  0506   WBC 23.4*   HGB 7.5*         Recent Labs     10/25/22  0506      K 3.5      CO2 26   BUN 26*   CREATININE 2.5*   GLUCOSE 90     Discharge Medications:     Medication List        START taking these medications      Venelex Oint ointment  Apply topically 2 times daily            CHANGE how you take these medications      aspirin 81 MG chewable tablet  Take 1 tablet by mouth daily Continue to hold for now and follow-up with your cardiologist and primary care provider regarding this medication. What changed: additional instructions     midodrine 10 MG tablet  Commonly known as: PROAMATINE  Take 1 tablet by mouth 3 times daily as needed (For SBP persistently <90. Must give with meals. Do not give withnin 4 hours of bedtime.) For SBP persistently <90. Must give with meals. Do not give withnin 4 hours of bedtime. What changed:   reasons to take this  additional instructions     pantoprazole 40 MG tablet  Commonly known as: PROTONIX  Take 1 tablet by mouth in the morning and at bedtime  What changed: See the new instructions.             CONTINUE taking these medications      apixaban 2.5 MG Tabs tablet  Commonly known as: ELIQUIS     atorvastatin 80 MG tablet  Commonly known as: LIPITOR     cephALEXin 500 MG capsule  Commonly known as: KEFLEX     dorzolamide 2 % ophthalmic solution  Commonly known as: TRUSOPT     ferrous sulfate 325 (65 Fe) MG tablet  Commonly known as: IRON 325     polyethylene glycol 17 GM/SCOOP powder  Commonly known as: GLYCOLAX     rifabutin 150 MG capsule  Commonly known as: MYCOBUTIN     therapeutic multivitamin-minerals tablet     timolol 0.5 % ophthalmic solution  Commonly known as: TIMOPTIC     traZODone 50 MG tablet  Commonly known as: DESYREL            ASK your doctor about these medications      allopurinol 300 MG tablet  Commonly known as: ZYLOPRIM     collagenase 250 UNIT/GM ointment  Apply topically daily. Ask about: Should I take this medication? Where to Get Your Medications        Information about where to get these medications is not yet available    Ask your nurse or doctor about these medications  aspirin 81 MG chewable tablet  collagenase 250 UNIT/GM ointment  midodrine 10 MG tablet  pantoprazole 40 MG tablet  Venelex Oint ointment        Activity: activity as tolerated  Diet: regular diet  Wound Care: as directed    Disposition: SNF  Discharged Condition: Stable  Follow Up: Primary Care Physician in one week    Total time spent on discharge, finalizing medications, referrals and arranging outpatient follow up was more than 30 minutes    Thank you Dr. Uvaldo Pride MD for the opportunity to be involved in this patients care. If you have any questions or concerns please feel free to contact me at [phone].

## 2022-10-26 NOTE — PLAN OF CARE
Problem: Skin/Tissue Integrity  Goal: Absence of new skin breakdown  Description: 1. Monitor for areas of redness and/or skin breakdown  2. Assess vascular access sites hourly  3. Every 4-6 hours minimum:  Change oxygen saturation probe site  4. Every 4-6 hours:  If on nasal continuous positive airway pressure, respiratory therapy assess nares and determine need for appliance change or resting period. Outcome: Progressing     Problem: Pain  Goal: Verbalizes/displays adequate comfort level or baseline comfort level  Outcome: Progressing     Problem: Discharge Planning  Goal: Discharge to home or other facility with appropriate resources  Outcome: Progressing     Problem: Discharge Planning  Goal: Discharge to home or other facility with appropriate resources  Outcome: Progressing     Problem: Pain  Goal: Verbalizes/displays adequate comfort level or baseline comfort level  Outcome: Progressing     Problem: Skin/Tissue Integrity  Goal: Absence of new skin breakdown  Description: 1. Monitor for areas of redness and/or skin breakdown  2. Assess vascular access sites hourly  3. Every 4-6 hours minimum:  Change oxygen saturation probe site  4. Every 4-6 hours:  If on nasal continuous positive airway pressure, respiratory therapy assess nares and determine need for appliance change or resting period.   Outcome: Progressing

## 2022-10-26 NOTE — DISCHARGE SUMMARY
Dayshift Rn Santos Chapman attempted to call rehab in which pt is being discharged to. The number is [phone], they did not answer or call to receive report from her. This Rn attempted to call report and still no answer. Report has not been called to rehab d/t them not answering. Several attempts have been made. Pt left [facility] at approx 2240.

## 2022-10-26 NOTE — PROGRESS NOTES
Report tried to call to [facility] called x2 got an auto recording and nobody would answer the phone. All dressing changes completed at this time. Pt comfortable resting in room at this time.

## 2022-10-27 NOTE — CARE COORDINATION
Case Management Assessment  Initial Evaluation    Date/Time of Evaluation: 10/27/2022 3:12 PM  Assessment Completed by: Carmen Bennett RN    Mr. Nelsy Eaton is well known to case management from his previous admission. He was discharged to [facility] on 10/25 where he was receiving rehab and on-site hemodialysis. [place] (SNF): [facility].  Phone: [phone] Fax: [phone]      If patient is discharged prior to next notation, then this note serves as note for discharge by case management. Patient Name: Roya Rubio                   YOB: 1935  Diagnosis: No admission diagnoses are documented for this encounter. Date / Time: 10/27/2022  2:46 PM    Patient Admission Status: Emergency   Readmission Risk (Low < 19, Mod (19-27), High > 27): Readmission Risk Score: 18.7    Current PCP: Meena Barbosa MD  PCP verified by CM? Yes    Chart Reviewed: Yes      History Provided by: Other (see comment), Spouse (chart review)  Patient Orientation: Unable to Assess    Patient Cognition: Alert    Hospitalization in the last 30 days (Readmission):  Yes    If yes, Readmission Assessment in CM Navigator will be completed.     Advance Directives:      Code Status: Prior FULL CODE  Patient's Primary Decision Maker is:  Spouse Janessa Saleh      Discharge Planning:    Patient lives with:   Type of Home: [place]  Primary Care Giver:  (Mr. Nelsy Eaton was admitted to [facility] for rehab  10/25)  Patient Support Systems include: Spouse/Significant Other, Children, Family Members   Current Financial resources: Medicare  Current community resources: ECF/Home Care    ADLS  Prior functional level: Assistance with the following:, Bathing, Dressing, Toileting, Feeding, Cooking, Housework, Shopping, Mobility, Other (see comment) (admitted to a rehab facility.)  Current functional level:  (tbd)    Family can provide assistance at DC:

## 2022-10-27 NOTE — CONSULTS
Nephrology Consult Note        [facility]    [email or web address]       Phone: [phone]                                                  10/27/2022 3:21 PM     Patient: Roya Rubio [id number]  1TR/1TR-01  Date of Admit: 10/27/2022 LOS: 0 days      Plan:  Can give 1 more liter of IVF but need to monitor for volume overload/Pulmonary edema. Agree with starting Levophed  Patient already got Midodrine at NH prior to arrival here  Last dialysis was on Tuesday and checking labs to determine if need urgent dialysis. If needed will need to start CRRT  Sepsis workup and antibiotics per primary team/ER  Follow Hb given history of severe anemia. Will continue with Epogen while here  Patient will be transferred to ICU  Avoid nephrotoxins if possible. Monitor input, output and weight  Monitor labs and vitals closely  Renally dose all medications      D/W ER team  Will follow and please call us in case of any questions. Thank you for allowing us to participate in this patient's care    In case of any question please call us at our 24 hour answering service [phone] or from 7 AM to 5 PM via [employer] or cell number    Tennille Amaya MD  [facility] Nephrology  [facility]  [place], [address]  Fax: [phone]  Office: [phone]       Assessment & Plan       Acute kidney Injury on CKD  On dialysis  Last dialysis was on this Tuesday prior to discharge. Electrolytes: Follow labs. Lab Results   Component Value Date    CREATININE 2.5 (H) 10/25/2022    BUN 26 (H) 10/25/2022     10/25/2022    K 3.5 10/25/2022     10/25/2022    CO2 26 10/25/2022            # CKD- MBD:   Monitor Phos level while here. Lab Results   Component Value Date    CALCIUM 8.8 10/25/2022    PHOS 2.5 10/25/2022         Acid/Base status:  Checking labs.      Volume status/BP:  Hypotensive with SBP in 60s range    Hematology:   Anemia      Lab Results   Component Value Date    IRON 25 (L) 10/15/2022    TIBC 74 (L) 10/15/2022    FERRITIN 564.1 (H) 10/15/2022     Lab Results   Component Value Date    WBC 23.4 (H) 10/25/2022    HGB 7.5 (L) 10/25/2022    HCT 24.4 (L) 10/25/2022    MCV 90.0 10/25/2022     10/25/2022             Reason for Consult and Chief Complaint     Reason for consult: HENRY    Chief complaint:   Chief Complaint   Patient presents with    Hypotension     Pt was getting ready to start dialysis today and found to have low blood pressure. Pt lethargic but trying to talk. Pt recently here for same issue and was discharged within the past week to [facility]       History of Present Illness   Marilia Alvarenga is a 82 y.o. with PMH significant for acute kidney injury on CKD on dialysis, CHF, anemia with GI ulcer, CAD, A fib was recently discharged from hospital and I went to see patient at [facility] for his routine dialysis but prior to dialysis his SBP was low in 60- 70 range and patient was more lethargic than his baseline and EMS was called and patient was sent to ER. Here BP is 63/34. Patient was started on IVF and so far got 1 liter which was started at [address] [facility] [facility]. Patient is able to answer basic questions but not reliable. He look comfortable on 2 liter oxygen. Review of Systems   Positive in bold or unable to assess     GEN: Fever, chills, night sweats. HEENT: Changes in vision, sore throat, rhinorrhea   CVS: Chest pain, palpitations, swelling or edema in legs  Pulmonary: Cough, hemoptysis, SOB  GI: Nausea, vomiting, diarrhea, constipation, abdominal pain  : Bladder incontinence, dysuria,hematuria. MSK: Muscle or joint or bone pains  Skin: Rashes, ulcers, skin thickness  CNS: Headache, dizziness, confusion, focal weakness, seizure. Psych: Anxiety, agitation, depression. Reviewed all 12 systems, negative except as above.      Past Medical History     Past Medical History:   Diagnosis Date    Chronic kidney disease          Past Surgical History     Past Surgical History: Procedure Laterality Date    UPPER GASTROINTESTINAL ENDOSCOPY N/A 10/17/2022    EGD BIOPSY performed by Sharmin Vargas MD at [address] History     History reviewed. No pertinent family history. Social History     Social History     Tobacco Use    Smoking status: Never     Passive exposure: Never    Smokeless tobacco: Never   Substance Use Topics    Alcohol use: Not Currently          Past medical, family, and social histories were reviewed as previously documented. Updates were made as necessary. Inpatient Medications and Allergies       Scheduled Meds:   vancomycin  20 mg/kg IntraVENous Once    cefepime  2,000 mg IntraVENous Once       Allergies: No Known Allergies      Vital Signs     Vitals:    10/27/22 1457   BP: (!) 68/34   Pulse: 88   Resp: 24   SpO2: (!) 78%       No intake or output data in the 24 hours ending 10/27/22 1521      Physical Exam     General appearance: Appear sick. Head: Normocephalic, without obvious abnormality, atraumatic   Mouth: Dry mucous membrane  Neck: Supple. Lungs: Good air entry bilaterally on anterior auscultation. No respiratory distress on 2 liter oxygen  Heart: S1, S2.  Abdomen: soft, non-tender, mild distended  Extremities:  + edema which appear to be little worse as compared to few days back  Skin: No concerning rashes noted  Psych: Calm. Neuro: Awake and responsive but lethargic.        Laboratory Data     Please see above     Diagnostic Studies   Pertinent images reviewed

## 2022-10-27 NOTE — ED PROVIDER NOTES
[address]          EM RESIDENT NOTE       Date of evaluation: 10/27/2022    Chief Complaint     Hypotension (Pt was getting ready to start dialysis today and found to have low blood pressure. Pt lethargic but trying to talk. Pt recently here for same issue and was discharged within the past week to [facility])      of Present Illness     Pascual Sahu is a 82 y.o. male, ESRD Tu/Th/Sa dialysis, recent hospitalization for MSSA bacteremia, endocarditis, who presents to the ED via EMS for evaluation of AMS and hypotension. The patient was receiving routine dialysis today when he became hypotensive with decreased level of consciousness, prompting his presentation to the ED. On arrival, patient has no focal complaints. He is answering to yes/no questions. Patient's wife and son at the bedside report no new fevers, cough, or vomiting. Review of Systems   A complete review of systems was conducted on this patient and was negative except as noted in the HPI. Review of Systems   Constitutional:  Negative for fever. Respiratory:  Negative for cough and shortness of breath. Cardiovascular:  Negative for chest pain. Gastrointestinal:  Negative for abdominal pain and vomiting. Musculoskeletal:  Negative for back pain. Skin:  Positive for wound (chronic sacral). Negative for rash. Past Medical, Surgical, Family, and Social History     He has a past medical history of Chronic kidney disease. He has a past surgical history that includes Upper gastrointestinal endoscopy (N/A, 10/17/2022). His family history is not on file. He reports that he has never smoked. He has never been exposed to tobacco smoke. He has never used smokeless tobacco. He reports that he does not currently use alcohol. He reports that he does not use drugs.     Medications     Discharge Medication List as of 10/27/2022  7:21 PM        CONTINUE these medications which have NOT CHANGED    Details Balsam [place]-Castor Oil (VENELEX) OINT ointment Apply topically 2 times daily, Topical, 2 TIMES DAILY Starting Thu 10/13/2022, Until Sat 11/12/2022, For 30 days, Disp-28.35 g, R-0, NO PRINT      midodrine (PROAMATINE) 10 MG tablet Take 1 tablet by mouth 3 times daily as needed (For SBP persistently <90. Must give with meals. Do not give withnin 4 hours of bedtime.) For SBP persistently <90. Must give with meals. Do not give withnin 4 hours of bedtime. , Disp-90 tablet, R-0Adjust S ig      pantoprazole (PROTONIX) 40 MG tablet Take 1 tablet by mouth in the morning and at bedtime, Disp-30 tablet, R-1NO PRINT      aspirin 81 MG chewable tablet Take 1 tablet by mouth daily Continue to hold for now and follow-up with your cardiologist and primary care provider regarding this medication. , Disp-30 tablet, R-3Adjust Sig      apixaban (ELIQUIS) 2.5 MG TABS tablet Take 2.5 mg by mouth 2 times dailyHistorical Med      atorvastatin (LIPITOR) 80 MG tablet atorvastatin 80 mg tabletHistorical Med      cephALEXin (KEFLEX) 500 MG capsule Take 500 mg by mouth every 24 hours To start 10/12Historical Med      dorzolamide (TRUSOPT) 2 % ophthalmic solution INSTILL 1 DROP INTO LEFT EYE TWICE DAILYHistorical Med      ferrous sulfate (IRON 325) 325 (65 Fe) MG tablet Take 325 mg by mouth daily (with breakfast)Historical Med      polyethylene glycol (GLYCOLAX) 17 GM/SCOOP powder Take 17 g by mouth dailyHistorical Med      traZODone (DESYREL) 50 MG tablet Take 50 mg by mouth nightly as neededHistorical Med      rifabutin (MYCOBUTIN) 150 MG capsule Take 300 mg by mouth dailyHistorical Med      timolol (TIMOPTIC) 0.5 % ophthalmic solution INSTILL 1 DROP INTO LEFT EYE TWICE DAILYHistorical Med      allopurinol (ZYLOPRIM) 300 MG tablet Take 300 mg by mouth dailyHistorical Med      Multiple Vitamins-Minerals (THERAPEUTIC MULTIVITAMIN-MINERALS) tablet Take 1 tablet by mouth dailyHistorical Med             Allergies     He has No Known Allergies. Physical Exam     INITIAL VITALS: BP: (!) 68/34,  , Heart Rate: 88, Resp: 24, SpO2: (!) 78 %   Physical Exam  Vitals reviewed. Constitutional:       General: He is not in acute distress. Appearance: He is obese. He is ill-appearing and toxic-appearing. He is not diaphoretic. HENT:      Head: Normocephalic and atraumatic. Mouth/Throat:      Mouth: Mucous membranes are dry. Eyes:      Extraocular Movements: Extraocular movements intact. Comments: Right eye surgically absent   Cardiovascular:      Rate and Rhythm: Regular rhythm. Tachycardia present. Heart sounds: Murmur heard. Pulmonary:      Effort: Pulmonary effort is normal. No respiratory distress. Breath sounds: Normal breath sounds. No wheezing or rales. Abdominal:      General: There is distension. Palpations: Abdomen is soft. Tenderness: There is no abdominal tenderness. There is no guarding or rebound. Musculoskeletal:      Cervical back: Normal range of motion. No rigidity. Skin:     General: Skin is warm and dry. Capillary Refill: Capillary refill takes more than 3 seconds. Findings: No erythema. Neurological:      Mental Status: Mental status is at baseline. Sensory: No sensory deficit. Motor: No weakness. Psychiatric:         Mood and Affect: Mood normal.         Behavior: Behavior normal.         DiagnosticResults     EKG   Interpreted in conjunction with emergencydepartment physician Esthela Rose, *  Rhythm: sinus tachycardia  Rate: tachycardia  Axis: normal  Ectopy: premature ventricular contractions (infrequent)  Conduction: normal  ST Segments: no acute change and nonspecific changes  T Waves:no acute change and non specific changes  Q Waves: none  Clinical Impression: sinus tachycardia  Comparison:  Unchanged from ECG dated 10/11/22    RADIOLOGY:  XR CHEST PORTABLE   Final Result      1.   Bilateral perihilar predominant parenchymal opacities may reflect edema and/or infection. 2.  Cardiomegaly. 3.  Right pleural effusion.              LABS:   Results for orders placed or performed during the hospital encounter of 10/27/22   BMP w/ Reflex to MG   Result Value Ref Range    Sodium 139 136 - 145 mmol/L    Potassium reflex Magnesium 4.8 3.5 - 5.1 mmol/L    Chloride 100 99 - 110 mmol/L    CO2 22 21 - 32 mmol/L    Anion Gap 17 (H) 3 - 16    Glucose 84 70 - 99 mg/dL    BUN 26 (H) 7 - 20 mg/dL    Creatinine 2.6 (H) 0.8 - 1.3 mg/dL    Est, Glom Filt Rate 23 (A) >60    Calcium 8.3 8.3 - 10.6 mg/dL   CBC with Auto Differential   Result Value Ref Range    WBC 22.7 (H) 4.0 - 11.0 K/uL    RBC 2.79 (L) 4.20 - 5.90 M/uL    Hemoglobin 7.8 (L) 13.5 - 17.5 g/dL    Hematocrit 25.6 (L) 40.5 - 52.5 %    MCV 91.7 80.0 - 100.0 fL    MCH 27.9 26.0 - 34.0 pg    MCHC 30.4 (L) 31.0 - 36.0 g/dL    RDW 23.7 (H) 12.4 - 15.4 %    Platelets 894 435 - 611 K/uL    MPV 8.1 5.0 - 10.5 fL    Neutrophils % 76.2 %    Lymphocytes % 15.0 %    Monocytes % 8.4 %    Eosinophils % 0.1 %    Basophils % 0.3 %    Neutrophils Absolute 17.3 (H) 1.7 - 7.7 K/uL    Lymphocytes Absolute 3.4 1.0 - 5.1 K/uL    Monocytes Absolute 1.9 (H) 0.0 - 1.3 K/uL    Eosinophils Absolute 0.0 0.0 - 0.6 K/uL    Basophils Absolute 0.1 0.0 - 0.2 K/uL   Hepatic Function Panel   Result Value Ref Range    Total Protein 5.5 (L) 6.4 - 8.2 g/dL    Albumin 3.0 (L) 3.4 - 5.0 g/dL    Alkaline Phosphatase 274 (H) 40 - 129 U/L     (H) 10 - 40 U/L     (H) 15 - 37 U/L    Total Bilirubin 5.2 (H) 0.0 - 1.0 mg/dL    Bilirubin, Direct 4.0 (H) 0.0 - 0.3 mg/dL    Bilirubin, Indirect 1.2 (H) 0.0 - 1.0 mg/dL   Lactic Acid   Result Value Ref Range    Lactic Acid 6.9 (HH) 0.4 - 2.0 mmol/L   Blood gas, venous (Lab)   Result Value Ref Range    pH, Faheem 7.204 (L) 7.350 - 7.450    pCO2, Faheem 58.5 (H) 41.0 - 51.0 mmHg    pO2, Faheem <30.0 25.0 - 40.0 mmHg    HCO3, Venous 23.1 (L) 24.0 - 28.0 mmol/L    Base Excess, Faheem -4.9 (L) -2.0 - 3.0 mmol/L    O2 Sat, Faheem 41 Not established %    Carboxyhemoglobin 1.3 0.0 - 1.5 %    MetHgb, Faheem 0.7 0.0 - 1.5 %    TC02 (Calc), Faheem 25 mmol/L    Hemoglobin, Faheem, Reduced 58.30 %   Troponin   Result Value Ref Range    Troponin 0.52 (HH) <0.01 ng/mL   EKG 12 Lead   Result Value Ref Range    Ventricular Rate 113 BPM    Atrial Rate 113 BPM    QRS Duration 100 ms    Q-T Interval 334 ms    QTc Calculation (Bazett) 458 ms    R Axis 36 degrees    T Axis 221 degrees    Diagnosis       EKG performed in ER and to be interpreted by ER physician. Confirmed by MD, ER ([id number]),  Ruby Federico ([id number]) on 10/27/2022 4:41:14 PM       ED BEDSIDE ULTRASOUND:  None    RECENT VITALS:  BP: (!) 72/50,  , Heart Rate: 88,Resp: 24, SpO2: (!) 78 %     Procedures     Intubation    Date/Time: 11/3/2022 10:06 PM  Performed by: Brandon Lewis MD  Authorized by: Loreta Carty MD     Consent:     Consent obtained:  Emergent situation  Pre-procedure details:     Indication: failure to oxygenate, failure to protect airway, failure to ventilate and predicted clinical deterioration      Patient status:  Unresponsive    Look externally: large tongue      Obstruction: none      Neck mobility: normal      Pharmacologic strategy: none      Induction agents:  None    Paralytics:  None  Procedure details:     Preoxygenation:  Supraglottic device    CPR in progress: yes      Intubation method:  Oral    Intubation technique: video assisted      Laryngoscope blade:  Hypercurved (S4)    Grade view: I      Tube size (mm):  8.0    Tube type:  Cuffed    Number of attempts:  1    Tube visualized through cords: yes    Placement assessment:     ETT at teeth/gumline (cm):  25    Tube secured with:  ETT cheung    Breath sounds:  Equal    Placement verification: chest rise, direct visualization and numeric ETCO2      Chest x-ray findings: not obtained.   Post-procedure details:     Procedure completion:  Tolerated      ED Course     Nursing Notes, Past Medical Hx, Past Surgical Hx, Social Hx, Allergies, and Family Hx were reviewed. The patient was given the followingmedications:  Orders Placed This Encounter   Medications    DISCONTD: norepinephrine (LEVOPHED) 16 mg in dextrose 5 % 250 mL infusion     Order Specific Question:   Titrate Infusion? Answer:   Yes     Order Specific Question:   Initial Infusion Dose: Answer:   5 mcg/min     Order Specific Question:   Goal of Therapy is: Answer:   MAP greater than 65 mmHg     Order Specific Question:   Contact Provider if:     Answer:   Patient is receiving the maximum dose and is not achieving the goal of therapy    DISCONTD: vancomycin (VANCOCIN) 2,000 mg in dextrose 5 % 500 mL IVPB     Order Specific Question:   Antimicrobial Indications     Answer:   Endocarditis/Endovascular    DISCONTD: cefepime (MAXIPIME) 2000 mg IVPB minibag     Order Specific Question:   Antimicrobial Indications     Answer:   Bloodstream Infection    DISCONTD: morphine 4 MG/ML injection     Yobany Cisneros: jeremías override    EPINEPHrine 1 MG/10ML injection    calcium chloride 10 % injection       CONSULTS:  IP CONSULT TO [facility] [employer] / [facility] / Aram Jayde is a 82 y.o. 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 On review of recent hospitalization, patient was initially treated with Vancomycin and cefepime, which was narrowed to cefazolin. Was started on midodrine for hypotension. Required prolonged ICU stay for septic shock requiring IV pressors. Due to his poor functional status, he was not considered a valvular surgical candidate, or candidate for TAVR or BAV due to the aortic valve vegetation. At that time, there is no evidence of septic emboli on CT abdomen pelvis or brain MRI.   During this hospitalization, patient also had cortisol levels that were within normal limits, therefore low suspicion for adrenal insufficiency as etiology of patient's hypotension. Patient was ultimately discharged to skilled nursing facility and transition to Keflex and rifabutin. Is of note, at time of discharge, patient had a blood pressure of 93/75, with a pulse of 111. On arrival to the emergency room, the patient is somnolent, hypotensive with maps in the 50s, tachycardic between 110-130. ED Course as of 10/31/22 [address] [place] [address] Oct 27, 2022   1529 Given hypotension, tachycardia, altered mental status and recent hospitalization with known MSSA bacteremia and endocarditis, concern for sepsis. Patient received total 1 L IV fluids prior to arrival.  Given underlying ESRD, patient cannot obtain full 30 cc/kg of IV fluid bolus for sepsis. Patient's tunneled dialysis catheter was accessed prior to arrival in which she was receiving IV fluids. Permission was given by nephrology to administer Levophed through the catheter. Will obtain blood cultures and start vancomycin and cefepime. [CG]      ED Course User Index  [CG] Candee Prader, MD     I was called to the bedside to evaluate patient as nursing staff was unable to obtain palpable pulses. CPR was performed according to ACLS protocol. An airway was initially secured with an iGel which was switched out for an ET tube. After multiple rounds of CPR, with 2 rounds of Epi and 1 amp of Calcium Chroride, patient remained in PEA. At this time, pre-arrest laboratory results became available, revealing a lactate of  6.9, however pH was 7.2, therefore no indication in Sodium Bicarb. CBC showed stable anemia with an unchanged H&H from previous hospitalization. WBC elevated at 22.7, which is also unchanged from previous. Electrolytes within normal limits. After further discussion with the patient's wife, the decision was made to terminate efforts.  The patient was extubated and was found to have no spontaneous respiratory effort as well as no palpable pulses, and was ultimately pronounced dead. This patient was also evaluated by the attending physician. All care plans were discussed and agreed upon. Clinical Impression     1. Cardiac arrest ([facility] [address]    2. Hypotension, unspecified hypotension type        Disposition     PATIENT REFERRED TO:  No follow-up provider specified.     DISCHARGE MEDICATIONS:  Discharge Medication List as of 10/27/2022  7:21 PM          DISPOSITION  10/27/2022 07:20:31 PM       Anitha Steele MD  Resident  10/31/22 [address] MD  Resident  [id number]

## 2022-10-27 NOTE — ED PROVIDER NOTES
ED Attending Attestation Note     Date of evaluation: 10/27/2022    This patient was seen by the resident. I have seen and examined the patient, agree with the workup, evaluation, management and diagnosis. The care plan has been discussed. I have reviewed the ECG and concur with the resident's interpretation. My assessment reveals a chronically ill 82-year-old male presenting with tachycardia and hypotension from his ECF. He received 2 doses of Midrin earlier today but does not improve blood pressure. He is more lethargic than when he was discharged as well. On my assessment he has a soft abdomen, follows commands in all extremities, but is somewhat slow to arouse. 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 He has a dialysis line present in the right chest without signs of induration or infection    Resuscitation was begun with 1 L of IV fluids will transition to early pressors given his known heart failure and valvular dysfunction. Possible etiologies hypotension include anemia, recurrent sepsis from his known valvular endocarditis, or cardiogenic shock if he has had progression of his valvular disease. Will restart broad-spectrum antibiotics given that he lives in extended-care facility and has chronic decubitus ulcers and other possible sources of resistant infection. He is protecting his airway now on supplemental oxygen so there is no indication for immediate intubation      Addendum: I was called to the room as the patient went into cardiopulmonary arrest.  Code dose epinephrine and calcium was given via his peripheral IV, and an i gel was placed for ventilation while CPR was started. I personally supervised the intubation performed by the resident physician. After several rounds of CPR with PEA on the monitor and epinephrine every 4 minutes, the resident approached the patient's wife to provide an update.   He was still having some intermittent spontaneous respirations at this time but no return of pulse or spontaneous circulation. She asked that we cease CPR and extubate the patient. I was also present for the terminal extubation. Following the extubation, the patient ceased all respiratory function within a couple of minutes and was pronounced dead. Critical Care:  Due to the immediate potential for life-threatening deterioration due to hypotension tachycardia and shock, I spent 95 minutes providing critical care. This time excludes time spent performing procedures but includes time spent on direct patient care, history retrieval, review of the chart, and discussions with patient, family, and consultant(s).           Cherylynn Favre, MD  10/27/22 [address], [place]  10/27/22 [id number]

## 2022-10-27 NOTE — ED TRIAGE NOTES
Pt was getting ready to start dialysis today and found to have low blood pressure. Pt lethargic but trying to talk.  Pt recently here for same issue and was discharged within the past week to [facility]

## 2022-10-31 ASSESSMENT — ENCOUNTER SYMPTOMS
BACK PAIN: 0
COUGH: 0
VOMITING: 0
ABDOMINAL PAIN: 0
SHORTNESS OF BREATH: 0

## (undated) DEVICE — FORCEPS BX L240CM JAW DIA2.4MM ORNG L CAP W/ NDL DISP RAD